# Patient Record
Sex: FEMALE | Race: WHITE | NOT HISPANIC OR LATINO | Employment: OTHER | RURAL
[De-identification: names, ages, dates, MRNs, and addresses within clinical notes are randomized per-mention and may not be internally consistent; named-entity substitution may affect disease eponyms.]

---

## 2022-04-01 ENCOUNTER — TRANSCRIBE ORDERS (OUTPATIENT)
Dept: PHYSICAL THERAPY | Facility: CLINIC | Age: 57
End: 2022-04-01

## 2022-04-01 DIAGNOSIS — R60.0 LEG EDEMA, RIGHT: Primary | ICD-10-CM

## 2022-04-22 ENCOUNTER — TREATMENT (OUTPATIENT)
Dept: PHYSICAL THERAPY | Facility: CLINIC | Age: 57
End: 2022-04-22

## 2022-04-22 DIAGNOSIS — Z89.612 HISTORY OF LEFT ABOVE KNEE AMPUTATION: ICD-10-CM

## 2022-04-22 DIAGNOSIS — L03.115 CELLULITIS OF RIGHT LEG: ICD-10-CM

## 2022-04-22 DIAGNOSIS — R26.89 NEED FOR ASSISTANCE DUE TO UNSTEADY GAIT: ICD-10-CM

## 2022-04-22 DIAGNOSIS — M75.21 BICEPS TENDONITIS ON RIGHT: ICD-10-CM

## 2022-04-22 DIAGNOSIS — R53.1 GENERALIZED WEAKNESS: Primary | ICD-10-CM

## 2022-04-22 PROCEDURE — 97163 PT EVAL HIGH COMPLEX 45 MIN: CPT | Performed by: PHYSICAL THERAPIST

## 2022-04-22 PROCEDURE — 97535 SELF CARE MNGMENT TRAINING: CPT | Performed by: PHYSICAL THERAPIST

## 2022-04-22 NOTE — PROGRESS NOTES
Physical Therapy Initial Evaluation and Plan of Care    Patient: Marilyn Martins   : 1965  Diagnosis/ICD-10 Code:  Generalized weakness [R53.1]  Referring practitioner: Rocco Mercado MD  Date of Initial Visit: 2022  Today's Date: 2022  Patient seen for 1 sessions           Subjective Questionnaire: LEFS: ; lymphedema life impact scale: provided to pt to fill out prior subsequent sessions.       Subjective Evaluation    History of Present Illness  Mechanism of injury: Delphine reports she was in hospital for 3 days, was DC 3 wks ago. She got severely dehydrated and has gotten weak. She used to transfer independent to/from  indep, no AD but now is needling help. Her sister Karla is present at  and assists with subjective. Karla & Delphine live together, Karla assists Delphine at home but Karla has kids to  and take to school. She helps Delphine into bed when she leaves. Karla reports Delphine has lost some confidence since being in hospital. She didn't have home health after DC bc her insurance wouldn't cover it. Her R shoulder has been sore in the front which makes transferring difficult.   Delphine has been sponge bathing for years, since before [facility]. However, she was able to stand at sink to do dishes prior to recent hospitalization. She owns LLE prosthesis but hasn't used it bc it just didn't work for her.      PSH: 2000: partial hysterectomy & bladder lift & hernia repair. L TKA x 2, most recently in ; RTKA ; LLE AKA .     Delphine is able to lean over R side of  to  items from floor, but is unable to do the same to the L, she'd like to.    She has /fu with Dr. Contreras (fam & sports med) in [facility] on May 6    Pain  Location: R bicipital groove  Quality: burning    Social Support  Patient lives at: tub/shower combo, no grab bars, no chair besides .    Patient Goals  Patient goals for therapy: decreased pain, improved balance, decreased  edema, increased strength and independence with ADLs/IADLs             Objective          Postural Observations  Seated posture: poor        Observations     Additional Knee Observation Details  L LE erythema, 4+ pitting edema, large bulge at R medial distal thigh w/tenderness - tissue feels fibrotic    RUE weakness 3-/5 & painful in bicipital groove, w/all overhead reaching & w/resistance in all planes neg RTC tests    LUE full AROM & 4+/5    Tenderness     Right Shoulder  Tenderness in the biceps tendon (proximal) and bicipital groove.     Active Range of Motion   Left Shoulder   Flexion: 120 degrees with pain    Right Shoulder   Flexion: 135 degrees     Tests     Right Shoulder   Positive painful arc and Speed's.   Negative external rotation lag sign and Henok's sign.     Functional Assessment     Comments  SBA for WC to edge of bed transfer, CGA for STS from  to FWW. Pt owns [facility]      SCT: Pt was provided with a hard copy of the HEP and instructed in use of it and all listed exercises.  Pt was instructed to cease any exercise that was painful, or that feels as though the form is incorrect.  Pt will return with any questions or difficulty at next session.  Pt acknowledged these instructions and agreed. View at [email or web address]-[email or web address] using code: [id number] (WC tricep push ups, pressure relief in WC, no money w/yellow band, elbow flexion w/resistance band)    Assessment & Plan     Assessment  Impairments: abnormal gait, abnormal muscle firing, abnormal muscle tone, abnormal or restricted ROM, activity intolerance, impaired balance, impaired physical strength, lacks appropriate home exercise program, pain with function and safety issue  Functional Limitations: lifting, uncomfortable because of pain, moving in bed, sitting, standing, reaching overhead and unable to perform repetitive tasks  Assessment details: Delphine is a 55 yo female presenting to OP PT after recent hospitalization at [facility] (3/29-4/1). Associated dx  include but not limited to RLE cellulitis, weakness, recurrent UTI, DM, community acquired pneumonia. She was IP x 3 days then DC home ~3 wks ago without HH PT. She is exhibiting generalized weakness, R biceps tendonitis, difficutly & instability with transfers, limited standing tolerance for ADLs, and reliance on CG (sister). She also exhibits RLE lymphedema w/skin fibrosis. Swelling has not receded with elevation. Without treatment, pt is at higher risk to develop wounds and infections which could lead to acute care hospitalization.She will be treated by CLT for RLE edema and PT or PTA for functional mobility & R shoulder dysfunction. Pt is LAKA and will need to rely on UEs for transfers.   The patient is an appropriate candidate for physical therapy and will benefit from skilled patient intervention in order address the above impairments/limitations.  The plan of care, goals and treatment plan were discussed with the patient and the patient is agreeable to participating in patient services.  Prognosis: fair    Goals  Plan Goals: STG to be met in 4 wks  1. Min or mod verbal cuing to perform exercises with bandages on  2. Recognize and verbalize signs of infection to prevent hospitalization  3. Pt will demonstrate safe, indep WC<->bed/stable chair to reduce reliance on sister/CG.   4. Pt will report at least 25% reduction in R ant shoulder pain with transfers to improve tolerance and safety with activity.  5. Pt will demonstrate at least 2 min standing tolerance at FWW to promote ADL completion and doing dishes.    LTG to be met in 12 wks  1. I with donning garment with sister's assistance.  2. Decrease swelling so that pt can don/doff appropriate foot wear.  3. Decrease R LE girth by 3-4 cm for improved fit of pants.  4. Pt will report at least 50% reduction in R ant shoulder pain with transfers to improve tolerance and safety with activity.  5. Pt will demonstrate at least 5 min standing tolerance at FWW to  promote ADL completion and doing dishes.  6. Pt will demonstrate stand pivot transfer to bench using FWW or LRAD to promote return to bathing in tub/shower combo safely.     Plan  Therapy options: will be seen for skilled therapy services  Planned modality interventions: cryotherapy, dry needling, TENS, thermotherapy (hydrocollator packs) and ultrasound  Other planned modality interventions: Complete decongestive therapy (CDT) to include: manual lymph drainage (MLD), mechanical compression bandaging (MCB), skin care, pt education, and exercise.  Planned therapy interventions: abdominal trunk stabilization, ADL retraining, balance/weight-bearing training, body mechanics training, compression, dressing changes, fine motor coordination training, flexibility, functional ROM exercises, home exercise program, IADL retraining, joint mobilization, manual therapy, motor coordination training, neuromuscular re-education, orthotic fitting/training, postural training, soft tissue mobilization, strengthening, stretching, therapeutic activities, transfer training and wheelchair management/propulsion training  Frequency: 2x week  Duration in weeks: 12  Treatment plan discussed with: patient, family and caregiver        Timed:         Manual Therapy:         mins  83606;     Therapeutic Exercise:         mins  57107;     Neuromuscular Cara:        mins  91900;    Therapeutic Activity:          mins  89368;     Gait Training:           mins  21560;     Ultrasound:          mins  76987;    Self-care  __10__ mins 21401    Un-Timed:  Electrical Stimulation:         mins  16755 ( );  Dry Needling          mins self-pay 71649  Traction          mins 99531  Low Eval          mins  63468  Mod Eval         mins  92850  High Eval                        60    mins  55978  Canal repositioning ___  mins  22508        Timed Treatment:   10   mins   Total Treatment:     70   mins    PT SIGNATURE: Margot Chairez, PT, DPT, cert. DN  [place]  license # [id number]  Electronically signed by Margot Chairez, PT, 04/22/22, 10:56 AM EDT    Initial Certification  Certification Period: 4/22/2022 through 7/20/2022  I certify that the therapy services are furnished while this patient is under my care.  The services outlined above are required by this patient, and will be reviewed every 90 days.     PHYSICIAN: Rocco Mercado MD      NPI: [id number]                                 DATE: ______________________________________________________________________________________________     Please sign and return via fax to [phone]. Thank you, [facility].

## 2022-04-25 NOTE — PROGRESS NOTES
Lymphedema evaluation:    Pt lives with her sister x20 yr. Hx L AKA 2018. Pt reports having swelling in R LE x5 yr. States swelling initially would just occur in the summer but has progressively worsened and is now all the time. Pt concerned about large amount of swelling in at her inner thigh just above her knee. Pt was in the hospital recently due to cellulitis, UTI, anemia. Pt reports that blood flow was checked while in the hospital and was good. No dx of CHF. Pt having difficulty finding shoes and pants to wear. Pt reports that MD has tried 2-3 layer compression and it did not decrease swelling.    Pt seated in manual w/c. Seated slung and pressure applied at medial mid R thigh.  Skin assessment: hyperpigmentation noted. Fibrotic skin changes present R foot and ankle. Thickened and yellow nails R foot.     Forefoot  R = 24.3cm  Malleolus  R = 26.5cm  Heel +20 cm   R = 39.7cm  Heel +30 cm   R = 52.9cm  Knee jt line  R = 59.8cm  Heel +40 cm   R = 67.5cm    Discussed with pt and her sister that cost of bandaging supplies is not covered by insurance. Pt and sister verbalized understanding. Talked with pt regarding w/c also and that they way her seat has stretched it is putting pressure on posterior medial thigh and causing tourniquet effect at this area. Instructed pt that use of waist /abdominal binder can be used to provided compression to thigh to address edema. Pt states she will get one. Plan to begin compression bandaging at next visit.    Please see eval dated this date, 4/22/2022 for goals and interventions.    Karla Garcia, PT, CLT

## 2022-05-06 ENCOUNTER — TREATMENT (OUTPATIENT)
Dept: PHYSICAL THERAPY | Facility: CLINIC | Age: 57
End: 2022-05-06

## 2022-05-06 DIAGNOSIS — Z89.612 HISTORY OF LEFT ABOVE KNEE AMPUTATION: ICD-10-CM

## 2022-05-06 DIAGNOSIS — R53.1 GENERALIZED WEAKNESS: Primary | ICD-10-CM

## 2022-05-06 DIAGNOSIS — L03.115 CELLULITIS OF RIGHT LEG: ICD-10-CM

## 2022-05-06 DIAGNOSIS — M75.21 BICEPS TENDONITIS ON RIGHT: ICD-10-CM

## 2022-05-06 DIAGNOSIS — R26.89 NEED FOR ASSISTANCE DUE TO UNSTEADY GAIT: ICD-10-CM

## 2022-05-06 PROCEDURE — 97110 THERAPEUTIC EXERCISES: CPT | Performed by: PHYSICAL THERAPIST

## 2022-05-06 PROCEDURE — 97530 THERAPEUTIC ACTIVITIES: CPT | Performed by: PHYSICAL THERAPIST

## 2022-05-06 PROCEDURE — 97014 ELECTRIC STIMULATION THERAPY: CPT | Performed by: PHYSICAL THERAPIST

## 2022-05-06 NOTE — PROGRESS NOTES
Physical Therapy Daily Progress Note      Patient: Marilyn Martins   : 1965  Diagnosis/ICD-10 Code:  Generalized weakness [R53.1]   Problems Addressed this Visit    None     Visit Diagnoses     Generalized weakness    -  Primary    Biceps tendonitis on right        Need for assistance due to unsteady gait        History of left above knee amputation (HCC)        Cellulitis of right leg          Diagnoses       Codes Comments    Generalized weakness    -  Primary ICD-10-CM: R53.1  ICD-9-CM: 780.79     Biceps tendonitis on right     ICD-10-CM: M75.21  ICD-9-CM: 726.12     Need for assistance due to unsteady gait     ICD-10-CM: R26.89  ICD-9-CM: 781.2     History of left above knee amputation (HCC)     ICD-10-CM: Z89.612  ICD-9-CM: V49.76     Cellulitis of right leg     ICD-10-CM: L03.115  ICD-9-CM: 682.6         Referring practitioner: Misha Manriquez MD  Date of Initial Visit: Type: THERAPY  Noted: 2022  Today's Date: 2022    VISIT#: 2    Subjective   Nancy reports she has not been doing her exercises or using her leg wrap. Her sister is present for session today. Sister reports she has tried to encourage Nancy to do her exercises but she hasn't been.     Objective     See Exercise, Manual, and Modality Logs for complete treatment.   Gait belt donned for out of chair activity    Assessment/Plan  Pt was able to transfer with CGA. She cont to rely heavily on UEs in standing & for transfers. R shoulder pain persists. Pt's sister & PT encouraged pt throughout session to do HEP regularly. PT advised pt to use timer & shift position every 30 min to relieve pressure.   Progress per Plan of Care         Timed:         Manual Therapy:         mins  57150;     Therapeutic Exercise:    25     mins  10506;     Neuromuscular Cara:        mins  32090;    Therapeutic Activity:     15     mins  86649;     Gait Training:           mins  68719;     Ultrasound:          mins  68480;    Ionto                                    mins   90693  Self Care                    5        mins   64821  Canalith Repos                   mins  48959    Un-Timed:  Electrical Stimulation:    15     mins  33040 ( );  Dry Needling          mins 79085/42340  Traction          mins 44675  Low Eval          Mins  04260  Mod Eval          Mins  12071  High Eval                            Mins  11621  Re-Eval                               mins  78123    Timed Treatment:   45   mins   Total Treatment:     60   mins    Margot Chairez, PT, DPT, cert. DN  Physical Therapist  [place] Lic # [id number]

## 2022-05-11 ENCOUNTER — TREATMENT (OUTPATIENT)
Dept: PHYSICAL THERAPY | Facility: CLINIC | Age: 57
End: 2022-05-11

## 2022-05-11 DIAGNOSIS — Z89.612 HISTORY OF LEFT ABOVE KNEE AMPUTATION: ICD-10-CM

## 2022-05-11 DIAGNOSIS — M75.21 BICEPS TENDONITIS ON RIGHT: ICD-10-CM

## 2022-05-11 DIAGNOSIS — R26.89 NEED FOR ASSISTANCE DUE TO UNSTEADY GAIT: ICD-10-CM

## 2022-05-11 DIAGNOSIS — L03.115 CELLULITIS OF RIGHT LEG: ICD-10-CM

## 2022-05-11 DIAGNOSIS — R53.1 GENERALIZED WEAKNESS: Primary | ICD-10-CM

## 2022-05-11 PROCEDURE — 97110 THERAPEUTIC EXERCISES: CPT | Performed by: PHYSICAL THERAPIST

## 2022-05-11 PROCEDURE — 97530 THERAPEUTIC ACTIVITIES: CPT | Performed by: PHYSICAL THERAPIST

## 2022-05-11 NOTE — PROGRESS NOTES
Physical Therapy Daily Progress Note      Patient: Marilyn Martins   : 1965  Diagnosis/ICD-10 Code:  Generalized weakness [R53.1]   Problems Addressed this Visit    None     Visit Diagnoses     Generalized weakness    -  Primary    Biceps tendonitis on right        Need for assistance due to unsteady gait        History of left above knee amputation (HCC)        Cellulitis of right leg          Diagnoses       Codes Comments    Generalized weakness    -  Primary ICD-10-CM: R53.1  ICD-9-CM: 780.79     Biceps tendonitis on right     ICD-10-CM: M75.21  ICD-9-CM: 726.12     Need for assistance due to unsteady gait     ICD-10-CM: R26.89  ICD-9-CM: 781.2     History of left above knee amputation (HCC)     ICD-10-CM: Z89.612  ICD-9-CM: V49.76     Cellulitis of right leg     ICD-10-CM: L03.115  ICD-9-CM: 682.6         Referring practitioner: Misha Manriquez MD  Date of Initial Visit: Type: THERAPY  Noted: 2022  Today's Date: 2022    VISIT#: 3    Subjective   Delphine reports she was sore after last tx session. She also notes she is already on her supplemental O2 this morning, usually uses at night but sometimes during the day prn. She is unsure why she is SOA this AM, but reports wasp sting on R post forearm. Delphine's sister Karla reports the CG team want PT to keep an eye on the R lower leg skin integrity to make sure it doesn't become a medical issue. Delphine reports Dr. Contreras will no longer treat RLE cellulitis, will leave it to CLT. Karla reports Delphine's O2 was down to 92% this AM after being on O2 all night, she's not supposed to let it get below 96%    Objective     See Exercise, Manual, and Modality Logs for complete treatment.     PT visualized erythema & edema in R post forearm, pt reports pain in area. She applied zinc oxide.   SpO2 99% throughout session on 2.5L/min supplemental O2 via NC after UE exercises    Assessment/Plan  Delphine sees Karla Garcia, PT, CLT on Friday to address  RLE lymphedema and associated issues. Delphine required fewer verbal cues for form during CAITLYN today. WC<>bed transfer SBA  Progress strengthening /stabilization /functional activity  RLE lymphedema & cellulitis addressed by CLT        Timed:         Manual Therapy:         mins  77948;     Therapeutic Exercise:    25     mins  13369;     Neuromuscular Cara:        mins  26056;    Therapeutic Activity:     15     mins  70296;     Gait Training:           mins  23232;     Ultrasound:          mins  75220;    Ionto                                   mins   68135  Self Care                            mins   82762  Canalith Repos                   mins  10701    Un-Timed:  Electrical Stimulation:         mins  00095 ( );  Dry Needling          mins 74645/04036  Traction          mins 05891  Low Eval          Mins  65090  Mod Eval          Mins  55752  High Eval                            Mins  26811  Re-Eval                               mins  46818    Timed Treatment:   40   mins   Total Treatment:     40   mins    Margot Chairez, PT, DPT, cert. DN  Physical Therapist  [place] Lic # [id number]

## 2022-05-27 ENCOUNTER — TREATMENT (OUTPATIENT)
Dept: PHYSICAL THERAPY | Facility: CLINIC | Age: 57
End: 2022-05-27

## 2022-05-27 DIAGNOSIS — R53.1 GENERALIZED WEAKNESS: ICD-10-CM

## 2022-05-27 DIAGNOSIS — L03.115 CELLULITIS OF RIGHT LEG: ICD-10-CM

## 2022-05-27 DIAGNOSIS — I89.0 LYMPHEDEMA OF RIGHT LOWER EXTREMITY: Primary | ICD-10-CM

## 2022-05-27 PROCEDURE — 97110 THERAPEUTIC EXERCISES: CPT | Performed by: PHYSICAL THERAPIST

## 2022-05-27 PROCEDURE — 97140 MANUAL THERAPY 1/> REGIONS: CPT | Performed by: PHYSICAL THERAPIST

## 2022-05-27 NOTE — PROGRESS NOTES
Physical Therapy Daily Progress Note      Patient: Marilyn Martins   : 1965  Diagnosis/ICD-10 Code:  Lymphedema of right lower extremity [I89.0]   Problems Addressed this Visit    None     Visit Diagnoses     Lymphedema of right lower extremity    -  Primary    Generalized weakness        Cellulitis of right leg          Diagnoses       Codes Comments    Lymphedema of right lower extremity    -  Primary ICD-10-CM: I89.0  ICD-9-CM: 457.1     Generalized weakness     ICD-10-CM: R53.1  ICD-9-CM: 780.79     Cellulitis of right leg     ICD-10-CM: L03.115  ICD-9-CM: 682.6         Referring practitioner: No ref. provider found  Date of Initial Visit: Type: THERAPY  Noted: 2022  Today's Date: 2022    VISIT#: 4    Subjective : Pt states her leg is still swelling. Pt's sister reports that pt has not been doing her exercises as much as she should. Pt reports she has been trying to elevate her R leg more. Pt in agreement with compression bandaging this date and pt and sister both verbalized understanding of out-of-pocket cost of bandaging supplies.    Objective : Skin on foot improved with no fibrotic tissue at base of toes.   Compression bandaging applied to R LE to thigh. Instructed pt and sister that all bandaging should be removed if pt has any pain or discomfort or discoloration of toes. Instructed to keep bandages clean and dry but short stretch bandages can be washed if needed and hang to dry. Also instructed to remove bandaging if pt is having increased difficulty with transfers due to bandages. Both verbalized good understanding of all education done this date.    Forefoot  R = 27.0cm, L = -cm  Malleolus  R = 27.2cm, L = -cm  Heel +20 cm   R = 39.3cm, L = -cm  Heel +30 cm   R = 51.4cm, L = -cm  Knee jt line  R = 58.2cm, L = -cm  Heel +40 cm   R = 67.7cm, L = -cm    See Exercise, Manual, and Modality Logs for complete treatment.     Assessment/Plan : Increased girth of foot and ankle since initial  assessment. Skin condition improving. Good tolerance in compression bandaging this date. Plan to instruct pt's sister in bandaging next visit.    Progress per Plan of Care         Timed:         Manual Therapy:    35     mins  74911;     Therapeutic Exercise:    10     mins  24041;     Neuromuscular Cara:        mins  40096;    Therapeutic Activity:          mins  63564;     Gait Training:           mins  66485;     Ultrasound:          mins  94606;    Ionto                                   mins   20431  Self Care                            mins   56317    Un-Timed:  Electrical Stimulation:         mins  13362 ( );  Dry Needling          mins 75012/21215  Traction          mins 15215  Canalith Repos                   mins  14306  Low Eval          Mins  54414  Mod Eval          Mins  29203  High Eval                            Mins  55174  Re-Eval                               mins  26769    Timed Treatment:   45   mins   Total Treatment:     45   mins    Karla Garcia, PT, CLT, Cert DN  Physical Therapist  [place] Lic # [id number]

## 2022-06-22 ENCOUNTER — TREATMENT (OUTPATIENT)
Dept: PHYSICAL THERAPY | Facility: CLINIC | Age: 57
End: 2022-06-22

## 2022-06-22 DIAGNOSIS — M75.21 BICEPS TENDONITIS ON RIGHT: ICD-10-CM

## 2022-06-22 DIAGNOSIS — Z89.612 HISTORY OF LEFT ABOVE KNEE AMPUTATION: ICD-10-CM

## 2022-06-22 DIAGNOSIS — I89.0 LYMPHEDEMA OF RIGHT LOWER EXTREMITY: Primary | ICD-10-CM

## 2022-06-22 DIAGNOSIS — R26.89 NEED FOR ASSISTANCE DUE TO UNSTEADY GAIT: ICD-10-CM

## 2022-06-22 DIAGNOSIS — R53.1 GENERALIZED WEAKNESS: ICD-10-CM

## 2022-06-22 PROCEDURE — 97530 THERAPEUTIC ACTIVITIES: CPT | Performed by: PHYSICAL THERAPIST

## 2022-06-22 PROCEDURE — 97110 THERAPEUTIC EXERCISES: CPT | Performed by: PHYSICAL THERAPIST

## 2022-06-22 NOTE — PROGRESS NOTES
Physical Therapy Daily Progress Note      Patient: Marilyn Martins   : 1965  Diagnosis/ICD-10 Code:  Lymphedema of right lower extremity [I89.0]   Problems Addressed this Visit    None     Visit Diagnoses     Lymphedema of right lower extremity    -  Primary    Generalized weakness        Biceps tendonitis on right        Need for assistance due to unsteady gait        History of left above knee amputation (HCC)          Diagnoses       Codes Comments    Lymphedema of right lower extremity    -  Primary ICD-10-CM: I89.0  ICD-9-CM: 457.1     Generalized weakness     ICD-10-CM: R53.1  ICD-9-CM: 780.79     Biceps tendonitis on right     ICD-10-CM: M75.21  ICD-9-CM: 726.12     Need for assistance due to unsteady gait     ICD-10-CM: R26.89  ICD-9-CM: 781.2     History of left above knee amputation (HCC)     ICD-10-CM: Z89.612  ICD-9-CM: V49.76         Referring practitioner: Rocco Mercado, *  Date of Initial Visit: Type: THERAPY  Noted: 2022  Today's Date: 2022    VISIT#: 5    Subjective   Delphine arrives to PT with her sister Karla. Delphine reports compliance with HEP but Kalra reports pt has not been doing hep. Delphine had a stent place in OP procedure 2 wks ago. She was also dx with kidney stone on L side. Both shoulders are bothersome now. They report Delphine got a new chair (wing back) 3 days ago and has been using that to get out of her WC during the day.     Objective     See Exercise, Manual, and Modality Logs for complete treatment.     Assessment/Plan  Delphine was strongly encouraged to comply with HEP for optimized outcomes. STS transfers were CGA. She cont to exhibit UE & trunk weakness resulting in aberrant shoulder mechanics and pain.   Progress per Plan of Care         Timed:         Manual Therapy:         mins  11511;     Therapeutic Exercise:    32     mins  79684;     Neuromuscular Cara:        mins  13412;    Therapeutic Activity:     8     mins  91619;     Gait Training:            mins  23397;     Ultrasound:          mins  58966;    Ionto                                   mins   22321  Self Care                            mins   94970  Canalith Repos                   mins  48150    Un-Timed:  Electrical Stimulation:         mins  45292 ( );  Dry Needling          mins 64184/12731  Traction          mins 96981  Low Eval          Mins  23000  Mod Eval          Mins  80866  High Eval                            Mins  93096  Re-Eval                               mins  23071    Timed Treatment:   38   mins   Total Treatment:     38   mins    Margot Chairez, PT, DPT, cert. DN  Physical Therapist  [place] Lic # [id number]

## 2022-06-29 ENCOUNTER — TREATMENT (OUTPATIENT)
Dept: PHYSICAL THERAPY | Facility: CLINIC | Age: 57
End: 2022-06-29

## 2022-06-29 DIAGNOSIS — R26.89 NEED FOR ASSISTANCE DUE TO UNSTEADY GAIT: ICD-10-CM

## 2022-06-29 DIAGNOSIS — M75.21 BICEPS TENDONITIS ON RIGHT: ICD-10-CM

## 2022-06-29 DIAGNOSIS — Z89.612 HISTORY OF LEFT ABOVE KNEE AMPUTATION: ICD-10-CM

## 2022-06-29 DIAGNOSIS — R53.1 GENERALIZED WEAKNESS: Primary | ICD-10-CM

## 2022-06-29 PROCEDURE — 97110 THERAPEUTIC EXERCISES: CPT | Performed by: PHYSICAL THERAPIST

## 2022-06-29 PROCEDURE — 97530 THERAPEUTIC ACTIVITIES: CPT | Performed by: PHYSICAL THERAPIST

## 2022-06-29 NOTE — PROGRESS NOTES
Physical Therapy Daily Progress Note      Patient: Marilyn Martins   : 1965  Diagnosis/ICD-10 Code:  Generalized weakness [R53.1]   Problems Addressed this Visit    None     Visit Diagnoses     Generalized weakness    -  Primary    Biceps tendonitis on right        Need for assistance due to unsteady gait        History of left above knee amputation (HCC)          Diagnoses       Codes Comments    Generalized weakness    -  Primary ICD-10-CM: R53.1  ICD-9-CM: 780.79     Biceps tendonitis on right     ICD-10-CM: M75.21  ICD-9-CM: 726.12     Need for assistance due to unsteady gait     ICD-10-CM: R26.89  ICD-9-CM: 781.2     History of left above knee amputation (HCC)     ICD-10-CM: Z89.612  ICD-9-CM: V49.76          Referring practitioner: Rocco Mercado, *  Date of Initial Visit: Type: THERAPY  Noted: 2022  Today's Date: 2022    VISIT#: 6    Subjective Patient reports feeling ok today, admits to not performing HEP on regular basis. Doesn't feel motivated to perform HEP.       Objective     See Exercise, Manual, and Modality Logs for complete treatment.     Assessment/Plan Patient provided proper return demonstration with WC to bed transfers x 3 with no assistance required. Patient tolerated all performed therapeutic exercise well with no reports of increased symptoms. Patient will continue to benefit from continued R LE extremity for preparation to use prosthetic on L LE.   Goals  Plan Goals: STG to be met in 4 wks  1. Min or mod verbal cuing to perform exercises with bandages on MET  2. Recognize and verbalize signs of infection to prevent hospitalization MET  3. Pt will demonstrate safe, indep WC<->bed/stable chair to reduce reliance on sister/CG.  MET  4. Pt will report at least 25% reduction in R ant shoulder pain with transfers to improve tolerance and safety with activity. Progressing  5. Pt will demonstrate at least 2 min standing tolerance at FWW to promote ADL completion and doing  dishes. Progressing currently at 1 min    LTG to be met in 12 wks  1. I with donning garment with sister's assistance. Progressing  2. Decrease swelling so that pt can don/doff appropriate foot wear. Progressing  3. Decrease R LE girth by 3-4 cm for improved fit of pants. Not MET  4. Pt will report at least 50% reduction in R ant shoulder pain with transfers to improve tolerance and safety with activity. NOT MET  5. Pt will demonstrate at least 5 min standing tolerance at FWW to promote ADL completion and doing dishes. NOT MET  6. Pt will demonstrate stand pivot transfer to bench using FWW or LRAD to promote return to bathing in tub/shower combo safely. progressing    Progress per Plan of Care and Progress strengthening /stabilization /functional activity         Timed:         Manual Therapy:         mins  44304;     Therapeutic Exercise:    30     mins  39593;     Neuromuscular Cara:        mins  66467;    Therapeutic Activity:     10     mins  14147;     Gait Training:           mins  68825;     Ultrasound:          mins  46095;    Ionto                                   mins   26511  Self Care                    _____  mins   56401  Canalith Repos                  mins  87965    Un-Timed:  Electrical Stimulation:         mins  42002 ( );  Dry Needling          mins self-pay   Traction          mins 59666    Timed Treatment:   40   mins   Total Treatment:     40   mins    Eric Perez PTA  [place] License [id number]  Physical Therapist Assistant

## 2022-07-06 ENCOUNTER — HOSPITAL ENCOUNTER (OUTPATIENT)
Dept: GENERAL RADIOLOGY | Facility: HOSPITAL | Age: 57
Discharge: HOME | End: 2022-07-06
Admitting: UROLOGY

## 2022-07-06 ENCOUNTER — TREATMENT (OUTPATIENT)
Dept: PHYSICAL THERAPY | Facility: CLINIC | Age: 57
End: 2022-07-06

## 2022-07-06 DIAGNOSIS — I89.0 LYMPHEDEMA OF RIGHT LOWER EXTREMITY: Primary | ICD-10-CM

## 2022-07-06 DIAGNOSIS — L03.115 CELLULITIS OF RIGHT LEG: ICD-10-CM

## 2022-07-06 DIAGNOSIS — Z89.612 HISTORY OF LEFT ABOVE KNEE AMPUTATION: ICD-10-CM

## 2022-07-06 DIAGNOSIS — R53.1 GENERALIZED WEAKNESS: ICD-10-CM

## 2022-07-06 DIAGNOSIS — N20.0 CALCULUS, RENAL: ICD-10-CM

## 2022-07-06 PROCEDURE — 97140 MANUAL THERAPY 1/> REGIONS: CPT | Performed by: PHYSICAL THERAPIST

## 2022-07-06 PROCEDURE — 74018 RADEX ABDOMEN 1 VIEW: CPT

## 2022-07-06 PROCEDURE — 97110 THERAPEUTIC EXERCISES: CPT | Performed by: PHYSICAL THERAPIST

## 2022-07-06 NOTE — PROGRESS NOTES
Physical Therapy Daily Progress Note      Patient: Marilyn Martins   : 1965  Diagnosis/ICD-10 Code:  Lymphedema of right lower extremity [I89.0]   Problems Addressed this Visit    None     Visit Diagnoses     Lymphedema of right lower extremity    -  Primary    Cellulitis of right leg        History of left above knee amputation (HCC)        Generalized weakness          Diagnoses       Codes Comments    Lymphedema of right lower extremity    -  Primary ICD-10-CM: I89.0  ICD-9-CM: 457.1     Cellulitis of right leg     ICD-10-CM: L03.115  ICD-9-CM: 682.6     History of left above knee amputation (HCC)     ICD-10-CM: Z89.612  ICD-9-CM: V49.76     Generalized weakness     ICD-10-CM: R53.1  ICD-9-CM: 780.79         Referring practitioner: Rocco Mercado, *  Date of Initial Visit: Type: THERAPY  Noted: 2022  Today's Date: 2022    VISIT#: 7    Subjective Pt states she has been sitting a chair and putting R LE up on ottoman more at home instead of sitting in w/c all the time. States she has not been doing band exercises everyday but has been working on standing. States bandaging only stayed on about 1 day after last visit.    Objective : Encouraged pt's sister to call [employer] to inquire about work order that was put in to replace w/c seat. She verbalized understanding.   Small scabbed area noted along R TKR incision. No signs of infection.     Forefoot  R = 26.1cm, L = -cm  Malleolus  R = 25.5cm, L = -cm  Heel +20 cm   R = 35.5cm, L = -cm  Heel +30 cm   R = 49.4cm, L = -cm  Heel +40 cm   R = 55.8cm, L = -cm  Knee jt line  R = 65.5cm, L = -cm    Performed R LE ther ex seated with compression bandaging in place.  See Exercise, Manual, and Modality Logs for complete treatment.     Assessment/Plan : Decreased girth measurements R LE most likely due to pt elevating leg more during the day. PT to instruct pt's sister in bandaging at next visit. Good tolerance to compression bandaging this  date.    Progress per Plan of Care         Timed:         Manual Therapy:    30     mins  59823;     Therapeutic Exercise:    15     mins  66244;     Neuromuscular Cara:        mins  38887;    Therapeutic Activity:          mins  07168;     Gait Training:           mins  11866;     Ultrasound:          mins  47740;    Ionto                                   mins   98056  Self Care                            mins   17012    Un-Timed:  Electrical Stimulation:         mins  04675 ( );  Dry Needling          mins 49983/16660  Traction          mins 20484  Canalith Repos                   mins  02468  Low Eval          Mins  74021  Mod Eval          Mins  49368  High Eval                            Mins  24826  Re-Eval                               mins  10329    Timed Treatment:   45   mins   Total Treatment:     45   mins    Karla Garcia, PT, CLT, Cert DN  Physical Therapist  [place] Lic # [id number]

## 2022-07-08 ENCOUNTER — TREATMENT (OUTPATIENT)
Dept: PHYSICAL THERAPY | Facility: CLINIC | Age: 57
End: 2022-07-08

## 2022-07-08 DIAGNOSIS — Z89.612 HISTORY OF LEFT ABOVE KNEE AMPUTATION: ICD-10-CM

## 2022-07-08 DIAGNOSIS — L03.115 CELLULITIS OF RIGHT LEG: Primary | ICD-10-CM

## 2022-07-08 DIAGNOSIS — R53.1 GENERALIZED WEAKNESS: ICD-10-CM

## 2022-07-08 DIAGNOSIS — M75.21 BICEPS TENDONITIS ON RIGHT: ICD-10-CM

## 2022-07-08 DIAGNOSIS — R26.89 NEED FOR ASSISTANCE DUE TO UNSTEADY GAIT: ICD-10-CM

## 2022-07-08 PROCEDURE — 97530 THERAPEUTIC ACTIVITIES: CPT | Performed by: PHYSICAL THERAPIST

## 2022-07-08 PROCEDURE — 97110 THERAPEUTIC EXERCISES: CPT | Performed by: PHYSICAL THERAPIST

## 2022-07-08 NOTE — PROGRESS NOTES
Physical Therapy Daily Progress Note      Patient: Marilyn Martins   : 1965  Diagnosis/ICD-10 Code:  Cellulitis of right leg [L03.115]   Problems Addressed this Visit    None     Visit Diagnoses     Cellulitis of right leg    -  Primary    History of left above knee amputation (HCC)        Generalized weakness        Biceps tendonitis on right        Need for assistance due to unsteady gait          Diagnoses       Codes Comments    Cellulitis of right leg    -  Primary ICD-10-CM: L03.115  ICD-9-CM: 682.6     History of left above knee amputation (HCC)     ICD-10-CM: Z89.612  ICD-9-CM: V49.76     Generalized weakness     ICD-10-CM: R53.1  ICD-9-CM: 780.79     Biceps tendonitis on right     ICD-10-CM: M75.21  ICD-9-CM: 726.12     Need for assistance due to unsteady gait     ICD-10-CM: R26.89  ICD-9-CM: 781.2          Referring practitioner: Rocco Mercado, *  Date of Initial Visit: Type: THERAPY  Noted: 2022  Today's Date: 2022    VISIT#: 8    Subjective Patient reports feeling ok, has been standing more at home and is pleased with current progress.       Objective     See Exercise, Manual, and Modality Logs for complete treatment.     Assessment/Plan Patient tolerated progressed therapeutic exercise well with only reports of mild fatigue. Patient will continue to benefit from improved R lower extremity strengthening for improved weight bearing tolerance for improved transitional movement.   Goals  Plan Goals: STG to be met in 4 wks  1. Min or mod verbal cuing to perform exercises with bandages on MET  2. Recognize and verbalize signs of infection to prevent hospitalization MET  3. Pt will demonstrate safe, indep WC<->bed/stable chair to reduce reliance on sister/CG.  MET  4. Pt will report at least 25% reduction in R ant shoulder pain with transfers to improve tolerance and safety with activity. Progressing  5. Pt will demonstrate at least 2 min standing tolerance at FWW to promote ADL  completion and doing dishes. Progressing currently at 1 min    LTG to be met in 12 wks  1. I with donning garment with sister's assistance. Progressing  2. Decrease swelling so that pt can don/doff appropriate foot wear. Progressing  3. Decrease R LE girth by 3-4 cm for improved fit of pants. Not MET  4. Pt will report at least 50% reduction in R ant shoulder pain with transfers to improve tolerance and safety with activity. NOT MET  5. Pt will demonstrate at least 5 min standing tolerance at FWW to promote ADL completion and doing dishes. NOT MET  6. Pt will demonstrate stand pivot transfer to bench using FWW or LRAD to promote return to bathing in tub/shower combo safely. progressing    Progress per Plan of Care and Progress strengthening /stabilization /functional activity         Timed:         Manual Therapy:         mins  95620;     Therapeutic Exercise:    30     mins  47575;     Neuromuscular Cara:        mins  02368;    Therapeutic Activity:     10     mins  66824;     Gait Training:           mins  13712;     Ultrasound:          mins  54040;    Ionto                                   mins   20126  Self Care                    _____  mins   33578  Canalith Repos                   mins  12394    Un-Timed:  Electrical Stimulation:         mins  46709 ( );  Dry Needling         mins self-pay   Traction          mins 03336    Timed Treatment:   40   mins   Total Treatment:     40   mins    Eric Perez PTA  [place] License [id number]  Physical Therapist Assistant

## 2022-07-15 ENCOUNTER — TRANSCRIBE ORDERS (OUTPATIENT)
Dept: ADMINISTRATIVE | Facility: HOSPITAL | Age: 57
End: 2022-07-15

## 2022-07-15 DIAGNOSIS — N13.30 HYDRONEPHROSIS, UNSPECIFIED HYDRONEPHROSIS TYPE: ICD-10-CM

## 2022-07-15 DIAGNOSIS — N20.0 CALCULUS, RENAL: Primary | ICD-10-CM

## 2022-07-20 ENCOUNTER — TREATMENT (OUTPATIENT)
Dept: PHYSICAL THERAPY | Facility: CLINIC | Age: 57
End: 2022-07-20

## 2022-07-20 DIAGNOSIS — I89.0 LYMPHEDEMA OF RIGHT LOWER EXTREMITY: ICD-10-CM

## 2022-07-20 DIAGNOSIS — R26.89 NEED FOR ASSISTANCE DUE TO UNSTEADY GAIT: ICD-10-CM

## 2022-07-20 DIAGNOSIS — R53.1 GENERALIZED WEAKNESS: ICD-10-CM

## 2022-07-20 DIAGNOSIS — L03.115 CELLULITIS OF RIGHT LEG: Primary | ICD-10-CM

## 2022-07-20 DIAGNOSIS — M75.21 BICEPS TENDONITIS ON RIGHT: ICD-10-CM

## 2022-07-20 DIAGNOSIS — Z89.612 HISTORY OF LEFT ABOVE KNEE AMPUTATION: Primary | ICD-10-CM

## 2022-07-20 PROCEDURE — 97110 THERAPEUTIC EXERCISES: CPT | Performed by: PHYSICAL THERAPIST

## 2022-07-20 PROCEDURE — 97530 THERAPEUTIC ACTIVITIES: CPT | Performed by: PHYSICAL THERAPIST

## 2022-07-20 PROCEDURE — 97140 MANUAL THERAPY 1/> REGIONS: CPT | Performed by: PHYSICAL THERAPIST

## 2022-07-20 NOTE — PROGRESS NOTES
Physical Therapy Daily Progress Note      Patient: Marilyn Martins   : 1965  Diagnosis/ICD-10 Code:  Cellulitis of right leg [L03.115]   Problems Addressed this Visit    None     Visit Diagnoses     Cellulitis of right leg    -  Primary    Lymphedema of right lower extremity          Diagnoses       Codes Comments    Cellulitis of right leg    -  Primary ICD-10-CM: L03.115  ICD-9-CM: 682.6     Lymphedema of right lower extremity     ICD-10-CM: I89.0  ICD-9-CM: 457.1         Referring practitioner: Rocco Mercado, *  Date of Initial Visit: Type: THERAPY  Noted: 2022  Today's Date: 2022    VISIT#: 10    Subjective : Pt reports compression bandaging stayed on x1 wk from knee down but thigh portion came off after a couple days.    Objective : R LE skin intact. Pt's sister reports that she has still not heard anything from [facility] regarding w/c repair.    Forefoot  R = 25.7cm, L = -cm  Malleolus  R = 24.9cm, L = -cm  Heel +20 cm   R = 34.8cm, L = -cm  Heel +30 cm   R = 50.3cm, L = -cm  Knee jt line  R = 55.2cm, L = -cm  Heel +45 cm   R = 64.8cm, L = -cm    See Exercise, Manual, and Modality Logs for complete treatment.     Assessment/Plan : Mild decreased girth measurements R LE. Plan PT to instruct pt's sister in bandaging at next visit. Good tolerance to compression bandaging this date.    Progress per Plan of Care         Timed:         Manual Therapy:    40     mins  60412;     Therapeutic Exercise:         mins  13401;     Neuromuscular Cara:        mins  35657;    Therapeutic Activity:          mins  96187;     Gait Training:           mins  45576;     Ultrasound:          mins  77809;    Ionto                                   mins   77839  Self Care                            mins   55307    Un-Timed:  Electrical Stimulation:         mins  83539 ( );  Dry Needling          mins /  Traction          mins 21390  Canalith Repos                   mins  74627  Low Eval           Mins  43468  Mod Eval          Mins  18258  High Eval                            Mins  26081  Re-Eval                               mins  18570    Timed Treatment:   40   mins   Total Treatment:     40   mins    Karla Garcia PT, CLT, Cert DN  Physical Therapist  [place] Lic # [id number]

## 2022-07-20 NOTE — PROGRESS NOTES
Physical Therapy Daily Progress Note      Patient: Marilyn Martins   : 1965  Diagnosis/ICD-10 Code:  History of left above knee amputation (HCC) [Z89.612]   Problems Addressed this Visit    None     Visit Diagnoses     History of left above knee amputation (HCC)    -  Primary    Generalized weakness        Biceps tendonitis on right        Need for assistance due to unsteady gait          Diagnoses       Codes Comments    History of left above knee amputation (HCC)    -  Primary ICD-10-CM: Z89.612  ICD-9-CM: V49.76     Generalized weakness     ICD-10-CM: R53.1  ICD-9-CM: 780.79     Biceps tendonitis on right     ICD-10-CM: M75.21  ICD-9-CM: 726.12     Need for assistance due to unsteady gait     ICD-10-CM: R26.89  ICD-9-CM: 781.2         Referring practitioner: Rocco Mercado, *  Date of Initial Visit: Type: THERAPY  Noted: 2022  Today's Date: 2022    VISIT#: 9    Subjective   Delphine admits she has not done her exercises. However, Karla reports Delphine is able to stand longer now, e.g. in bathroom, and she is able to use handy bar and get into/out of vehicle with relative ease.     Objective     See Exercise, Manual, and Modality Logs for complete treatment.     Assessment/Plan  Delphine was again encouraged to work on HEP to reduce shoulder pain and increase ease of transfers and reduce LE edema.    RECERT          Timed:         Manual Therapy:         mins  85332;     Therapeutic Exercise:    30     mins  64715;     Neuromuscular Cara:        mins  13336;    Therapeutic Activity:     15     mins  16530;     Gait Training:           mins  64681;     Ultrasound:          mins  55357;    Ionto                                   mins   48232  Self Care                            mins   25564  Canalith Repos                   mins  34997    Un-Timed:  Electrical Stimulation:         mins  71083 ( );  Dry Needling          mins /  Traction          mins 80718  Low Eval           Mins  84708  Mod Eval          Mins  04005  High Eval                            Mins  81120  Re-Eval                               mins  23555    Timed Treatment:   45   mins   Total Treatment:     45   mins    Margot Chairez PT, DPT, cert. DN  Physical Therapist  [place] Lic # [id number]

## 2022-07-22 ENCOUNTER — TREATMENT (OUTPATIENT)
Dept: PHYSICAL THERAPY | Facility: CLINIC | Age: 57
End: 2022-07-22

## 2022-07-22 DIAGNOSIS — M75.21 BICEPS TENDONITIS ON RIGHT: ICD-10-CM

## 2022-07-22 DIAGNOSIS — L03.115 CELLULITIS OF RIGHT LEG: ICD-10-CM

## 2022-07-22 DIAGNOSIS — R53.1 GENERALIZED WEAKNESS: ICD-10-CM

## 2022-07-22 DIAGNOSIS — R26.89 NEED FOR ASSISTANCE DUE TO UNSTEADY GAIT: ICD-10-CM

## 2022-07-22 DIAGNOSIS — Z74.09: ICD-10-CM

## 2022-07-22 DIAGNOSIS — Z99.3 WHEELCHAIR DEPENDENT: ICD-10-CM

## 2022-07-22 DIAGNOSIS — I89.0 LYMPHEDEMA OF RIGHT LOWER EXTREMITY: Primary | ICD-10-CM

## 2022-07-22 DIAGNOSIS — Z89.612 HISTORY OF LEFT ABOVE KNEE AMPUTATION: Primary | ICD-10-CM

## 2022-07-22 PROCEDURE — 97140 MANUAL THERAPY 1/> REGIONS: CPT | Performed by: PHYSICAL THERAPIST

## 2022-07-22 PROCEDURE — 97530 THERAPEUTIC ACTIVITIES: CPT | Performed by: PHYSICAL THERAPIST

## 2022-07-22 PROCEDURE — 97164 PT RE-EVAL EST PLAN CARE: CPT | Performed by: PHYSICAL THERAPIST

## 2022-07-22 PROCEDURE — 97535 SELF CARE MNGMENT TRAINING: CPT | Performed by: PHYSICAL THERAPIST

## 2022-07-22 NOTE — PROGRESS NOTES
Re-Assessment / Re-Certification        Patient: Marilyn Martins   : 1965  Diagnosis/ICD-10 Code:  Lymphedema of right lower extremity [I89.0]  Referring practitioner: Rocco Mercado, *  Date of Initial Visit: Type: THERAPY  Noted: 2022  Today's Date: 2022  Patient seen for 1 sessions      Subjective:   Marilyn Martins reports: bandaging has felt good from last visit but thigh part has slid down. Having more trouble transferring with bandaging in place.  Subjective Questionnaire: PT Functional Test: LLIS = 50% impairment  Clinical Progress: improved  Home Program Compliance: Yes  Treatment has included: therapeutic exercise, manual therapy and therapeutic activity      Objective : skin intact and clean. Large lobule medial aspect of distal thigh.    Forefoot  R = 23.8cm, L = -cm  Malleolus  R = 23.5cm, L = -cm  Heel +20 cm   R = 33.0cm, L = -cm  Heel +30 cm   R = 48.1cm, L = -cm  Knee jt line  R = 54.5cm, L = -cm  Heel +45 cm   R = 63.4cm, L = -cm    Assessment/Plan : Decreased girth R LE. Good tolerance to compression bandaging but having increased difficulty with transfers with bandaging in place. Treatment to date has been inconsistent due to scheduling limitations. Pt will benefit from continued PT services to further decrease R LE girth and get pt into compression garment for long-term management of lymphedema.    Progress toward previous goals: Partially Met   STG  1. Min or mod verbal cuing to perform exercises with bandages on - Not met  2. Recognize and verbalize signs of infection to prevent hospitalization - MET  LTG  1. I with donning garment with sister's assistance. - Not met  2. Decrease swelling so that pt can don/doff appropriate foot wear. - Not met  3. Decrease R LE girth by 3-4 cm for improved fit of pants. - Not met    New Goals  Short-term goals (STG): will continue to work toward established goals.  Long-term goals (LTG): Will continue to work toward established  goals.      Recommendations: Continue as planned  Timeframe: 3 months  Frequency: 2x/wk  Prognosis to achieve goals: good    PT Signature: Karla Garcia PT, CLT  Physical Therapist  [place] Lic # [id number]  Electronically signed by Karla Garcia PT, 07/22/22, 3:03 PM EDT    Certification Period: 7/22/2022 thru 10/22/2022  I certify that the therapy services are furnished while this patient is under my care. The services outlined above are required by this patient and will be reviewed every 90 days.    PHYSICIAN: Rocco Mercado MD _____________________________________________________________  NPI: [id number]                                      DATE:    ___________________________________________      Timed:         Manual Therapy:    40     mins  99339;     Therapeutic Exercise:         mins  42777;     Neuromuscular Cara:        mins  58170;    Therapeutic Activity:          mins  92590;     Gait Training:           mins  11533;     Ultrasound:          mins  07408;    Ionto                                   mins   19474  Self Care                            mins   07274    Un-Timed:  Electrical Stimulation:         mins  75627 ( );  Dry Needling          mins 82795/95820  Traction          mins 46057  Can Repos          mins 96424  Low Eval          Mins  31775  Mod Eval          Mins  60209  High Eval                            Mins  49149  Re-Eval                               mins  95626      Timed Treatment:   40   mins   Total Treatment:     40   mins

## 2022-07-22 NOTE — PROGRESS NOTES
Re-Assessment / Re-Certification        Patient: Marilyn Martins   : 1965  Diagnosis/ICD-10 Code:  History of left above knee amputation (HCC) [Z89.612]  Referring practitioner: Rocco Mercado, *  Date of Initial Visit: Type: THERAPY  Noted: 2022  Today's Date: 2022  Patient seen for 11 sessions      Subjective:   Marilyn Martins reports: Delphine & her sister, Karla report Delphine has been active this morning, transferring between vehicles several times. They showed PT video of pt transferring to wing back chair at home and would like to discuss strategies. Her R hand has been going numb after prolong WC propulsion.     Subjective Questionnaire: PT Functional Test: LEFS = 10/80 ( at IE)  Clinical Progress: improved  Home Program Compliance: No  Treatment has included: therapeutic exercise, manual therapy, therapeutic activity and electrical stimulation    Subjective   Objective   Assessment/Plan  Progress toward previous goals: Partially Met     Plan Goals: STG to be met in 4 wks  1. Min or mod verbal cuing to perform exercises with bandages on DC, CLT will start new episode  2. Recognize and verbalize signs of infection to prevent hospitalization  DC, CLT will start new episode  3. Pt will demonstrate safe, indep WC<->bed/stable chair to reduce reliance on sister/CG.  - MET  4. Pt will report at least 25% reduction in R ant shoulder pain with transfers to improve tolerance and safety with activity. - MET, still some pain in B shldrs when lying on side at night  5. Pt will demonstrate at least 2 min standing tolerance at FWW to promote ADL completion and doing dishes. - MET    LTG to be met in 12 wks  1. I with donning garment with sister's assistance.  DC, CLT will start new episode  2. Decrease swelling so that pt can don/doff appropriate foot wear.  DC, CLT will start new episode  3. Decrease R LE girth by 3-4 cm for improved fit of pants.  DC, CLT will start new episode  4. Pt  will report at least 50% reduction in R ant shoulder pain with transfers to improve tolerance and safety with activity. - MET  5. Pt will demonstrate at least 5 min standing tolerance at FWW to promote ADL completion and doing dishes. - NOT MET  6. Pt will demonstrate stand pivot transfer to bench using FWW or LRAD to promote return to bathing in tub/shower combo safely. - WC won't fit into the bathroom    New Goals  Short-term goals (STG):  6. Pt will demonstrate ability to transfer from WC <> low surface with SBA for getting in/out of [employer] vs van.   Long-term goals (LTG): 7. Pt will demonstrate ability to stand at least 2 min w/unilat UE support to facilitate using C/L hand for brushing teeth or wiping countertops.   8. Pt will bring prosthetic limb for possible trial (3-4 steps to bathtub)  9. Pt will hop w/standard or FWW ~3-4 hops to get into bathroom for shower bench transfer       Delphine has made significant progress since IE, considering she admits to not doing her HEP. She cont to have difficulty with transfers to surfaces of different height than her WC seat. She is not using FWW at home for transfers but will benefit from walker training to get into bathroom for bathing.      Recommendations: Continue as planned  Timeframe: 3 months  Frequency: 2x/wk  Prognosis to achieve goals: good    PT Signature: Margot Chairez PT, DPT, cert DN  Physical Therapist  [place] Lic # [id number]  Electronically signed by Margot Chairez PT, 07/22/22, 10:10 AM EDT    Certification Period: 7/29/22 thru 10/29/22  I certify that the therapy services are furnished while this patient is under my care. The services outlined above are required by this patient and will be reviewed every 90 days.    PHYSICIAN: Rocco Mercado MD _____________________________________________________________  NPI: [id number]                                      DATE:    ___________________________________________      Timed:          Manual Therapy:         mins  27265;     Therapeutic Exercise:         mins  61375;     Neuromuscular Cara:       mins  87240;    Therapeutic Activity:     10     mins  16311;     Gait Training:           mins  21513;     Ultrasound:          mins  06695;    Ionto                                   mins   08139  Self Care                       15     mins   04507    Un-Timed:  Electrical Stimulation:         mins  07636 ( );  Dry Needling          mins 75760/45206  Traction          mins 19655  Can Repos          mins 91233  Low Eval          Mins  07674  Mod Eval          Mins  58793  High Eval                            Mins  47460  Re-Eval                           30    mins  79365      Timed Treatment:   25   mins   Total Treatment:     55   mins

## 2022-08-03 ENCOUNTER — TREATMENT (OUTPATIENT)
Dept: PHYSICAL THERAPY | Facility: CLINIC | Age: 57
End: 2022-08-03

## 2022-08-03 DIAGNOSIS — Z89.612 HISTORY OF LEFT ABOVE KNEE AMPUTATION: ICD-10-CM

## 2022-08-03 DIAGNOSIS — R53.1 GENERALIZED WEAKNESS: ICD-10-CM

## 2022-08-03 DIAGNOSIS — M75.21 BICEPS TENDONITIS ON RIGHT: ICD-10-CM

## 2022-08-03 DIAGNOSIS — Z99.3 WHEELCHAIR DEPENDENT: ICD-10-CM

## 2022-08-03 DIAGNOSIS — R26.89 NEED FOR ASSISTANCE DUE TO UNSTEADY GAIT: ICD-10-CM

## 2022-08-03 DIAGNOSIS — L03.115 CELLULITIS OF RIGHT LEG: Primary | ICD-10-CM

## 2022-08-03 DIAGNOSIS — Z74.09: ICD-10-CM

## 2022-08-03 PROCEDURE — 97530 THERAPEUTIC ACTIVITIES: CPT | Performed by: PHYSICAL THERAPIST

## 2022-08-03 PROCEDURE — 97110 THERAPEUTIC EXERCISES: CPT | Performed by: PHYSICAL THERAPIST

## 2022-08-03 NOTE — PROGRESS NOTES
Physical Therapy Daily Progress Note      Patient: Marilyn Martins   : 1965  Diagnosis/ICD-10 Code:  Cellulitis of right leg [L03.115]   Problems Addressed this Visit    None     Visit Diagnoses     Cellulitis of right leg    -  Primary    History of left above knee amputation (HCC)        Generalized weakness        Biceps tendonitis on right        Need for assistance due to unsteady gait        Wheelchair dependent        Difficulty transferring from car to wheelchair          Diagnoses       Codes Comments    Cellulitis of right leg    -  Primary ICD-10-CM: L03.115  ICD-9-CM: 682.6     History of left above knee amputation (HCC)     ICD-10-CM: Z89.612  ICD-9-CM: V49.76     Generalized weakness     ICD-10-CM: R53.1  ICD-9-CM: 780.79     Biceps tendonitis on right     ICD-10-CM: M75.21  ICD-9-CM: 726.12     Need for assistance due to unsteady gait     ICD-10-CM: R26.89  ICD-9-CM: 781.2     Wheelchair dependent     ICD-10-CM: Z99.3  ICD-9-CM: V46.3     Difficulty transferring from car to wheelchair     ICD-10-CM: Z74.09  ICD-9-CM: V49.89          Referring practitioner: Rocco Mercado, *  Date of Initial Visit: Type: THERAPY  Noted: 2022  Today's Date: 8/3/2022    VISIT#: 12    Subjective Patient reports having some increased R low back and R knee pain the past couple days. Admits to not performing HEP on regular basis.       Objective added Nustep, seated thoracic rotation. Static standing 3 min    See Exercise, Manual, and Modality Logs for complete treatment.     Assessment/Plan Patient tolerated progressed therapeutic exercise well with reports of decreased aching symptoms in R back/knee. Patient demonstrating improved standing tolerance with UE walker.   Goals  Plan Goals: STG to be met in 4 wks  1. Min or mod verbal cuing to perform exercises with bandages on MET  2. Recognize and verbalize signs of infection to prevent hospitalization MET  3. Pt will demonstrate safe, indep  WC<->bed/stable chair to reduce reliance on sister/CG.  MET  4. Pt will report at least 25% reduction in R ant shoulder pain with transfers to improve tolerance and safety with activity. Progressing  5. Pt will demonstrate at least 2 min standing tolerance at FWW to promote ADL completion and doing dishes. Progressing currently at 1 min    LTG to be met in 12 wks  1. I with donning garment with sister's assistance. Progressing  2. Decrease swelling so that pt can don/doff appropriate foot wear. Progressing  3. Decrease R LE girth by 3-4 cm for improved fit of pants. Not MET  4. Pt will report at least 50% reduction in R ant shoulder pain with transfers to improve tolerance and safety with activity. NOT MET  5. Pt will demonstrate at least 5 min standing tolerance at FWW to promote ADL completion and doing dishes. NOT MET  6. Pt will demonstrate stand pivot transfer to bench using FWW or LRAD to promote return to bathing in tub/shower combo safely. progressing    Progress per Plan of Care and Progress strengthening /stabilization /functional activity         Timed:         Manual Therapy:         mins  59156;     Therapeutic Exercise:    35     mins  90471;     Neuromuscular Cara:        mins  53831;    Therapeutic Activity:     10     mins  89761;     Gait Training:           mins  19929;     Ultrasound:          mins  91464;    Ionto                                   mins   75790  Self Care                    _____  mins   83399  Canalith Repos                   mins  22577    Un-Timed:  Electrical Stimulation:         mins  93192 ( );  Dry Needling          mins self-pay   Traction          mins 01796    Timed Treatment:   45   mins   Total Treatment:     45   mins    Eric Perez PTA  [place] License [id number]  Physical Therapist Assistant

## 2022-08-11 ENCOUNTER — TREATMENT (OUTPATIENT)
Dept: PHYSICAL THERAPY | Facility: CLINIC | Age: 57
End: 2022-08-11

## 2022-08-11 DIAGNOSIS — I89.0 LYMPHEDEMA OF RIGHT LOWER EXTREMITY: ICD-10-CM

## 2022-08-11 DIAGNOSIS — L03.115 CELLULITIS OF RIGHT LEG: Primary | ICD-10-CM

## 2022-08-11 DIAGNOSIS — Z89.612 HISTORY OF LEFT ABOVE KNEE AMPUTATION: ICD-10-CM

## 2022-08-11 PROCEDURE — 97140 MANUAL THERAPY 1/> REGIONS: CPT | Performed by: PHYSICAL THERAPIST

## 2022-08-11 PROCEDURE — 97110 THERAPEUTIC EXERCISES: CPT | Performed by: PHYSICAL THERAPIST

## 2022-08-11 NOTE — PROGRESS NOTES
Physical Therapy Daily Progress Note      Patient: Marilyn Martins   : 1965  Diagnosis/ICD-10 Code:  Cellulitis of right leg [L03.115]   Problems Addressed this Visit    None     Visit Diagnoses     Cellulitis of right leg    -  Primary    History of left above knee amputation (HCC)        Lymphedema of right lower extremity          Diagnoses       Codes Comments    Cellulitis of right leg    -  Primary ICD-10-CM: L03.115  ICD-9-CM: 682.6     History of left above knee amputation (HCC)     ICD-10-CM: Z89.612  ICD-9-CM: V49.76     Lymphedema of right lower extremity     ICD-10-CM: I89.0  ICD-9-CM: 457.1         Referring practitioner: Rocco Mercado, *  Date of Initial Visit: Type: THERAPY  Noted: 2022  Today's Date: 2022    VISIT#: 2    Subjective : Pt reports her R leg is feeling better and hopes the swelling reduces more.  Pt states she still has a UTI and her blood sugars have been running high. Pt's sister reports that pt is transferring herself much better.    Objective : performed MLD followed by compression bandaging.    Forefoot  R = 25.2cm, L = -cm  Malleolus  R = 25.0cm, L = -cm  Heel +20 cm   R = 35.1cm, L = -cm  Heel +30 cm   R = 50.0cm, L = -cm  Knee jt line  R = 54.6cm, L = -cm  Heel +45 cm   R = 63.9cm, L = -cm    See Exercise, Manual, and Modality Logs for complete treatment.     Assessment/Plan : Slight increase in R foot and lower leg girth. Good tolerance to MLD and compression bandaging. Bandaging done with pt in supine this date and able to wrap higher on R thigh. Educated pt and her sister that insurance has approved PT 1x/wk x6 wk. Pt will benefit from continued PT services to further decrease R LE girth and get pt into compression garment for long-term management of lymphedema.     STG  1. Min or mod verbal cuing to perform exercises with bandages on - Not met  2. Recognize and verbalize signs of infection to prevent hospitalization - MET  LTG  1. I with desean  garment with sister's assistance. - Not met  2. Decrease swelling so that pt can don/doff appropriate foot wear. - Not met  3. Decrease R LE girth by 3-4 cm for improved fit of pants. - Not met    Progress per Plan of Care         Timed:         Manual Therapy:    35     mins  17542;     Therapeutic Exercise:    10     mins  74949;     Neuromuscular Cara:        mins  27790;    Therapeutic Activity:          mins  36705;     Gait Training:           mins  52380;     Ultrasound:          mins  99597;    Ionto                                   mins   95864  Self Care                            mins   72393    Un-Timed:  Electrical Stimulation:         mins  43485 ( );  Dry Needling          mins 01531/40872  Traction          mins 52146  Canalith Repos                   mins  21493  Low Eval          Mins  89353  Mod Eval          Mins  84920  High Eval                            Mins  87290  Re-Eval                               mins  10264    Timed Treatment:   45   mins   Total Treatment:     45   mins    Karla Garcia, PT, CLT, Cert DN  Physical Therapist  [place] Lic # [id number]

## 2022-08-12 ENCOUNTER — HOSPITAL ENCOUNTER (OUTPATIENT)
Dept: CT IMAGING | Facility: HOSPITAL | Age: 57
Discharge: HOME | End: 2022-08-12
Admitting: UROLOGY

## 2022-08-12 DIAGNOSIS — N20.0 CALCULUS, RENAL: ICD-10-CM

## 2022-08-12 DIAGNOSIS — N13.30 HYDRONEPHROSIS, UNSPECIFIED HYDRONEPHROSIS TYPE: ICD-10-CM

## 2022-08-12 PROCEDURE — 74176 CT ABD & PELVIS W/O CONTRAST: CPT

## 2022-08-18 ENCOUNTER — TREATMENT (OUTPATIENT)
Dept: PHYSICAL THERAPY | Facility: CLINIC | Age: 57
End: 2022-08-18

## 2022-08-18 DIAGNOSIS — I89.0 LYMPHEDEMA OF RIGHT LOWER EXTREMITY: ICD-10-CM

## 2022-08-18 DIAGNOSIS — Z89.612 HISTORY OF LEFT ABOVE KNEE AMPUTATION: ICD-10-CM

## 2022-08-18 DIAGNOSIS — R53.1 GENERALIZED WEAKNESS: ICD-10-CM

## 2022-08-18 DIAGNOSIS — L03.115 CELLULITIS OF RIGHT LEG: Primary | ICD-10-CM

## 2022-08-18 PROCEDURE — 97110 THERAPEUTIC EXERCISES: CPT | Performed by: PHYSICAL THERAPIST

## 2022-08-18 PROCEDURE — 97140 MANUAL THERAPY 1/> REGIONS: CPT | Performed by: PHYSICAL THERAPIST

## 2022-08-18 NOTE — PROGRESS NOTES
Physical Therapy Daily Progress Note      Patient: Marilyn Martins   : 1965  Diagnosis/ICD-10 Code:  Cellulitis of right leg [L03.115]   Problems Addressed this Visit    None     Visit Diagnoses     Cellulitis of right leg    -  Primary    History of left above knee amputation (HCC)        Lymphedema of right lower extremity        Generalized weakness          Diagnoses       Codes Comments    Cellulitis of right leg    -  Primary ICD-10-CM: L03.115  ICD-9-CM: 682.6     History of left above knee amputation (HCC)     ICD-10-CM: Z89.612  ICD-9-CM: V49.76     Lymphedema of right lower extremity     ICD-10-CM: I89.0  ICD-9-CM: 457.1     Generalized weakness     ICD-10-CM: R53.1  ICD-9-CM: 780.79         Referring practitioner: Rocco Mercado, *  Date of Initial Visit: Type: THERAPY  Noted: 2022  Today's Date: 2022    VISIT#: 3    Subjective : pt reports bandaging did not stay up on thigh again. Wants to just bandaging R LE to knee today. States her R leg is feeling better overall but having pain behind knee at night.    Objective : Skin intact, no open areas.   Had EdemaWear XL in clinic and applied to thigh. Pt reported that it felt good. Instructed pt to use this for thigh compression until next lymphedema visit. Pt and sister verbalized understanding.    Forefoot  R = 25.2cm, L = -cm  Malleolus  R = 24.3cm, L = -cm  Heel +20 cm   R = 35.4cm, L = -cm  Heel +30 cm   R = 50.5cm, L = -cm  Knee jt line  R = 64.2cm, L = -cm    See Exercise, Manual, and Modality Logs for complete treatment.     Assessment/Plan : No significant R LE girth changes. Good tolerance to MLD and good standing ability during donning of EdemaWear to R thigh. Plan to reassess fit of thigh EdemaWear as next size down (L) may be more appropriate.    Progress per Plan of Care         Timed:         Manual Therapy:    35     mins  06248;     Therapeutic Exercise:    10     mins  64543;     Neuromuscular Cara:        mins   86476;    Therapeutic Activity:          mins  28027;     Gait Training:           mins  09000;     Ultrasound:          mins  83653;    Ionto                                   mins   87683  Self Care                            mins   95519    Un-Timed:  Electrical Stimulation:         mins  66886 ( );  Dry Needling          mins 36669/82929  Traction          mins 34761  Canalith Repos                   mins  38926  Low Eval          Mins  95765  Mod Eval          Mins  54707  High Eval                            Mins  21155  Re-Eval                               mins  26941    Timed Treatment:   45   mins   Total Treatment:     45   mins    Karla Garcia PT, CLT, Cert DN  Physical Therapist  [place] Lic # [id number]

## 2022-08-29 ENCOUNTER — TREATMENT (OUTPATIENT)
Dept: PHYSICAL THERAPY | Facility: CLINIC | Age: 57
End: 2022-08-29

## 2022-08-29 DIAGNOSIS — Z89.612 HISTORY OF LEFT ABOVE KNEE AMPUTATION: ICD-10-CM

## 2022-08-29 DIAGNOSIS — L03.115 CELLULITIS OF RIGHT LEG: Primary | ICD-10-CM

## 2022-08-29 DIAGNOSIS — R53.1 GENERALIZED WEAKNESS: ICD-10-CM

## 2022-08-29 DIAGNOSIS — I89.0 LYMPHEDEMA OF RIGHT LOWER EXTREMITY: ICD-10-CM

## 2022-08-29 PROCEDURE — 97140 MANUAL THERAPY 1/> REGIONS: CPT | Performed by: PHYSICAL THERAPIST

## 2022-08-29 PROCEDURE — 97110 THERAPEUTIC EXERCISES: CPT | Performed by: PHYSICAL THERAPIST

## 2022-08-29 NOTE — PROGRESS NOTES
Physical Therapy Daily Progress Note      Patient: Marilyn Martins   : 1965  Diagnosis/ICD-10 Code:  Cellulitis of right leg [L03.115]   Problems Addressed this Visit    None     Visit Diagnoses     Cellulitis of right leg    -  Primary    History of left above knee amputation (HCC)        Lymphedema of right lower extremity        Generalized weakness          Diagnoses       Codes Comments    Cellulitis of right leg    -  Primary ICD-10-CM: L03.115  ICD-9-CM: 682.6     History of left above knee amputation (HCC)     ICD-10-CM: Z89.612  ICD-9-CM: V49.76     Lymphedema of right lower extremity     ICD-10-CM: I89.0  ICD-9-CM: 457.1     Generalized weakness     ICD-10-CM: R53.1  ICD-9-CM: 780.79         Referring practitioner: Rocco Mercado, *  Date of Initial Visit: Type: THERAPY  Noted: 2022  Today's Date: 2022    VISIT#: 4    Subjective : States EdemaWear slid down some and thinks a smaller size may be better. States she has been working on elevated her leg more.    Objective : Skin intact R LE.     Forefoot  R = 24.0cm  Malleolus  R = 24.3cm  Heel +20 cm   R = 34.5cm  Heel +30 cm   R = 48.4cm  Knee jt line  R = 52.5cm  Heel +40 cm   R = 63.8cm    See Exercise, Manual, and Modality Logs for complete treatment.     Assessment/Plan : Decreased girth R LE. Good tolerance to MLD and compression bandaging. Instructed pt to talk with her sister regarding ordering a Size Large in EdemaWear using card that was provided at last visit with website.     Progress per Plan of Care and Progress strengthening /stabilization /functional activity         Timed:         Manual Therapy:    35     mins  69740;     Therapeutic Exercise:    10     mins  01224;     Neuromuscular Cara:        mins  33373;    Therapeutic Activity:          mins  36646;     Gait Training:           mins  63795;     Ultrasound:          mins  21484;    Ionto                                   mins   61374  Self Care                             mins   48386    Un-Timed:  Electrical Stimulation:         mins  76739 ( );  Dry Needling          mins 27141/13772  Traction          mins 32898  Canalith Repos                   mins  22703  Low Eval          Mins  67634  Mod Eval          Mins  93485  High Eval                            Mins  77099  Re-Eval                               mins  48091    Timed Treatment:   45   mins   Total Treatment:     45   mins    Karla Garcia PT, CLT, Cert DN  Physical Therapist  [place] Lic # [id number]

## 2022-09-02 ENCOUNTER — TRANSCRIBE ORDERS (OUTPATIENT)
Dept: ADMINISTRATIVE | Facility: HOSPITAL | Age: 57
End: 2022-09-02

## 2022-09-02 DIAGNOSIS — N39.0 RECURRENT UTI: Primary | ICD-10-CM

## 2022-09-08 ENCOUNTER — TREATMENT (OUTPATIENT)
Dept: PHYSICAL THERAPY | Facility: CLINIC | Age: 57
End: 2022-09-08

## 2022-09-08 DIAGNOSIS — L03.115 CELLULITIS OF RIGHT LEG: Primary | ICD-10-CM

## 2022-09-08 DIAGNOSIS — R53.1 GENERALIZED WEAKNESS: ICD-10-CM

## 2022-09-08 DIAGNOSIS — Z89.612 HISTORY OF LEFT ABOVE KNEE AMPUTATION: ICD-10-CM

## 2022-09-08 DIAGNOSIS — I89.0 LYMPHEDEMA OF RIGHT LOWER EXTREMITY: ICD-10-CM

## 2022-09-08 PROCEDURE — 97140 MANUAL THERAPY 1/> REGIONS: CPT | Performed by: PHYSICAL THERAPIST

## 2022-09-08 PROCEDURE — 97530 THERAPEUTIC ACTIVITIES: CPT | Performed by: PHYSICAL THERAPIST

## 2022-09-08 PROCEDURE — 97110 THERAPEUTIC EXERCISES: CPT | Performed by: PHYSICAL THERAPIST

## 2022-09-08 NOTE — PROGRESS NOTES
Physical Therapy Daily Progress Note      Patient: Marilyn Martins   : 1965  Diagnosis/ICD-10 Code:  Cellulitis of right leg [L03.115]   Problems Addressed this Visit    None     Visit Diagnoses     Cellulitis of right leg    -  Primary    History of left above knee amputation (HCC)        Lymphedema of right lower extremity        Generalized weakness          Diagnoses       Codes Comments    Cellulitis of right leg    -  Primary ICD-10-CM: L03.115  ICD-9-CM: 682.6     History of left above knee amputation (HCC)     ICD-10-CM: Z89.612  ICD-9-CM: V49.76     Lymphedema of right lower extremity     ICD-10-CM: I89.0  ICD-9-CM: 457.1     Generalized weakness     ICD-10-CM: R53.1  ICD-9-CM: 780.79         Referring practitioner: Rocco Mercado, *  Date of Initial Visit: Type: THERAPY  Noted: 2022  Today's Date: 2022    VISIT#: 5    Subjective : Pt brought in her prosthesis today. States she has had it for over year but has not used it due to being too heavy and hard to use. States her R leg is feeling pretty good. Has not been keeping R leg up as much. Pt brought in prosthesis for PT to see.    Objective :    Forefoot  R = 26.0cm, L = -cm  Malleolus  R = 25.6cm, L = -cm  Heel +20 cm   R = 35.5cm, L = -cm  Heel +30 cm   R = 51.3cm, L = -cm  Knee jt line  R = 53.1cm, L = -cm  Heel +45 cm   R = 63.9cm, L = -cm    PT assisted pt in donning L LE prosthesis. Able to don and pt stood with prosthesis on. Prosthesis noted to be too large. PT instructed pt to contact prosthesis for assessment and fit of prothesis if she wants to start training with it. Pt and pt's sister verbalized understanding.  See Exercise, Manual, and Modality Logs for complete treatment.     Assessment/Plan : Increased girth R LE. Skin intact. Pt to order smaller size of edema wear for thigh. Pt needs to elevate let more frequently. Plan to start discussing and make recommendation for compression garment for long-term  use.    Progress per Plan of Care         Timed:         Manual Therapy:    20     mins  08764;     Therapeutic Exercise:    10     mins  79910;     Neuromuscular Cara:        mins  29985;    Therapeutic Activity:     15     mins  09181;     Gait Training:           mins  77854;     Ultrasound:          mins  77136;    Ionto                                   mins   98935  Self Care                            mins   41444    Un-Timed:  Electrical Stimulation:         mins  84250 ( );  Dry Needling          mins 16445/34147  Traction          mins 70999  Canalith Repos                   mins  26554  Low Eval          Mins  45377  Mod Eval          Mins  85299  High Eval                            Mins  45644  Re-Eval                               mins  91643    Timed Treatment:   45   mins   Total Treatment:     45   mins    Karla Garcia, PT, CLT, Cert DN  Physical Therapist  [place] Lic # [id number]

## 2022-09-12 ENCOUNTER — HOSPITAL ENCOUNTER (OUTPATIENT)
Dept: ULTRASOUND IMAGING | Facility: HOSPITAL | Age: 57
Discharge: HOME | End: 2022-09-12
Admitting: INTERNAL MEDICINE

## 2022-09-12 DIAGNOSIS — N39.0 RECURRENT UTI: ICD-10-CM

## 2022-09-12 PROCEDURE — 76775 US EXAM ABDO BACK WALL LIM: CPT

## 2022-09-15 ENCOUNTER — TREATMENT (OUTPATIENT)
Dept: PHYSICAL THERAPY | Facility: CLINIC | Age: 57
End: 2022-09-15

## 2022-09-15 DIAGNOSIS — I89.0 LYMPHEDEMA OF RIGHT LOWER EXTREMITY: Primary | ICD-10-CM

## 2022-09-15 DIAGNOSIS — R53.1 GENERALIZED WEAKNESS: ICD-10-CM

## 2022-09-15 DIAGNOSIS — Z89.612 HISTORY OF LEFT ABOVE KNEE AMPUTATION: ICD-10-CM

## 2022-09-15 DIAGNOSIS — L03.115 CELLULITIS OF RIGHT LEG: ICD-10-CM

## 2022-09-15 PROCEDURE — 97140 MANUAL THERAPY 1/> REGIONS: CPT | Performed by: PHYSICAL THERAPIST

## 2022-09-15 PROCEDURE — 97110 THERAPEUTIC EXERCISES: CPT | Performed by: PHYSICAL THERAPIST

## 2022-09-15 NOTE — PROGRESS NOTES
Physical Therapy Progress Note/Reassessment    Patient: Marilyn Martins   : 1965  Diagnosis/ICD-10 Code:  Lymphedema of right lower extremity [I89.0]  Referring practitioner: Rocco Mercado, *  Date of Initial Evaluation:  Type: THERAPY  Noted: 2022  Patient seen for 6 sessions      Subjective:   Visit Diagnoses:    ICD-10-CM ICD-9-CM   1. Lymphedema of right lower extremity  I89.0 457.1   2. Cellulitis of right leg  L03.115 682.6   3. History of left above knee amputation (HCC)  Z89.612 V49.76   4. Generalized weakness  R53.1 780.79         Marilyn Martins reports she is having an out-pt surgery next Tues to have stent removed and new one placed in R kidney and kidney stone removed from L kidney. States she has been elevating R LE more. Has called [facility] regarding needing prosthesis not fitting well and is waiting for call back after insurance approval.  Subjective Questionnaire: LEFS: , indicating 21% ability and 79% impairment  LLIS = 57% impairment  Clinical Progress: improved  Home Program Compliance: No  Treatment has included: therapeutic exercise, neuromuscular re-education, manual therapy and therapeutic activity      Objective : Pt transfers w/c <> mat with SBA/supervision.   Transfers sit to stand at walker with SBA.   Pt able to take 3-4 hops with walker and CGA     R LE skin intact except for 2 superficial scratches from their puppies at home. No redness or warmth.    Forefoot  R = 25.7cm, L = -cm  Malleolus  R = 24.9cm, L = -cm  Heel +20 cm   R = 34.0cm, L = -cm  Heel +30 cm   R = 48.4cm, L = -cm  Knee jt line  R = 51.8cm, L = -cm  Heel +45 cm   R = 64.0cm, L = -cm    See Exercise, Manual, and Modality Logs for complete treatment.     Assessment/Plan :    STG to be met in 4 wks  1. Min or mod verbal cuing to perform exercises with bandages on. - MET  2. Recognize and verbalize signs of infection to prevent hospitalization. - MET  3. Pt will demonstrate safe, indep  WC<->bed/stable chair to reduce reliance on sister/CG.  - MET  4. Pt will report at least 25% reduction in R ant shoulder pain with transfers to improve tolerance and safety with activity. - MET, still some pain in B shldrs when lying on side at night  5. Pt will demonstrate at least 2 min standing tolerance at FWW to promote ADL completion and doing dishes. - MET  6. Pt will demonstrate ability to transfer from WC <> low surface with SBA for getting in/out of [employer] vs van.  - MET    LTG to be met in 12 wks  1. I with donning garment with sister's assistance.  - Not met, garment not obtained yet  2. Decrease swelling so that pt can don/doff appropriate foot wear.  - Progressing  3. Decrease R LE girth by 3-4 cm for improved fit of pants.  - Progressing, upper lower leg and thigh girth most improved  4. Pt will report at least 50% reduction in R ant shoulder pain with transfers to improve tolerance and safety with activity. - MET  5. Pt will demonstrate at least 5 min standing tolerance at FWW to promote ADL completion and doing dishes. - NOT MET  6. Pt will demonstrate stand pivot transfer to bench using FWW or LRAD to promote return to bathing in tub/shower combo safely. - WC won't fit into the bathroom  7. Pt will demonstrate ability to stand at least 2 min w/unilat UE support to facilitate using C/L hand for brushing teeth or wiping countertops. - Not met  8. Pt will bring prosthetic limb for possible trial (3-4 steps to bathtub) - MET, poor fit of prosthetic due to pt's weight loss since getting it.  9. Pt will hop w/standard or FWW ~3-4 hops to get into bathroom for shower bench transfer - MET      Recommendations: Continue as planned  Timeframe: 2 months at 1x/wk  Prognosis to achieve goals: good      Timed:         Manual Therapy:    35     mins  91919;     Therapeutic Exercise:    10     mins  75858;     Neuromuscular Cara:        mins  12108;    Therapeutic Activity:          mins  71777;     Gait  Training:           mins  77571;     Ultrasound:          mins  83895;    Ionto                                   mins   25514  Self Care                            mins   14434  Canalith Repos         mins 35070      Un-Timed:  Electrical Stimulation:         mins  16899 ( );  Dry Needling          mins self-pay  Traction          mins 93604      Timed Treatment:   45   mins   Total Treatment:     45   mins    PT Signature: Karla Garcia PT, CLT  PT License: [place] Lic # [id number]

## 2022-10-17 ENCOUNTER — TREATMENT (OUTPATIENT)
Dept: PHYSICAL THERAPY | Facility: CLINIC | Age: 57
End: 2022-10-17

## 2022-10-17 DIAGNOSIS — Z89.612 HISTORY OF LEFT ABOVE KNEE AMPUTATION: ICD-10-CM

## 2022-10-17 DIAGNOSIS — R53.1 GENERALIZED WEAKNESS: ICD-10-CM

## 2022-10-17 DIAGNOSIS — L03.115 CELLULITIS OF RIGHT LEG: ICD-10-CM

## 2022-10-17 DIAGNOSIS — I89.0 LYMPHEDEMA OF RIGHT LOWER EXTREMITY: Primary | ICD-10-CM

## 2022-10-17 PROCEDURE — 97140 MANUAL THERAPY 1/> REGIONS: CPT | Performed by: PHYSICAL THERAPIST

## 2022-10-17 PROCEDURE — 97535 SELF CARE MNGMENT TRAINING: CPT | Performed by: PHYSICAL THERAPIST

## 2022-10-17 NOTE — PROGRESS NOTES
Physical Therapy Progress Note/Reassessment    Patient: Marilyn Martins   : 1965  Diagnosis/ICD-10 Code:  Lymphedema of right lower extremity [I89.0]  Referring practitioner: No ref. provider found  Date of Initial Evaluation:  Type: THERAPY  Noted: 2022  Patient seen for 7 sessions      Subjective:   Visit Diagnoses:    ICD-10-CM ICD-9-CM   1. Lymphedema of right lower extremity  I89.0 457.1   2. Cellulitis of right leg  L03.115 682.6   3. History of left above knee amputation (HCC)  Z89.612 V49.76   4. Generalized weakness  R53.1 780.79         Marilyn Martins reports that surgery for R kidney stent removal and placement and removal of L kidney stone was cancelled due to pt not able to get medical clearance in time. States she has been elevating her R leg more at home. Has been out since 9:30 this morning with other appts.    Subjective Questionnaire: LEFS: , indicating 21% ability and 79% impairment  LLIS = 57% impairment  Clinical Progress: improved  Home Program Compliance: No  Treatment has included: therapeutic exercise, neuromuscular re-education, manual therapy and therapeutic activity        Objective : Pt transfers w/c <> mat with SBA/supervision.   Transfers sit to stand at walker with SBA.      R LE skin intact. No redness or warmth. Dryness noted R foot.     Forefoot  R = 25.5cm, L = -cm  Malleolus  R = 25.4cm, L = -cm  Heel +20 cm   R = 35.3cm, L = -cm  Heel +30 cm   R = 49.7cm, L = -cm  Knee jt line  R = 53.8cm, L = -cm  Heel +45 cm   R = 63.5cm, L = -cm     Educated pt and pt's sister on compression stockings options, cost, and wear schedule. Provided pt and sister with recommended lower cost stocking on [employer]. Encouraged to order larger size (2XL) starting out. Sister verbalized understanding and states she will order it.   Talked with pt and sister about D/C after obtaining compression stocking and PT assessing fit and sizing for effective management of edema. Told pt that she  needs to be more diligent about doing UE ther ex and practicing standing if she wants to improve on those areas. Pt verbalized understanding.    See Exercise, Manual, and Modality Logs for complete treatment.      Assessment/Plan : Pt has only attended PT 1x, that being today, since last update. This is due to schedule limitations and transportation issues. Mild increase in R LE girth likely related to being seated in w/c half the day for appts. Pt will benefit from continued PT services to assess fit of compression stocking when pt receives it. Anticipate PT D/C in 2-3 visits.     STG to be met in 4 wks  1. Min or mod verbal cuing to perform exercises with bandages on. - MET  2. Recognize and verbalize signs of infection to prevent hospitalization. - MET  3. Pt will demonstrate safe, indep WC<->bed/stable chair to reduce reliance on sister/CG.  - MET  4. Pt will report at least 25% reduction in R ant shoulder pain with transfers to improve tolerance and safety with activity. - MET, still some pain in B shldrs when lying on side at night  5. Pt will demonstrate at least 2 min standing tolerance at FWW to promote ADL completion and doing dishes. - MET  6. Pt will demonstrate ability to transfer from WC <> low surface with SBA for getting in/out of [employer] vs van.  - MET    LTG to be met in 12 wks  1. I with donning garment with sister's assistance.  - Not met, garment not obtained yet  2. Decrease swelling so that pt can don/doff appropriate foot wear.  - Progressing  3. Decrease R LE girth by 3-4 cm for improved fit of pants.  - Progressing, upper lower leg and thigh girth most improved  4. Pt will report at least 50% reduction in R ant shoulder pain with transfers to improve tolerance and safety with activity. - MET  5. Pt will demonstrate at least 5 min standing tolerance at FWW to promote ADL completion and doing dishes. - NOT MET  6. Pt will demonstrate stand pivot transfer to bench using FWW or LRAD to  promote return to bathing in tub/shower combo safely. - WC won't fit into the bathroom  7. Pt will demonstrate ability to stand at least 2 min w/unilat UE support to facilitate using C/L hand for brushing teeth or wiping countertops. - Not met  8. Pt will bring prosthetic limb for possible trial (3-4 steps to bathtub) - MET, poor fit of prosthetic due to pt's weight loss since getting it.  9. Pt will hop w/standard or FWW ~3-4 hops to get into bathroom for shower bench transfer - MET       Recommendations: Continue as planned  Timeframe: 2 months at 1x/wk  Prognosis to achieve goals: good      Timed:         Manual Therapy:    35     mins  41685;     Therapeutic Exercise:         mins  33366;     Neuromuscular Cara:        mins  97243;    Therapeutic Activity:          mins  22273;     Gait Training:           mins  29090;     Ultrasound:          mins  92580;    Ionto                                   mins   21930  Self Care                       10     mins   77463  Canalith Repos         mins 88780      Un-Timed:  Electrical Stimulation:         mins  31017 ( );  Dry Needling          mins self-pay  Traction          mins 64182      Timed Treatment:   45   mins   Total Treatment:     45   mins    PT Signature: Karla Garcia, PT, CLT  PT License: [place] Lic # [id number]

## 2022-10-18 PROCEDURE — 82365 CALCULUS SPECTROSCOPY: CPT | Performed by: UROLOGY

## 2022-10-19 ENCOUNTER — LAB REQUISITION (OUTPATIENT)
Dept: LAB | Facility: HOSPITAL | Age: 57
End: 2022-10-19

## 2022-10-19 DIAGNOSIS — N20.0 CALCULUS OF KIDNEY: ICD-10-CM

## 2022-10-24 LAB
COLOR STONE: NORMAL
COM MFR STONE: 20 %
COMPN STONE: NORMAL
LABORATORY COMMENT REPORT: NORMAL
Lab: NORMAL
Lab: NORMAL
PHOTO: NORMAL
SIZE STONE: NORMAL MM
SPEC SOURCE SUBJ: NORMAL
STONE ANALYSIS-IMP: NORMAL
URATE MFR STONE: 80 %
WT STONE: 80 MG

## 2022-11-07 ENCOUNTER — TREATMENT (OUTPATIENT)
Dept: PHYSICAL THERAPY | Facility: CLINIC | Age: 57
End: 2022-11-07

## 2022-11-07 DIAGNOSIS — L03.115 CELLULITIS OF RIGHT LEG: ICD-10-CM

## 2022-11-07 DIAGNOSIS — Z89.612 HISTORY OF LEFT ABOVE KNEE AMPUTATION: ICD-10-CM

## 2022-11-07 DIAGNOSIS — I89.0 LYMPHEDEMA OF RIGHT LOWER EXTREMITY: Primary | ICD-10-CM

## 2022-11-07 PROCEDURE — 97530 THERAPEUTIC ACTIVITIES: CPT | Performed by: PHYSICAL THERAPIST

## 2022-11-07 PROCEDURE — 97140 MANUAL THERAPY 1/> REGIONS: CPT | Performed by: PHYSICAL THERAPIST

## 2022-11-07 PROCEDURE — 97110 THERAPEUTIC EXERCISES: CPT | Performed by: PHYSICAL THERAPIST

## 2022-11-07 NOTE — PROGRESS NOTES
Physical Therapy Daily Treatment Note      Patient: Marilyn Martins   : 1965  Diagnosis/ICD-10 Code:  Lymphedema of right lower extremity [I89.0]   Problems Addressed this Visit    None  Visit Diagnoses     Lymphedema of right lower extremity    -  Primary    Cellulitis of right leg        History of left above knee amputation (HCC)          Diagnoses       Codes Comments    Lymphedema of right lower extremity    -  Primary ICD-10-CM: I89.0  ICD-9-CM: 457.1     Cellulitis of right leg     ICD-10-CM: L03.115  ICD-9-CM: 682.6     History of left above knee amputation (HCC)     ICD-10-CM: Z89.612  ICD-9-CM: V49.76         Referring practitioner: Misha Manriquez MD  Date of Initial Visit: Type: THERAPY  Noted: 2022  Today's Date: 2022    VISIT#: 8    Subjective : Pt reports she has worn compression sock and is able to get it on by herself but cannot wear it. States that everyday she wears it, she is physically ill, has back, nausea and vomiting. Tried wearing it every other day but had the same symptoms. Does not plan to wear it right now. States she is having pain across her low back and pain down L upper and mid back.L sided pain started after kidney stent placement. Got a new w/c at [facility] and states it is comfortable. Was having increased back pain before getting new w/c. Pt states she wants today to be her last day of PT. Is having kidney removed on 2022.    Objective : Pt tender to light palpation L thoracic region. Encouraged pt to talk to her MD regarding new back pain.    R LE skin intact with mild dryness. Applied lotion to R leg.  PT donned compression stocking easily and noted to have good fit. Pt reports her sister bought compression socks at [facility].    Forefoot  R = 25.0cm, L = -cm  Malleolus  R = 24.8cm, L = -cm  Heel +20 cm   R = 35.7cm, L = -cm  Heel +30 cm   R = 51.5cm, L = -cm  Knee jt line  R = 54.8cm, L = -cm  Heel +45 cm   R = 65.8cm, L = -cm    Talked with pt at length  about seated posture and light stretching and movement to help with back pain. Pt states she has been trying to do stretches and they help a little. Encouraged pt to continue elevating R LE as much as possible.  See Exercise, Manual, and Modality Logs for complete treatment.     Assessment/Plan : Minimal change in R LE girth measurements from last visit. Pt noted to not be looking like she feels well. Poor tolerance to compression likely related to kidney disease. PT instructed pt and her sister to talk with urologist about wearing compression after surgery. Both verbalized understanding. Plan to D/C this date per pt request and due to plateau in progress. Pt encouraged to call with any questions.    STG to be met in 4 wks  1. Min or mod verbal cuing to perform exercises with bandages on. - MET  2. Recognize and verbalize signs of infection to prevent hospitalization. - MET  3. Pt will demonstrate safe, indep WC<->bed/stable chair to reduce reliance on sister/CG.  - MET  4. Pt will report at least 25% reduction in R ant shoulder pain with transfers to improve tolerance and safety with activity. - MET, still some pain in B shldrs when lying on side at night  5. Pt will demonstrate at least 2 min standing tolerance at FWW to promote ADL completion and doing dishes. - MET  6. Pt will demonstrate ability to transfer from WC <> low surface with SBA for getting in/out of [employer] vs van.  - MET    LTG to be met in 12 wks  1. I with donning garment with sister's assistance.  - MET  2. Decrease swelling so that pt can don/doff appropriate foot wear.  - MET but pt wearing socks  3. Decrease R LE girth by 3-4 cm for improved fit of pants.  - MET  4. Pt will report at least 50% reduction in R ant shoulder pain with transfers to improve tolerance and safety with activity. - MET  5. Pt will demonstrate at least 5 min standing tolerance at FWW to promote ADL completion and doing dishes. - NOT MET  6. Pt will demonstrate stand  pivot transfer to bench using FWW or LRAD to promote return to bathing in tub/shower combo safely. - WC won't fit into the bathroom  7. Pt will demonstrate ability to stand at least 2 min w/unilat UE support to facilitate using C/L hand for brushing teeth or wiping countertops. - Not met  8. Pt will bring prosthetic limb for possible trial (3-4 steps to bathtub) - MET, poor fit of prosthetic due to pt's weight loss since getting it.  9. Pt will hop w/standard or FWW ~3-4 hops to get into bathroom for shower bench transfer - MET    Plan to DC with HEP         Timed:         Manual Therapy:    15     mins  56101;     Therapeutic Exercise:     10    mins  84821;     Neuromuscular Cara:        mins  00282;    Therapeutic Activity:     15     mins  35975;     Gait Training:           mins  80091;     Ultrasound:          mins  96962;    Ionto                                   mins   35334  Self Care                            mins   75950    Un-Timed:  Electrical Stimulation:         mins  50092 ( );  Dry Needling          mins 84348/68148  Traction          mins 15716  Canalith Repos                   mins  73263  Low Eval          Mins  25462  Mod Eval          Mins  50003  High Eval                            Mins  10267  Re-Eval                               mins  45113    Timed Treatment:   40   mins   Total Treatment:     40   mins    Karla Garcia, PT, CLT, Cert DN  Physical Therapist  [place] Lic # [id number]

## 2023-01-05 ENCOUNTER — OFFICE VISIT (OUTPATIENT)
Dept: FAMILY MEDICINE CLINIC | Facility: CLINIC | Age: 58
End: 2023-01-05
Payer: MEDICAID

## 2023-01-05 VITALS
DIASTOLIC BLOOD PRESSURE: 85 MMHG | SYSTOLIC BLOOD PRESSURE: 129 MMHG | HEART RATE: 85 BPM | BODY MASS INDEX: 38.14 KG/M2 | WEIGHT: 202 LBS | HEIGHT: 61 IN | OXYGEN SATURATION: 99 %

## 2023-01-05 DIAGNOSIS — Z12.11 COLON CANCER SCREENING: ICD-10-CM

## 2023-01-05 DIAGNOSIS — E78.5 HYPERLIPIDEMIA, UNSPECIFIED HYPERLIPIDEMIA TYPE: ICD-10-CM

## 2023-01-05 DIAGNOSIS — E03.9 HYPOTHYROIDISM, UNSPECIFIED TYPE: ICD-10-CM

## 2023-01-05 DIAGNOSIS — R06.02 SHORTNESS OF BREATH: ICD-10-CM

## 2023-01-05 DIAGNOSIS — E11.65 TYPE 2 DIABETES MELLITUS WITH HYPERGLYCEMIA, WITH LONG-TERM CURRENT USE OF INSULIN: Primary | ICD-10-CM

## 2023-01-05 DIAGNOSIS — Z79.4 TYPE 2 DIABETES MELLITUS WITH HYPERGLYCEMIA, WITH LONG-TERM CURRENT USE OF INSULIN: Primary | ICD-10-CM

## 2023-01-05 DIAGNOSIS — R30.0 DYSURIA: ICD-10-CM

## 2023-01-05 DIAGNOSIS — B35.1 TOENAIL FUNGUS: ICD-10-CM

## 2023-01-05 DIAGNOSIS — N30.01 ACUTE CYSTITIS WITH HEMATURIA: Primary | ICD-10-CM

## 2023-01-05 LAB
BILIRUB BLD-MCNC: NEGATIVE MG/DL
CLARITY, POC: CLEAR
COLOR UR: YELLOW
EXPIRATION DATE: ABNORMAL
GLUCOSE UR STRIP-MCNC: ABNORMAL MG/DL
KETONES UR QL: NEGATIVE
LEUKOCYTE EST, POC: ABNORMAL
Lab: ABNORMAL
NITRITE UR-MCNC: NEGATIVE MG/ML
PH UR: 7 [PH] (ref 5–8)
PROT UR STRIP-MCNC: ABNORMAL MG/DL
RBC # UR STRIP: ABNORMAL /UL
SP GR UR: 1.01 (ref 1–1.03)
UROBILINOGEN UR QL: NORMAL

## 2023-01-05 PROCEDURE — 81003 URINALYSIS AUTO W/O SCOPE: CPT | Performed by: NURSE PRACTITIONER

## 2023-01-05 PROCEDURE — 99204 OFFICE O/P NEW MOD 45 MIN: CPT | Performed by: NURSE PRACTITIONER

## 2023-01-05 PROCEDURE — 87086 URINE CULTURE/COLONY COUNT: CPT | Performed by: NURSE PRACTITIONER

## 2023-01-05 RX ORDER — LANCETS 28 GAUGE
EACH MISCELLANEOUS
Qty: 100 EACH | Refills: 12 | Status: SHIPPED | OUTPATIENT
Start: 2023-01-05

## 2023-01-05 RX ORDER — UBIDECARENONE 75 MG
50 CAPSULE ORAL DAILY
COMMUNITY

## 2023-01-05 RX ORDER — GABAPENTIN 300 MG/1
300 CAPSULE ORAL 3 TIMES DAILY
COMMUNITY
End: 2023-01-23 | Stop reason: SDUPTHER

## 2023-01-05 RX ORDER — NITROFURANTOIN 25; 75 MG/1; MG/1
100 CAPSULE ORAL 2 TIMES DAILY
Qty: 10 CAPSULE | Refills: 0 | Status: SHIPPED | OUTPATIENT
Start: 2023-01-05

## 2023-01-05 RX ORDER — LEVOTHYROXINE SODIUM 0.15 MG/1
150 TABLET ORAL DAILY
COMMUNITY
Start: 2022-12-12 | End: 2023-01-23 | Stop reason: SDUPTHER

## 2023-01-05 RX ORDER — FUROSEMIDE 20 MG/1
20 TABLET ORAL DAILY
COMMUNITY
Start: 2022-09-27 | End: 2023-01-23 | Stop reason: SDUPTHER

## 2023-01-05 RX ORDER — MIRABEGRON 25 MG/1
TABLET, FILM COATED, EXTENDED RELEASE ORAL
COMMUNITY
Start: 2023-01-04

## 2023-01-05 RX ORDER — BLOOD-GLUCOSE METER
1 KIT MISCELLANEOUS 4 TIMES DAILY
Qty: 1 EACH | Refills: 0 | Status: SHIPPED | OUTPATIENT
Start: 2023-01-05 | End: 2024-01-05

## 2023-01-05 RX ORDER — METFORMIN HYDROCHLORIDE 500 MG/1
1000 TABLET, EXTENDED RELEASE ORAL DAILY
COMMUNITY
Start: 2022-12-31 | End: 2023-01-23 | Stop reason: SDUPTHER

## 2023-01-05 RX ORDER — SITAGLIPTIN AND METFORMIN HYDROCHLORIDE 1000; 100 MG/1; MG/1
1 TABLET, FILM COATED, EXTENDED RELEASE ORAL EVERY EVENING
COMMUNITY
Start: 2022-12-31 | End: 2023-01-23

## 2023-01-05 RX ORDER — CHLORAL HYDRATE 500 MG
CAPSULE ORAL
COMMUNITY

## 2023-01-05 RX ORDER — EMPAGLIFLOZIN 25 MG/1
25 TABLET, FILM COATED ORAL EVERY MORNING
COMMUNITY
Start: 2022-09-29 | End: 2023-01-23 | Stop reason: SDUPTHER

## 2023-01-05 NOTE — PROGRESS NOTES
"Subjective   Marilyn Martins is a 57 y.o. female presents for   Chief Complaint   Patient presents with   • Diabetes   • Establish Care       Health Maintenance Due   Topic Date Due   • Hepatitis B (1 of 3 - 3-dose series) Never done   • MAMMOGRAM  Never done   • URINE MICROALBUMIN  Never done   • COLORECTAL CANCER SCREENING  Never done   • HEPATITIS C SCREENING  Never done   • ANNUAL PHYSICAL  Never done   • DIABETIC FOOT EXAM  Never done   • DIABETIC EYE EXAM  Never done   • ZOSTER VACCINE (2 of 2) 06/13/2022   • COVID-19 Vaccine (3 - Booster for Moderna series) 09/02/2022   • LIPID PANEL  01/05/2023       History of Present Illness   Pt present to establish care.  Pt caregiver states pt diabetes is not well controlled at this time.  Pt was previously using a long acting insulin and insurance has denied coverage and she is not currently using it.  Her blood sugar has been running around 300s.  She has been using short acting insulin with meals and metformin daily.  She states she would like to see an endocrinologist.  She also requests referral to podiatry for tx of nail fungus.  Pt instructed to soak feet in epsom salt baths and use otc fungal tx until appt.    She also reports recent right nephrectomy and was discharged home on O2.  She states she is still intermittently short of breath but primarily uses O2 at hs.  She is unable to ambulate due to LLE amputation for 6 minute walk.  Hx includes copd however, pt does not currently use an inhaler.  PFT ordered.   She also reports painful urination the past few days.  She denies fever.      Vitals:    01/05/23 1102   BP: 129/85   BP Location: Left arm   Patient Position: Sitting   Cuff Size: Large Adult   Pulse: 85   SpO2: 99%   Weight: 91.6 kg (202 lb)   Height: 154.9 cm (61\")     Body mass index is 38.17 kg/m².    Current Outpatient Medications on File Prior to Visit   Medication Sig Dispense Refill   • furosemide (LASIX) 20 MG tablet Take 20 mg by mouth Daily.  "    • Insulin Lispro (humaLOG) 100 UNIT/ML injection Inject 1 Units under the skin into the appropriate area as directed 3 (Three) Times a Day Before Meals. SS for bloodsugars >150 5 u, 151-200 7 u, 201-250 9 u, 251-300 11 u, 301-350 13 u, 351-400 15 u     • [DISCONTINUED] Insulin Glargine (BASAGLAR KWIKPEN SC) Inject 23 Units under the skin into the appropriate area as directed Every Night.     • gabapentin (NEURONTIN) 300 MG capsule Take 300 mg by mouth 3 (Three) Times a Day.     • Janumet -1000 MG tablet Take 1 tablet by mouth Every Evening.     • Jardiance 25 MG tablet tablet Take 25 mg by mouth Every Morning.     • levothyroxine (SYNTHROID, LEVOTHROID) 150 MCG tablet Take 150 mcg by mouth Daily.     • metFORMIN ER (GLUCOPHAGE-XR) 500 MG 24 hr tablet Take 1,000 mg by mouth Daily.     • Myrbetriq 25 MG tablet sustained-release 24 hour 24 hr tablet      • Omega-3 Fatty Acids (fish oil) 1000 MG capsule capsule Take  by mouth.     • vitamin B-12 (cyanocobalamin) 100 MCG tablet Take 50 mcg by mouth Daily.     • vitamin E 100 UNIT capsule Take 100 Units by mouth Daily.       No current facility-administered medications on file prior to visit.       The following portions of the patient's history were reviewed and updated as appropriate: allergies, current medications, past family history, past medical history, past social history, past surgical history, and problem list.    Review of Systems   Constitutional: Negative for chills and fever.   HENT: Negative for sinus pressure and sore throat.    Eyes: Negative for blurred vision.   Respiratory: Positive for shortness of breath (intermittent). Negative for cough.    Cardiovascular: Negative for chest pain.   Gastrointestinal: Negative for abdominal pain.   Genitourinary: Positive for dysuria.   Musculoskeletal: Negative for arthralgias and joint swelling.   Skin: Negative for color change.   Neurological: Negative for dizziness.   Psychiatric/Behavioral: Negative  for behavioral problems.       Objective   Physical Exam  Vitals and nursing note reviewed.   Constitutional:       Appearance: Normal appearance. She is well-developed. She is obese.   HENT:      Head: Normocephalic and atraumatic.      Right Ear: Tympanic membrane, ear canal and external ear normal.      Left Ear: Tympanic membrane, ear canal and external ear normal.      Nose: Nose normal.   Eyes:      Extraocular Movements: Extraocular movements intact.      Conjunctiva/sclera: Conjunctivae normal.      Pupils: Pupils are equal, round, and reactive to light.   Cardiovascular:      Rate and Rhythm: Normal rate and regular rhythm.      Pulses: Normal pulses.      Heart sounds: Normal heart sounds.   Pulmonary:      Effort: Pulmonary effort is normal.      Breath sounds: Normal breath sounds.   Abdominal:      General: Bowel sounds are normal.      Palpations: Abdomen is soft.   Genitourinary:     Vagina: Normal.   Musculoskeletal:         General: Normal range of motion.      Cervical back: Normal range of motion and neck supple.      Comments: LLE amputation   Skin:     General: Skin is warm and dry.   Neurological:      General: No focal deficit present.      Mental Status: She is alert and oriented to person, place, and time.   Psychiatric:         Mood and Affect: Mood normal.         Behavior: Behavior normal.         Judgment: Judgment normal.       PHQ-9 Total Score:      Assessment & Plan   Diagnoses and all orders for this visit:    1. Type 2 diabetes mellitus with hyperglycemia, with long-term current use of insulin ([facility]) (Primary)  -     Ambulatory Referral to Endocrinology  -     CBC Auto Differential; Future  -     Comprehensive Metabolic Panel; Future  -     Hemoglobin A1c; Future  -     Ambulatory Referral to Podiatry  -     Insulin Glargine (BASAGLAR KWIKPEN) 100 UNIT/ML injection pen; Inject 23 Units under the skin into the appropriate area as directed Every Night for 30 days.  Dispense: 12 mL;  Refill: 3    2. Colon cancer screening  -     Ambulatory Referral For Screening Colonoscopy    3. Hypothyroidism, unspecified type  -     Ambulatory Referral to Endocrinology  -     TSH; Future    4. Dysuria  -     POCT urinalysis dipstick, automated  -     Urine Culture - Urine, Urine, Clean Catch    5. Hyperlipidemia, unspecified hyperlipidemia type  -     Lipid Panel; Future    6. Toenail fungus  -     Ambulatory Referral to Podiatry    7. Shortness of breath  -     Full Pulmonary Function Test With Bronchodilator; Future    Other orders  -     glucose monitoring kit (FREESTYLE) monitoring kit; 1 each 4 (Four) Times a Day. E11.65  Dispense: 1 each; Refill: 0  -     glucose blood (FREESTYLE TEST STRIPS) test strip; 4 times daily as directed  Dispense: 200 each; Refill: 12  -     Lancets (freestyle) lancets; 4 times daily as directed  Dispense: 100 each; Refill: 12        There are no Patient Instructions on file for this visit.

## 2023-01-06 LAB — BACTERIA SPEC AEROBE CULT: NORMAL

## 2023-01-11 ENCOUNTER — CLINICAL SUPPORT (OUTPATIENT)
Dept: FAMILY MEDICINE CLINIC | Facility: CLINIC | Age: 58
End: 2023-01-11
Payer: MEDICAID

## 2023-01-11 DIAGNOSIS — E78.5 HYPERLIPIDEMIA, UNSPECIFIED HYPERLIPIDEMIA TYPE: ICD-10-CM

## 2023-01-11 DIAGNOSIS — Z79.4 TYPE 2 DIABETES MELLITUS WITH HYPERGLYCEMIA, WITH LONG-TERM CURRENT USE OF INSULIN: ICD-10-CM

## 2023-01-11 DIAGNOSIS — E03.9 HYPOTHYROIDISM, UNSPECIFIED TYPE: ICD-10-CM

## 2023-01-11 DIAGNOSIS — E11.65 TYPE 2 DIABETES MELLITUS WITH HYPERGLYCEMIA, WITH LONG-TERM CURRENT USE OF INSULIN: ICD-10-CM

## 2023-01-11 LAB
ALBUMIN SERPL-MCNC: 3.4 G/DL (ref 3.5–5.2)
ALBUMIN/GLOB SERPL: 0.7 G/DL
ALP SERPL-CCNC: 104 U/L (ref 39–117)
ALT SERPL W P-5'-P-CCNC: 13 U/L (ref 1–33)
ANION GAP SERPL CALCULATED.3IONS-SCNC: 13.9 MMOL/L (ref 5–15)
AST SERPL-CCNC: 14 U/L (ref 1–32)
BASOPHILS # BLD AUTO: 0.07 10*3/MM3 (ref 0–0.2)
BASOPHILS NFR BLD AUTO: 0.6 % (ref 0–1.5)
BILIRUB SERPL-MCNC: <0.2 MG/DL (ref 0–1.2)
BUN SERPL-MCNC: 25 MG/DL (ref 6–20)
BUN/CREAT SERPL: 12.3 (ref 7–25)
CALCIUM SPEC-SCNC: 9.2 MG/DL (ref 8.6–10.5)
CHLORIDE SERPL-SCNC: 101 MMOL/L (ref 98–107)
CHOLEST SERPL-MCNC: 158 MG/DL (ref 0–200)
CO2 SERPL-SCNC: 22.1 MMOL/L (ref 22–29)
CREAT SERPL-MCNC: 2.03 MG/DL (ref 0.57–1)
DEPRECATED RDW RBC AUTO: 52.1 FL (ref 37–54)
EGFRCR SERPLBLD CKD-EPI 2021: 27.8 ML/MIN/1.73
EOSINOPHIL # BLD AUTO: 0.3 10*3/MM3 (ref 0–0.4)
EOSINOPHIL NFR BLD AUTO: 2.8 % (ref 0.3–6.2)
ERYTHROCYTE [DISTWIDTH] IN BLOOD BY AUTOMATED COUNT: 16.6 % (ref 12.3–15.4)
GLOBULIN UR ELPH-MCNC: 4.9 GM/DL
GLUCOSE SERPL-MCNC: 115 MG/DL (ref 65–99)
HBA1C MFR BLD: 6.2 % (ref 3.5–5.6)
HCT VFR BLD AUTO: 25.6 % (ref 34–46.6)
HDLC SERPL-MCNC: 23 MG/DL (ref 40–60)
HGB BLD-MCNC: 8.3 G/DL (ref 12–15.9)
IMM GRANULOCYTES # BLD AUTO: 0.11 10*3/MM3 (ref 0–0.05)
IMM GRANULOCYTES NFR BLD AUTO: 1 % (ref 0–0.5)
LDLC SERPL CALC-MCNC: 103 MG/DL (ref 0–100)
LDLC/HDLC SERPL: 4.27 {RATIO}
LYMPHOCYTES # BLD AUTO: 2.03 10*3/MM3 (ref 0.7–3.1)
LYMPHOCYTES NFR BLD AUTO: 18.8 % (ref 19.6–45.3)
MCH RBC QN AUTO: 27.8 PG (ref 26.6–33)
MCHC RBC AUTO-ENTMCNC: 32.4 G/DL (ref 31.5–35.7)
MCV RBC AUTO: 85.6 FL (ref 79–97)
MONOCYTES # BLD AUTO: 0.6 10*3/MM3 (ref 0.1–0.9)
MONOCYTES NFR BLD AUTO: 5.6 % (ref 5–12)
NEUTROPHILS NFR BLD AUTO: 7.67 10*3/MM3 (ref 1.7–7)
NEUTROPHILS NFR BLD AUTO: 71.2 % (ref 42.7–76)
NRBC BLD AUTO-RTO: 0 /100 WBC (ref 0–0.2)
PLATELET # BLD AUTO: 546 10*3/MM3 (ref 140–450)
PMV BLD AUTO: 10.2 FL (ref 6–12)
POTASSIUM SERPL-SCNC: 4.4 MMOL/L (ref 3.5–5.2)
PROT SERPL-MCNC: 8.3 G/DL (ref 6–8.5)
RBC # BLD AUTO: 2.99 10*6/MM3 (ref 3.77–5.28)
SODIUM SERPL-SCNC: 137 MMOL/L (ref 136–145)
TRIGL SERPL-MCNC: 184 MG/DL (ref 0–150)
TSH SERPL DL<=0.05 MIU/L-ACNC: 0.22 UIU/ML (ref 0.27–4.2)
VLDLC SERPL-MCNC: 32 MG/DL (ref 5–40)
WBC NRBC COR # BLD: 10.78 10*3/MM3 (ref 3.4–10.8)

## 2023-01-11 PROCEDURE — 80050 GENERAL HEALTH PANEL: CPT | Performed by: NURSE PRACTITIONER

## 2023-01-11 PROCEDURE — 80061 LIPID PANEL: CPT | Performed by: NURSE PRACTITIONER

## 2023-01-11 PROCEDURE — 83036 HEMOGLOBIN GLYCOSYLATED A1C: CPT | Performed by: NURSE PRACTITIONER

## 2023-01-11 PROCEDURE — 36415 COLL VENOUS BLD VENIPUNCTURE: CPT | Performed by: NURSE PRACTITIONER

## 2023-01-11 RX ORDER — INSULIN LISPRO 100 [IU]/ML
1 INJECTION, SOLUTION INTRAVENOUS; SUBCUTANEOUS
COMMUNITY

## 2023-01-11 RX ORDER — INSULIN GLARGINE 100 [IU]/ML
23 INJECTION, SOLUTION SUBCUTANEOUS NIGHTLY
Qty: 12 ML | Refills: 3 | Status: SHIPPED | OUTPATIENT
Start: 2023-01-11 | End: 2023-02-10

## 2023-01-11 NOTE — PROGRESS NOTES
Venipuncture Blood Specimen Collection  Venipuncture performed in right arm by Queta Rao MA with good hemostasis. Patient tolerated the procedure well without complications.   01/11/23   VERNON Jacobson, APRN

## 2023-01-13 ENCOUNTER — TELEPHONE (OUTPATIENT)
Dept: FAMILY MEDICINE CLINIC | Facility: CLINIC | Age: 58
End: 2023-01-13
Payer: MEDICAID

## 2023-01-13 NOTE — TELEPHONE ENCOUNTER
HUB TO READ:  ----- Message from MARRY Simpson sent at 1/13/2023  2:29 PM EST -----  Please call pt with lab results.    Kidney function is very low-is she seeing a kidney specialist?  A1C shows good control of diabetes-continue current medications.   Triglycerides and LDL are slightly elevated-limit intake of carbs and saturated fats, exercise as tolerated.

## 2023-01-13 NOTE — TELEPHONE ENCOUNTER
Did she not answer?  I am concerned about her kidney function and need to refer her if she doesn't have a nephrologist

## 2023-01-17 NOTE — TELEPHONE ENCOUNTER
She is concerned about her Kidney functions and if she needs to be seen sooner by Nephro? Or if she needs to be concerned about her Kidneys?

## 2023-01-17 NOTE — TELEPHONE ENCOUNTER
I'm not sure what she was checking on.  I didn't see in the chart that pt had nephrologist and wanted to make sure she had an appt.  Previous message shows appt 2/7/23.  If there is something else she is wanting to know please let me know.

## 2023-01-18 NOTE — TELEPHONE ENCOUNTER
That appointment is fine-her labs are similar to labs drawn in December. I just wanted to make sure I didn't need to refer her.

## 2023-01-23 RX ORDER — FUROSEMIDE 20 MG/1
20 TABLET ORAL DAILY
Qty: 30 TABLET | Refills: 3 | Status: SHIPPED | OUTPATIENT
Start: 2023-01-23

## 2023-01-23 RX ORDER — SITAGLIPTIN AND METFORMIN HYDROCHLORIDE 1000; 100 MG/1; MG/1
1 TABLET, FILM COATED, EXTENDED RELEASE ORAL EVERY EVENING
Qty: 30 TABLET | OUTPATIENT
Start: 2023-01-23

## 2023-01-23 RX ORDER — EMPAGLIFLOZIN 25 MG/1
25 TABLET, FILM COATED ORAL EVERY MORNING
Qty: 30 TABLET | Refills: 3 | Status: SHIPPED | OUTPATIENT
Start: 2023-01-23

## 2023-01-23 RX ORDER — GABAPENTIN 300 MG/1
300 CAPSULE ORAL 3 TIMES DAILY
Qty: 90 CAPSULE | Refills: 3 | Status: SHIPPED | OUTPATIENT
Start: 2023-01-23

## 2023-01-23 RX ORDER — METFORMIN HYDROCHLORIDE 500 MG/1
1000 TABLET, EXTENDED RELEASE ORAL DAILY
Qty: 60 TABLET | Refills: 3 | Status: SHIPPED | OUTPATIENT
Start: 2023-01-23

## 2023-01-23 RX ORDER — LEVOTHYROXINE SODIUM 0.15 MG/1
150 TABLET ORAL DAILY
Qty: 30 TABLET | Refills: 3 | Status: SHIPPED | OUTPATIENT
Start: 2023-01-23

## 2023-01-23 NOTE — TELEPHONE ENCOUNTER
Rx Refill Note  Requested Prescriptions     Pending Prescriptions Disp Refills    Janumet -1000 MG tablet 30 tablet      Sig: Take 1 tablet by mouth Every Evening.    gabapentin (NEURONTIN) 300 MG capsule       Sig: Take 1 capsule by mouth 3 (Three) Times a Day.    metFORMIN ER (GLUCOPHAGE-XR) 500 MG 24 hr tablet       Sig: Take 2 tablets by mouth Daily.    Jardiance 25 MG tablet tablet 30 tablet      Sig: Take 1 tablet by mouth Every Morning.    furosemide (LASIX) 20 MG tablet       Sig: Take 1 tablet by mouth Daily.    levothyroxine (SYNTHROID, LEVOTHROID) 150 MCG tablet       Sig: Take 1 tablet by mouth Daily.      Last office visit with prescribing clinician: 1/5/2023   Last telemedicine visit with prescribing clinician: 4/10/2023   Next office visit with prescribing clinician: 4/10/2023                         Would you like a call back once the refill request has been completed: [] Yes [] No    If the office needs to give you a call back, can they leave a voicemail: [] Yes [] No    Myrtle Brown MA  01/23/23, 09:02 EST

## 2023-01-26 ENCOUNTER — PRIOR AUTHORIZATION (OUTPATIENT)
Dept: FAMILY MEDICINE CLINIC | Facility: CLINIC | Age: 58
End: 2023-01-26
Payer: MEDICAID

## 2023-01-27 ENCOUNTER — PATIENT ROUNDING (BHMG ONLY) (OUTPATIENT)
Dept: FAMILY MEDICINE CLINIC | Facility: CLINIC | Age: 58
End: 2023-01-27
Payer: MEDICAID

## 2023-01-27 NOTE — PROGRESS NOTES
A [employer] message has been sent to the patient for patient rounding with [facility] [place]

## 2023-02-16 ENCOUNTER — OFFICE VISIT (OUTPATIENT)
Dept: PODIATRY | Facility: CLINIC | Age: 58
End: 2023-02-16
Payer: MEDICAID

## 2023-02-16 VITALS — HEIGHT: 61 IN | BODY MASS INDEX: 38.14 KG/M2 | WEIGHT: 202 LBS | RESPIRATION RATE: 20 BRPM

## 2023-02-16 DIAGNOSIS — Z89.612 HISTORY OF LEFT ABOVE KNEE AMPUTATION: ICD-10-CM

## 2023-02-16 DIAGNOSIS — B35.1 ONYCHOMYCOSIS: ICD-10-CM

## 2023-02-16 DIAGNOSIS — E11.42 DM TYPE 2 WITH DIABETIC PERIPHERAL NEUROPATHY: Primary | ICD-10-CM

## 2023-02-16 DIAGNOSIS — L85.3 XEROSIS OF SKIN: ICD-10-CM

## 2023-02-16 PROCEDURE — 99203 OFFICE O/P NEW LOW 30 MIN: CPT | Performed by: PODIATRIST

## 2023-02-16 PROCEDURE — 11720 DEBRIDE NAIL 1-5: CPT | Performed by: PODIATRIST

## 2023-02-16 NOTE — PROGRESS NOTES
"02/16/2023  Foot and Ankle Surgery - New Patient   Provider: Dr. Robert Justin DPM  Location: [facility]    Subjective:  Marilyn Martins is a 57 y.o. female.     Chief Complaint   Patient presents with   • Right Foot - Nail Problem   • Initial Evaluation     S. Martha lemons  1/11/2023       HPI: The patient is a 57-year-old female who presents for issues involving her feet. She is accompanied by an adult female.    She was referred to me by Sondra Thomas APRN, her new APRN. According to the patient, Sondra Thomas APRN works with Alley Esposito MD.    She reports she had a staphylococcus infection in her left lower extremity for 3 years on 02/20/2023 in [place]. She notes that 3 different doctors could not \"catch\" the staphylococcus infection. The patient denies an open wound to her left foot. The adult male reports that the patient underwent multiple MRIs, x-rays, and a CT scan for 18 months.     The adult female states \"that toe has been a little sore\". According to the adult female, the patient's most recent A1c was 6.2 or 6.7 percent, however; at home, her blood glucose at home occasionally elevates to 300 mg/dL after lunch and dinner. In the morning, the patient's blood glucose levels range from 150 mg/dL to 175 mg/dL. The patient's blood glucose level was 141 mg/dL this morning.     The patient's heel is fine. \"She hasn't in a month because she's been extremely busy\". The patient soaks her foot in warm water and apple cider vinegar. The adult male reports that the patient soaked her left foot in warm water and apple cider vinegar. For moisturization, the patient uses \"regular lotion\" for her feet.    The patient was informed by her orthopedic surgeon from below the knee and \"all the way down\", her bone was brittle with \"knobs and stuff on it\" from the knee replacement. She was informed antibiotics would not assist with the brittle bones. The patient had to undergo 2 surgeries since " "the first doctor from [place] placed the foundation upside down.    Her right kidney was removed on 12/2022.  The patient has lymphedema. She attempted to use compression stockings \"every other day\", whenever she would wear it, she would be sick and was unable to eat or drink. The patient used to have her leg wrapped while she was seeing a physical therapist. the physical therapist is also a lymphedema specialist. She denies having a compression pump.    Allergies   Allergen Reactions   • Latex Other (See Comments)   • Morphine And Related Rash   • Cephalexin Diarrhea and Nausea And Vomiting     Extreme vomiting, can't keep food or water down either    • Codeine Other (See Comments)   • Sulfa Antibiotics Other (See Comments)   • Povidone Iodine Rash       Past Medical History:   Diagnosis Date   • Allergic    • Anemia    • Anxiety    • Arthritis    • Asthma    • Cataract    • Cholelithiasis 1987    Gall bladder removed   • Chronic constipation    • Chronic diarrhea    • COPD (chronic obstructive pulmonary disease) ([facility]) 2003   • Depression    • Diabetes mellitus ([facility])    • Diverticulosis 2022   • GERD (gastroesophageal reflux disease) 2005   • Headache    • Heart murmur 2007    Sometimes heard   • HL (hearing loss) 2012    Left ear drum is busted, hole in right ear, get excess of build up in both ears   • Hyperlipidemia 2000   • Hypothyroidism 1986    Uncontroled   • Kidney stone 2022    Left side two removed by lazer surgery,right kidney removed Dec.8th of this year.   • Low back pain 2001   • Obesity 1989   • Pneumonia 2022 March of this year   • Renal insufficiency 2016   • Shortness of breath    • Sinusitis    • Substance abuse ([facility])    • Urinary tract infection    • Visual impairment 1985       Past Surgical History:   Procedure Laterality Date   • ABOVE KNEE AMPUTATION Left 02/2000   • CHOLECYSTECTOMY  2002   • COLONOSCOPY  2004   • EYE SURGERY  2014    Carrects   • HERNIA REPAIR     • JOINT " REPLACEMENT     • SUBTOTAL HYSTERECTOMY         Family History   Problem Relation Age of Onset   • Anxiety disorder Daughter    • Developmental Disability Daughter    • Diabetes Daughter         Pre diabetic with first child   • Learning disabilities Daughter    • Mental illness Daughter         Cutter, anything to harm herself, hanging, burning etc.   • Anxiety disorder Daughter    • Developmental Disability Daughter    • Diabetes Daughter    • Learning disabilities Daughter    • Mental illness Daughter         Cutters   • Developmental Disability Son    • Learning disabilities Son        Social History     Socioeconomic History   • Marital status:    Tobacco Use   • Smoking status: Never   Vaping Use   • Vaping Use: Never used   Substance and Sexual Activity   • Alcohol use: Yes     Comment: Wine coolers and sleeping pills together 1996   • Drug use: Never   • Sexual activity: Not Currently     Partners: Male     Birth control/protection: Hysterectomy        Current Outpatient Medications on File Prior to Visit   Medication Sig Dispense Refill   • furosemide (LASIX) 20 MG tablet Take 1 tablet by mouth Daily. 30 tablet 3   • gabapentin (NEURONTIN) 300 MG capsule Take 1 capsule by mouth 3 (Three) Times a Day. 90 capsule 3   • Insulin Lispro (humaLOG) 100 UNIT/ML injection Inject 1 Units under the skin into the appropriate area as directed 3 (Three) Times a Day Before Meals. SS for bloodsugars >150 5 u, 151-200 7 u, 201-250 9 u, 251-300 11 u, 301-350 13 u, 351-400 15 u     • Jardiance 25 MG tablet tablet Take 1 tablet by mouth Every Morning. 30 tablet 3   • levothyroxine (SYNTHROID, LEVOTHROID) 150 MCG tablet Take 1 tablet by mouth Daily. 30 tablet 3   • Myrbetriq 25 MG tablet sustained-release 24 hour 24 hr tablet      • Omega-3 Fatty Acids (fish oil) 1000 MG capsule capsule Take  by mouth.     • vitamin B-12 (CYANOCOBALAMIN) 100 MCG tablet Take 50 mcg by mouth Daily.     • vitamin E 100 UNIT capsule Take  "100 Units by mouth Daily.     • glucose blood (FREESTYLE TEST STRIPS) test strip 4 times daily as directed 200 each 12   • glucose monitoring kit (FREESTYLE) monitoring kit 1 each 4 (Four) Times a Day. E11.65 1 each 0   • Insulin Glargine (BASAGLAR KWIKPEN) 100 UNIT/ML injection pen Inject 23 Units under the skin into the appropriate area as directed Every Night for 30 days. 12 mL 3   • Lancets (freestyle) lancets 4 times daily as directed 100 each 12   • metFORMIN ER (GLUCOPHAGE-XR) 500 MG 24 hr tablet Take 2 tablets by mouth Daily. 60 tablet 3   • nitrofurantoin, macrocrystal-monohydrate, (Macrobid) 100 MG capsule Take 1 capsule by mouth 2 (Two) Times a Day. 10 capsule 0     No current facility-administered medications on file prior to visit.       Review of Systems:  General: Denies fever, chills, fatigue, and weakness.  Eyes: Denies vision loss, blurry vision, and excessive redness.  ENT: Denies hearing issues and difficulty swallowing.  Cardiovascular: Denies palpitations, chest pain, or syncopal episodes.  Respiratory: Denies shortness of breath, wheezing, and coughing.  GI: Denies abdominal pain, nausea, and vomiting.   : Denies frequency, hematuria, and urgency.  Musculoskeletal: Denies muscle cramps, joint pains, and stiffness.  Derm: Denies rash, open wounds, or suspicious lesions.  Neuro: Denies headaches, numbness, loss of coordination, and tremors.  Psych: Denies anxiety and depression.  Endocrine: Denies temperature intolerance and changes in appetite.  Heme: Denies bleeding disorders or abnormal bruising.     Objective   Resp 20   Ht 154.9 cm (61\")   Wt 91.6 kg (202 lb)   BMI 38.17 kg/m²     Foot/Ankle Exam:       General:   Appearance: appears stated age and healthy    Orientation: AAOx3    Affect: appropriate      VASCULAR      Right Foot Vascularity   Normal vascular exam    Dorsalis pedis:  2+  Posterior tibial:  2+  Skin Temperature: warm    Edema Gradin+  CFT:  < 3 seconds  Pedal Hair " Growth:  Absent  Varicosities: none        NEUROLOGIC     Right Foot Neurologic   Light touch sensation:  Diminished  Hot/Cold sensation: diminished    Protective Sensation using [place] Monofilament:  Diminished  Achilles reflex:  2+     MUSCULOSKELETAL      Right Foot Musculoskeletal   Ecchymosis:  None  Tenderness: none    Arch:  Normal     Left Foot Musculoskeletal    Amputation   Above left knee: Yes       MUSCLE STRENGTH     Right Foot Muscle Strength   Normal strength    Foot dorsiflexion:  5  Foot plantar flexion:  5  Foot inversion:  5  Foot eversion:  5     DERMATOLOGIC     Right Foot Dermatologic   Skin: dry skin    Skin: no right foot ulcer    Nails: onychomycosis, abnormally thick and dystrophic nails    Nails comment:  Nails 1-5     Left Foot Dermatologic   Nails comment:  Nails 1-5     TESTS     Right Foot Tests   Anterior drawer: negative    Varus tilt: negative        Assessment & Plan   Diagnoses and all orders for this visit:    1. DM type 2 with diabetic peripheral neuropathy (HCC) (Primary)    2. Onychomycosis    3. Xerosis of skin    4. History of left above knee amputation (HCC)      Patient is a 57-year-old diabetic female that presents for diabetic foot check.  Patient was sent by her primary care physician.  She does not have any open wounds or concerns but states that she does need someone to help trim her nails.  Patient denies any open wound or infection involving her feet.  She did however require a left above-the-knee amputation for issues of infection after multiple knee replacements.  Patient does have thick xerotic skin involving the heel.  I have asked that she apply Aquaphor twice daily.  We also discussed proper nail care and nails were debrided today without complication.  I have asked that she check her foot on a routine basis.  We did discuss the importance of glycemic control.  Patient is to return in 3 months for routine foot check with EUSEBIO Estrella.  Greater than 30  minutes spent before, during, and after evaluation for patient care.    Nail debridement: Both feet x5    Consent and time out was performed before proceeding with the procedure. Nails were debrided with a nail nipper without complication.  No anesthesia was required.  Indications for procedure were thickened, dystrophic, and fungal appearing nails which are difficult to trim.  Proper self-care and technique was discussed with patient.  Patient was stable after procedure.      No orders of the defined types were placed in this encounter.       Note is dictated utilizing voice recognition software. Unfortunately this leads to occasional typographical errors. I apologize in advance if the situation occurs. If questions occur please do not hesitate to call our office.    Transcribed from ambient dictation for MAURI Justin DPM by Katina Aguilar.  02/16/23   11:18 EST    Patient or patient representative verbalized consent to the visit recording.  I have personally performed the services described in this document as transcribed by the above individual, and it is both accurate and complete.

## 2023-04-10 ENCOUNTER — OFFICE VISIT (OUTPATIENT)
Dept: FAMILY MEDICINE CLINIC | Facility: CLINIC | Age: 58
End: 2023-04-10
Payer: MEDICAID

## 2023-04-10 VITALS
HEART RATE: 107 BPM | HEIGHT: 61 IN | BODY MASS INDEX: 41.12 KG/M2 | SYSTOLIC BLOOD PRESSURE: 111 MMHG | WEIGHT: 217.8 LBS | DIASTOLIC BLOOD PRESSURE: 71 MMHG | OXYGEN SATURATION: 95 % | TEMPERATURE: 98.2 F

## 2023-04-10 DIAGNOSIS — H69.83 EUSTACHIAN TUBE DYSFUNCTION, BILATERAL: ICD-10-CM

## 2023-04-10 DIAGNOSIS — J02.9 SORE THROAT: Primary | ICD-10-CM

## 2023-04-10 DIAGNOSIS — E11.65 TYPE 2 DIABETES MELLITUS WITH HYPERGLYCEMIA, WITH LONG-TERM CURRENT USE OF INSULIN: ICD-10-CM

## 2023-04-10 DIAGNOSIS — Z79.4 TYPE 2 DIABETES MELLITUS WITH HYPERGLYCEMIA, WITH LONG-TERM CURRENT USE OF INSULIN: ICD-10-CM

## 2023-04-10 LAB
ALBUMIN SERPL-MCNC: 3.4 G/DL (ref 3.5–5.2)
ALBUMIN/GLOB SERPL: 0.8 G/DL
ALP SERPL-CCNC: 117 U/L (ref 39–117)
ALT SERPL W P-5'-P-CCNC: 11 U/L (ref 1–33)
ANION GAP SERPL CALCULATED.3IONS-SCNC: 13.2 MMOL/L (ref 5–15)
AST SERPL-CCNC: 15 U/L (ref 1–32)
BASOPHILS # BLD AUTO: 0.05 10*3/MM3 (ref 0–0.2)
BASOPHILS NFR BLD AUTO: 0.5 % (ref 0–1.5)
BILIRUB SERPL-MCNC: 0.2 MG/DL (ref 0–1.2)
BUN SERPL-MCNC: 33 MG/DL (ref 6–20)
BUN/CREAT SERPL: 20.1 (ref 7–25)
CALCIUM SPEC-SCNC: 9.6 MG/DL (ref 8.6–10.5)
CHLORIDE SERPL-SCNC: 99 MMOL/L (ref 98–107)
CHOLEST SERPL-MCNC: 138 MG/DL (ref 0–200)
CO2 SERPL-SCNC: 22.8 MMOL/L (ref 22–29)
CREAT SERPL-MCNC: 1.64 MG/DL (ref 0.57–1)
DEPRECATED RDW RBC AUTO: 43.6 FL (ref 37–54)
EGFRCR SERPLBLD CKD-EPI 2021: 35.9 ML/MIN/1.73
EOSINOPHIL # BLD AUTO: 0.32 10*3/MM3 (ref 0–0.4)
EOSINOPHIL NFR BLD AUTO: 2.9 % (ref 0.3–6.2)
ERYTHROCYTE [DISTWIDTH] IN BLOOD BY AUTOMATED COUNT: 13.9 % (ref 12.3–15.4)
EXPIRATION DATE: NORMAL
GLOBULIN UR ELPH-MCNC: 4.1 GM/DL
GLUCOSE SERPL-MCNC: 87 MG/DL (ref 65–99)
HBA1C MFR BLD: 7.2 % (ref 4.8–5.6)
HCT VFR BLD AUTO: 31.9 % (ref 34–46.6)
HDLC SERPL-MCNC: 25 MG/DL (ref 40–60)
HGB BLD-MCNC: 10.6 G/DL (ref 12–15.9)
IMM GRANULOCYTES # BLD AUTO: 0.03 10*3/MM3 (ref 0–0.05)
IMM GRANULOCYTES NFR BLD AUTO: 0.3 % (ref 0–0.5)
INTERNAL CONTROL: NORMAL
LDLC SERPL CALC-MCNC: 87 MG/DL (ref 0–100)
LDLC/HDLC SERPL: 3.34 {RATIO}
LYMPHOCYTES # BLD AUTO: 1.98 10*3/MM3 (ref 0.7–3.1)
LYMPHOCYTES NFR BLD AUTO: 18.1 % (ref 19.6–45.3)
Lab: NORMAL
MCH RBC QN AUTO: 28.6 PG (ref 26.6–33)
MCHC RBC AUTO-ENTMCNC: 33.2 G/DL (ref 31.5–35.7)
MCV RBC AUTO: 86.2 FL (ref 79–97)
MONOCYTES # BLD AUTO: 0.87 10*3/MM3 (ref 0.1–0.9)
MONOCYTES NFR BLD AUTO: 8 % (ref 5–12)
NEUTROPHILS NFR BLD AUTO: 7.67 10*3/MM3 (ref 1.7–7)
NEUTROPHILS NFR BLD AUTO: 70.2 % (ref 42.7–76)
NRBC BLD AUTO-RTO: 0 /100 WBC (ref 0–0.2)
PLATELET # BLD AUTO: 326 10*3/MM3 (ref 140–450)
PMV BLD AUTO: 11 FL (ref 6–12)
POTASSIUM SERPL-SCNC: 4 MMOL/L (ref 3.5–5.2)
PROT SERPL-MCNC: 7.5 G/DL (ref 6–8.5)
RBC # BLD AUTO: 3.7 10*6/MM3 (ref 3.77–5.28)
S PYO AG THROAT QL: NEGATIVE
SODIUM SERPL-SCNC: 135 MMOL/L (ref 136–145)
TRIGL SERPL-MCNC: 147 MG/DL (ref 0–150)
TSH SERPL DL<=0.05 MIU/L-ACNC: 0.31 UIU/ML (ref 0.27–4.2)
VLDLC SERPL-MCNC: 26 MG/DL (ref 5–40)
WBC NRBC COR # BLD: 10.92 10*3/MM3 (ref 3.4–10.8)

## 2023-04-10 PROCEDURE — 80050 GENERAL HEALTH PANEL: CPT | Performed by: NURSE PRACTITIONER

## 2023-04-10 PROCEDURE — 80061 LIPID PANEL: CPT | Performed by: NURSE PRACTITIONER

## 2023-04-10 PROCEDURE — 83036 HEMOGLOBIN GLYCOSYLATED A1C: CPT | Performed by: NURSE PRACTITIONER

## 2023-04-10 RX ORDER — BLOOD-GLUCOSE METER
1 EACH MISCELLANEOUS
Qty: 1 KIT | Refills: 0 | Status: SHIPPED | OUTPATIENT
Start: 2023-04-10

## 2023-04-10 RX ORDER — CETIRIZINE HYDROCHLORIDE 10 MG/1
10 TABLET ORAL DAILY
Qty: 30 TABLET | Refills: 3 | Status: SHIPPED | OUTPATIENT
Start: 2023-04-10

## 2023-04-10 RX ORDER — INSULIN LISPRO 100 [IU]/ML
1 INJECTION, SOLUTION INTRAVENOUS; SUBCUTANEOUS
Qty: 10 ML | Refills: 0 | Status: SHIPPED | OUTPATIENT
Start: 2023-04-10 | End: 2023-05-10

## 2023-04-10 RX ORDER — LANCETS
EACH MISCELLANEOUS
Qty: 100 EACH | Refills: 12 | Status: SHIPPED | OUTPATIENT
Start: 2023-04-10

## 2023-04-10 RX ORDER — INSULIN GLARGINE 100 [IU]/ML
23 INJECTION, SOLUTION SUBCUTANEOUS NIGHTLY
Qty: 7 ML | Refills: 0 | Status: SHIPPED | OUTPATIENT
Start: 2023-04-10 | End: 2023-05-10

## 2023-04-10 NOTE — PROGRESS NOTES
Venipuncture performed on Right Arm by Queta Rao MA  with good hemostasis. Patient tolerated well. 04/10/23 MARRY Simpson

## 2023-04-27 RX ORDER — EMPAGLIFLOZIN 25 MG/1
TABLET, FILM COATED ORAL
Qty: 90 TABLET | Refills: 0 | Status: SHIPPED | OUTPATIENT
Start: 2023-04-27

## 2023-05-05 DIAGNOSIS — Z12.31 SCREENING MAMMOGRAM FOR BREAST CANCER: Primary | ICD-10-CM

## 2023-05-16 ENCOUNTER — OFFICE VISIT (OUTPATIENT)
Dept: PODIATRY | Facility: CLINIC | Age: 58
End: 2023-05-16
Payer: MEDICAID

## 2023-05-16 VITALS — BODY MASS INDEX: 40.97 KG/M2 | RESPIRATION RATE: 20 BRPM | WEIGHT: 217 LBS | HEIGHT: 61 IN

## 2023-05-16 DIAGNOSIS — Z89.612 HISTORY OF LEFT ABOVE KNEE AMPUTATION: ICD-10-CM

## 2023-05-16 DIAGNOSIS — I87.2 VENOUS INSUFFICIENCY: ICD-10-CM

## 2023-05-16 DIAGNOSIS — I89.0 LYMPHEDEMA: ICD-10-CM

## 2023-05-16 DIAGNOSIS — E11.42 DM TYPE 2 WITH DIABETIC PERIPHERAL NEUROPATHY: Primary | ICD-10-CM

## 2023-05-16 DIAGNOSIS — L85.3 XEROSIS OF SKIN: ICD-10-CM

## 2023-05-16 DIAGNOSIS — B35.1 ONYCHOMYCOSIS: ICD-10-CM

## 2023-05-16 RX ORDER — LEVOFLOXACIN 250 MG/1
1 TABLET ORAL DAILY
COMMUNITY
Start: 2023-05-09

## 2023-05-16 RX ORDER — SODIUM BICARBONATE 650 MG/1
1 TABLET ORAL 3 TIMES DAILY
COMMUNITY
Start: 2023-04-27

## 2023-05-16 RX ORDER — METFORMIN HYDROCHLORIDE 500 MG/1
2 TABLET, EXTENDED RELEASE ORAL DAILY
COMMUNITY
Start: 2023-04-18

## 2023-05-16 NOTE — PROGRESS NOTES
05/16/2023  Foot and Ankle Surgery - Established Patient/Follow-up  Provider: MARRY Vasquez   Location: [facility]    Subjective:  Marilyn Martins is a 57 y.o. female.     Chief Complaint   Patient presents with   • Right Foot - Diabetes     Dm foot care   • Left Foot - Diabetes     Dm foot care   • Follow-up     OLIVERIO Thomas  April 10 ,2023       The patient presents today for a routine diabetic foot check. She is accompanied by an adult female.    She states she has a left above the knee amputation. She communicates having a staph infection that was not caught in time by the doctors. She recalls having an issue with edema to her lower right extremity. She adds she was seeing a physical therapist who specializes in lymphedema in the past. She reports she is unable to wear shoes due to her lymphedema. She was informed about having poor circulation in the past. She denies having stents to her lower right extremity. The patient states she was trying to wear compression stockings; unfortunately, at that time she was having kidney issues. In 2022 she had her right kidney removed. She adds she was wearing a compression stocking, but it made her sick. She states she watches her sodium intake. She has been diabetic for approximately 30 years and is insulin dependent. She reports having no insulin or diabetic medication when she had her last A1C checked. She states she has an upcoming appointment to get laboratory work done.     The patient complains of toenail fungus. She utilizes a wheelchair. It has been 3 months since her last toenail trim. The adult  states the patients primary doctor recommended the patient do Epsom salt soaks to help with dry skin.    Allergies   Allergen Reactions   • Latex Other (See Comments)   • Morphine And Related Rash   • Cephalexin Diarrhea and Nausea And Vomiting     Extreme vomiting, can't keep food or water down either    • Codeine Other (See Comments)   • Sulfa  Antibiotics Other (See Comments)   • Povidone Iodine Rash       Current Outpatient Medications on File Prior to Visit   Medication Sig Dispense Refill   • albuterol sulfate  (90 Base) MCG/ACT inhaler Inhale 2 puffs every 6 hours as needed for wheezing and shortness of breath 18 g 0   • Blood Glucose Monitoring Suppl (Accu-Chek Guide) w/Device kit 1 each 4 (Four) Times a Day After Meals & at Bedtime. 1 kit 0   • fexofenadine ODT (ALLEGRA ODT) 30 MG disintegrating tablet Place 1 tablet on the tongue 2 (Two) Times a Day As Needed for Allergies (Use as needed for sinus drainage.) for up to 30 days. 60 tablet 0   • furosemide (LASIX) 20 MG tablet Take 1 tablet by mouth Daily. 30 tablet 3   • gabapentin (NEURONTIN) 300 MG capsule Take 1 capsule by mouth 3 (Three) Times a Day. 90 capsule 3   • glucose blood (Accu-Chek Guide) test strip Use as instructed 100 each 12   • Jardiance 25 MG tablet tablet TAKE 1 TABLET BY MOUTH ONCE DAILY IN THE MORNING 90 tablet 0   • Lancets (accu-chek multiclix) lancets Check blood sugars before meals and at bedtime 100 each 12   • levoFLOXacin (LEVAQUIN) 250 MG tablet Take 1 tablet by mouth Daily.     • levothyroxine (SYNTHROID, LEVOTHROID) 150 MCG tablet Take 1 tablet by mouth Daily. 30 tablet 3   • metFORMIN ER (GLUCOPHAGE-XR) 500 MG 24 hr tablet Take 2 tablets by mouth Daily.     • Myrbetriq 25 MG tablet sustained-release 24 hour 24 hr tablet      • Omega-3 Fatty Acids (fish oil) 1000 MG capsule capsule Take  by mouth.     • sodium bicarbonate 650 MG tablet Take 1 tablet by mouth 3 (Three) Times a Day.     • vitamin B-12 (CYANOCOBALAMIN) 100 MCG tablet Take 50 mcg by mouth Daily.     • vitamin E 100 UNIT capsule Take 1 capsule by mouth Daily.     • insulin glargine (Lantus) 100 UNIT/ML injection Inject 23 Units under the skin into the appropriate area as directed Every Night for 30 days. 7 mL 0   • Insulin Lispro (humaLOG) 100 UNIT/ML injection Inject 1 Units under the skin into the  "appropriate area as directed 3 (Three) Times a Day Before Meals for 30 days. SS for bloodsugars >150 5 u, 151-200 7 u, 201-250 9 u, 251-300 11 u, 301-350 13 u, 351-400 15 u 10 mL 0     No current facility-administered medications on file prior to visit.       Objective   Resp 20   Ht 154.9 cm (61\")   Wt 98.4 kg (217 lb)   BMI 41.00 kg/m²     Foot/Ankle Exam    GENERAL  Diabetic foot exam performed    Appearance:  appears stated age and obese  Orientation:  AAOx3  Affect:  appropriate  Assistance:  wheelchair  Right shoe gear: sock    VASCULAR     Right Foot Vascularity   Dorsalis pedis:  2+  Posterior tibial:  2+  Skin temperature:  cool  Edema grading:  Non-pitting and pitting  CFT:  < 3 seconds  Pedal hair growth:  Present  Varicosities:  none     NEUROLOGIC     Right Foot Neurologic   Light touch sensation: normal  Protective Sensation using [place]-Yao Monofilament:   Sites intact: 9  Sites tested: 10    MUSCULOSKELETAL     Right Foot Musculoskeletal   Ecchymosis:  none  Tenderness:  none       Left Foot Musculoskeletal    Amputation   Above left knee: Yes      DERMATOLOGIC      Right Foot Dermatologic   Skin  Positive for dryness, maceration and skin changes.   Nails  1.  Positive for elongated, onychomycosis, abnormal thickness and dystrophic nail.  2.  Positive for elongated, onychomycosis, abnormal thickness and dystrophic nail.  3.  Positive for elongated, onychomycosis, abnormal thickness and dystrophic nail.  4.  Positive for elongated, onychomycosis, abnormal thickness and dystrophic nail.  5.  Positive for elongated, onychomycosis, abnormal thickness and dystrophic nail.     Right foot additional comments: Pitting and nonpitting edema to the right foot maceration cool, +2 pedal pulse and dorsalis pedis.      Assessment & Plan   Diagnoses and all orders for this visit:    1. DM type 2 with diabetic peripheral neuropathy (Primary)    2. Onychomycosis    3. Xerosis of skin    4. History of left " above knee amputation      Do feel patient it at mild to moderate risk for pedal complications related to diabetes. Recommend the patient start wearing compression stocking. Advised the patient to keep feet clean, dry, and toenails trimmed can help the symptoms of toenail fungus. Recommend Urea cream. Will place order for lymphedema pumps. Follow-up in 3 months.     Patient has tried compression elevation and exercise with continued symptoms of swelling to right lower extremity.  Do recommend patient obtain lymphedema pumps to help control lower extremity edema.    Explained importance of diabetic foot care, daily foot checks, and glycemic control. Patient should check both feet on a daily basis, monitor and control blood sugars, make sure that both feet and in between toes are towel dried after baths or showers. Avoid barefoot walking at all times. Check shoes before putting them on.   Patient was given information on proper foot care. Call the office at the first signs of a wound or with signs of infection.    Nail debridement: Right foot x5    Consent and time out was performed before proceeding with the procedure. Nails were debrided with a nail nipper without complication.  No anesthesia was required.  Indications for procedure were thickened, dystrophic, and fungal appearing nails which are difficult to trim.  Proper self-care and technique was discussed with patient.  Patient was stable after procedure.    No orders of the defined types were placed in this encounter.      Transcribed from ambient dictation for MARRY Paula by Herson Evangelista.  05/16/23   12:34 EDT    Patient or patient representative verbalized consent to the visit recording.  I have personally performed the services described in this document as transcribed by the above individual, and it is both accurate and complete.  MARRY Paula  5/16/2023  14:54 EDT

## 2023-05-19 ENCOUNTER — PATIENT ROUNDING (BHMG ONLY) (OUTPATIENT)
Dept: PODIATRY | Facility: CLINIC | Age: 58
End: 2023-05-19
Payer: MEDICAID

## 2023-05-30 RX ORDER — LEVOTHYROXINE SODIUM 0.15 MG/1
TABLET ORAL
Qty: 30 TABLET | Refills: 0 | Status: SHIPPED | OUTPATIENT
Start: 2023-05-30

## 2023-06-05 ENCOUNTER — TELEPHONE (OUTPATIENT)
Dept: ENDOCRINOLOGY | Facility: CLINIC | Age: 58
End: 2023-06-05
Payer: MEDICAID

## 2023-06-05 NOTE — TELEPHONE ENCOUNTER
Sw pts sister Shashi (HIPAA approved) and rescheduled pts appt with Dr. Narvaez on 7/19/23 at 0800.

## 2023-06-05 NOTE — TELEPHONE ENCOUNTER
Caller: ZAYRA AMOR    Relationship to patient: Emergency Contact    Best call back number: [phone]    Patient is needing: PT RECEIVED TEXT MESSAGE REGARDING NEW PROVIDER APPT EARLIER THAN THE ONE CURRENTLY PRANAV WITH NEREYDA. PT WOULD LIKE THAT EARLIER APPT.

## 2023-07-21 NOTE — TELEPHONE ENCOUNTER
Rx Refill Note  Requested Prescriptions     Pending Prescriptions Disp Refills   • furosemide (LASIX) 20 MG tablet 30 tablet 3     Sig: Take 1 tablet by mouth Daily.      Last office visit with prescribing clinician: 7/11/2023      Next office visit with prescribing clinician: 10/11/2023   3}  Mago Redd  07/21/23, 09:08 EDT

## 2023-07-24 RX ORDER — FUROSEMIDE 20 MG/1
20 TABLET ORAL DAILY
Qty: 30 TABLET | Refills: 3 | Status: SHIPPED | OUTPATIENT
Start: 2023-07-24

## 2023-08-04 ENCOUNTER — SPECIALTY PHARMACY (OUTPATIENT)
Dept: ENDOCRINOLOGY | Facility: CLINIC | Age: 58
End: 2023-08-04
Payer: MEDICAID

## 2023-08-09 ENCOUNTER — HOSPITAL ENCOUNTER (OUTPATIENT)
Dept: RESPIRATORY THERAPY | Facility: HOSPITAL | Age: 58
Discharge: HOME | End: 2023-08-09
Admitting: NURSE PRACTITIONER
Payer: MEDICAID

## 2023-08-09 DIAGNOSIS — R06.02 SHORTNESS OF BREATH: ICD-10-CM

## 2023-08-09 PROCEDURE — 94060 EVALUATION OF WHEEZING: CPT

## 2023-08-09 PROCEDURE — 94726 PLETHYSMOGRAPHY LUNG VOLUMES: CPT

## 2023-08-09 PROCEDURE — 94640 AIRWAY INHALATION TREATMENT: CPT

## 2023-08-09 RX ORDER — ALBUTEROL SULFATE 2.5 MG/3ML
2.5 SOLUTION RESPIRATORY (INHALATION) ONCE
Status: COMPLETED | OUTPATIENT
Start: 2023-08-09 | End: 2023-08-09

## 2023-08-09 RX ADMIN — ALBUTEROL SULFATE 2.5 MG: 2.5 SOLUTION RESPIRATORY (INHALATION) at 10:59

## 2023-08-10 ENCOUNTER — SPECIALTY PHARMACY (OUTPATIENT)
Dept: ENDOCRINOLOGY | Facility: CLINIC | Age: 58
End: 2023-08-10
Payer: MEDICAID

## 2023-08-10 RX ORDER — GABAPENTIN 300 MG/1
300 CAPSULE ORAL 3 TIMES DAILY
Qty: 90 CAPSULE | Refills: 3 | Status: SHIPPED | OUTPATIENT
Start: 2023-08-10

## 2023-08-10 NOTE — PROGRESS NOTES
"Specialty Pharmacy Refill Coordination Note     Marilyn \"Delphine\" is a 58 y.o. female contacted today regarding refills of her specialty medication(s).    Specialty medication(s) and dose(s) confirmed: yes  Changes to medications: no  Changes to insurance: no  Reviewed and verified with patient:         Refill Questions      Flowsheet Row Most Recent Value   Changes to allergies? No   Changes to medications? No   New conditions since last clinic visit No   Unplanned office visit, urgent care, ED, or hospital admission in the last 4 weeks  No   How does patient/caregiver feel medication is working? Good   Financial problems or insurance changes  No   Since the previous refill, were any specialty medication doses or scheduled injections missed or delayed?  No   Does this patient require a clinical escalation to a pharmacist? No            Delivery Questions      Flowsheet Row Most Recent Value   Delivery method  at Pharmacy   Number of medications in delivery 2   Medication being filled and delivered Zyrtec, Levothyroxine   Doses left of specialty medications 1 week   Is there any medication that is due not being filled? No   Supplies needed? No supplies needed   Cooler needed? No   Do any medications need mixed or dated? No   Additional comments 0.00   Questions or concerns for the pharmacist? No   Explain any questions or concerns for the pharmacist n/a   Are any medications first time fills? No                 Follow-up: 1  month(s)     Shirley Arellano, Pharmacy Technician  Specialty Pharmacy Technician   8/10/2023   11:06 EDT      "

## 2023-08-10 NOTE — TELEPHONE ENCOUNTER
Rx Refill Note  Requested Prescriptions     Pending Prescriptions Disp Refills    gabapentin (NEURONTIN) 300 MG capsule 90 capsule 3     Sig: Take 1 capsule by mouth 3 (Three) Times a Day.      Last office visit with prescribing clinician: 7/11/2023   Last telemedicine visit with prescribing clinician: Visit date not found   Next office visit with prescribing clinician: 10/11/2023                         Would you like a call back once the refill request has been completed: [] Yes [] No    If the office needs to give you a call back, can they leave a voicemail: [] Yes [] No    Myrtle Brown MA  08/10/23, 11:03 EDT

## 2023-08-11 LAB
T4 FREE SERPL-MCNC: 0.88 NG/DL (ref 0.82–1.77)
TSH SERPL DL<=0.005 MIU/L-ACNC: 33.8 UIU/ML (ref 0.45–4.5)

## 2023-08-15 ENCOUNTER — OFFICE VISIT (OUTPATIENT)
Dept: PODIATRY | Facility: CLINIC | Age: 58
End: 2023-08-15
Payer: MEDICAID

## 2023-08-15 VITALS — WEIGHT: 244 LBS | BODY MASS INDEX: 46.07 KG/M2 | RESPIRATION RATE: 20 BRPM | HEIGHT: 61 IN

## 2023-08-15 DIAGNOSIS — B35.1 ONYCHOMYCOSIS: ICD-10-CM

## 2023-08-15 DIAGNOSIS — Z89.612 HISTORY OF LEFT ABOVE KNEE AMPUTATION: ICD-10-CM

## 2023-08-15 DIAGNOSIS — L85.3 XEROSIS OF SKIN: ICD-10-CM

## 2023-08-15 DIAGNOSIS — E11.42 DM TYPE 2 WITH DIABETIC PERIPHERAL NEUROPATHY: Primary | ICD-10-CM

## 2023-08-15 DIAGNOSIS — I89.0 LYMPHEDEMA: ICD-10-CM

## 2023-08-15 DIAGNOSIS — R09.89 DECREASED PEDAL PULSES: ICD-10-CM

## 2023-08-15 NOTE — PROGRESS NOTES
08/15/2023  Foot and Ankle Surgery - Established Patient/Follow-up  Provider: MARRY Vasquez   Location: [facility]    Subjective:  Marilyn Martins is a 58 y.o. female.     Chief Complaint   Patient presents with    Left Foot - Pain     Dm foot care    Right Foot - Pain    Follow-up     OLIVERIO Thomas 8/9/23       The patient presents to the office today for routine diabetic foot examination. She is accompanied by an adult female.    The patient states she is doing well and denies any burning or tingling in her feet. She desires a nail trim today. She reports she was informed in the past she has poor circulation to her feet, but she denies any lower extremity stenting. The patient notes her lower extremities are swollen secondary to not elevating them today, although she notes swelling is present when at rest. She denies previously discussing obtaining an SY test. She states she uses a wheelchair except when sitting in a chair at home. The patient denies wearing a shoe at home. The adult female states she obtained lotion for the patient's feet; however, she is not applying it. She adds that the patient does not cleanse well between her toes, but she does soak her feet.     The adult female adds that the patient was denied a lymphedema pump secondary to insurance denial.     The patient states she is wheezing intermittently today secondary to a recent cough. The adult female notes the patient becomes symptomatic due to allergy exacerbation and weather changes.     The patient states her blood glucose continues to be elevated. However, she is now on Ozempic and has had 4 doses which she appears to be tolerating well. She reports she is following up with the endocrinologist tomorrow, 08/16/2023 where her dosage will likely be increased. The patient states her blood glucose has been in the 123 mg/dL to 153 mg/dL range in the morning and 200 mg/dL or higher in the afternoon and evening. She recalls her last  "A1c was approximately 7.6 percent at her last evaluation on 08/07/2023. The adult female notes this is an improvement as her A1c was previously 7.8 percent. She reportedly obtains all of her medications from the [facility].     Allergies   Allergen Reactions    Latex Other (See Comments)    Morphine And Related Rash    Cephalexin Diarrhea and Nausea And Vomiting     Extreme vomiting, can't keep food or water down either     Codeine Other (See Comments)    Sulfa Antibiotics Other (See Comments)    Povidone Iodine Rash       Current Outpatient Medications on File Prior to Visit   Medication Sig Dispense Refill    Accu-Chek Softclix Lancets lancets Use as instructed to check blood sugar before meals and at bedtime 100 each 12    albuterol sulfate  (90 Base) MCG/ACT inhaler Inhale 2 puffs every 6 hours as needed for wheezing and shortness of breath 18 g 1    Blood Glucose Monitoring Suppl (Accu-Chek Guide) w/Device kit 1 each 4 (Four) Times a Day After Meals & at Bedtime. 1 kit 0    cetirizine (Allergy, Cetirizine,) 10 MG tablet Take 1 tablet by mouth Daily. 30 tablet 3    Continuous Blood Gluc Sensor (Dexcom G6 Sensor) Use to check blood sugar as directed. Replace sensor once every 10 days. 3 each 6    Continuous Blood Gluc Transmit (Dexcom G6 Transmitter) misc Use to test blood sugar as directed. Replace transmitter every 90 days 1 each 2    ferrous sulfate 324 (65 Fe) MG tablet delayed-release EC tablet Take 1 tablet by mouth Daily With Breakfast.      furosemide (LASIX) 20 MG tablet Take 1 tablet by mouth Daily. 30 tablet 3    gabapentin (NEURONTIN) 300 MG capsule Take 1 capsule by mouth 3 (Three) Times a Day. 90 capsule 3    glucose blood (Accu-Chek Guide) test strip Use as instructed to test blood sugar 4 times daily 200 each 5    glucose blood (Accu-Chek Guide) test strip Use as instructed 200 each 5    Insulin Syringe 28G X 1/2\" 0.5 ML misc 1 each 3 (Three) Times a Day. 100 each 3    Jardiance " "25 MG tablet tablet TAKE 1 TABLET BY MOUTH ONCE DAILY IN THE MORNING 90 tablet 0    Lantus SoloStar 100 UNIT/ML injection pen INJECT 23 UNITS ONCE DAILY AT BEDTIME 21 mL 0    levoFLOXacin (LEVAQUIN) 250 MG tablet Take 1 tablet by mouth every day for 5 days 5 tablet 0    levothyroxine (Synthroid) 175 MCG tablet Take 1 tablet by mouth Daily. 30 tablet 6    Levothyroxine Sodium 175 MCG capsule Take 175 mcg by mouth Daily. 30 capsule 6    Myrbetriq 25 MG tablet sustained-release 24 hour 24 hr tablet Take 1 tablet by mouth Daily. 30 tablet 6    Omega-3 Fatty Acids (fish oil) 1000 MG capsule capsule Take  by mouth.      pravastatin (PRAVACHOL) 10 MG tablet Take 1 tablet by mouth Daily. 30 tablet 5    pravastatin (PRAVACHOL) 10 MG tablet Take 1 tablet by mouth Daily. 30 tablet 5    RELION INSULIN SYR 0.5ML/31G 31G X 5/16\" 0.5 ML misc       Semaglutide,0.25 or 0.5MG/DOS, (Ozempic, 0.25 or 0.5 MG/DOSE,) 2 MG/3ML solution pen-injector Inject 0.25 mg under the skin as directed 1 (One) Time Per Week for 4 weeks, then increase to 0.5 mg weekly if able to tolerate. 3 mL 1    sodium bicarbonate 650 MG tablet Take 1 tablet by mouth 3 (Three) Times a Day.      sodium bicarbonate 650 MG tablet Take 1 tablet by mouth 3 (Three) Times a Day. 90 tablet 4    vitamin B-12 (CYANOCOBALAMIN) 100 MCG tablet Take 0.5 tablets by mouth Daily.      vitamin C (ASCORBIC ACID) 250 MG tablet Take 1 tablet by mouth Daily.      vitamin E 100 UNIT capsule Take 1 capsule by mouth Daily.      Insulin Lispro (humaLOG) 100 UNIT/ML injection Inject 1 Units under the skin into the appropriate area as directed 3 (Three) Times a Day Before Meals for 30 days. SS for bloodsugars >150 5 u, 151-200 7 u, 201-250 9 u, 251-300 11 u, 301-350 13 u, 351-400 15 u 10 mL 0     No current facility-administered medications on file prior to visit.       Objective   Resp 20   Ht 154.9 cm (61\")   Wt 111 kg (244 lb)   BMI 46.10 kg/mý     Foot/Ankle Exam    GENERAL  Diabetic " foot exam performed    Appearance:  appears stated age and obese  Orientation:  AAOx3  Affect:  appropriate  Assistance:  wheelchair  Left shoe gear: sock    VASCULAR     Right Foot Vascularity   Right DP grade: unable to palpate.  Skin temperature:  warm  Edema gradin+ and non-pitting  CFT:  < 3 seconds  Pedal hair growth:  Absent  Varicosities:  spider veins     NEUROLOGIC     Right Foot Neurologic   Light touch sensation: normal  Protective Sensation using [place]-Yao Monofilament:   Sites intact: 9  Sites tested: 10    MUSCULOSKELETAL     Right Foot Musculoskeletal   Ecchymosis:  none  Tenderness:  toenail problem    Arch:  Pes planus    MUSCLE STRENGTH     Right Foot Muscle Strength   Foot dorsiflexion:  5  Foot plantar flexion:  5    DERMATOLOGIC      Right Foot Dermatologic   Skin  Positive for dryness and skin changes.   Nails  1.  Positive for elongated, onychomycosis, abnormal thickness and dystrophic nail.  2.  Positive for elongated, onychomycosis, abnormal thickness and dystrophic nail.  3.  Positive for elongated, onychomycosis, abnormal thickness and dystrophic nail.  4.  Positive for elongated, onychomycosis, abnormal thickness and dystrophic nail.  5.  Positive for elongated, onychomycosis, abnormal thickness and dystrophic nail.    Assessment & Plan   Diagnoses and all orders for this visit:    1. DM type 2 with diabetic peripheral neuropathy (Primary)    2. Xerosis of skin    3. Lymphedema    4. History of left above knee amputation    5. Onychomycosis    6. Decreased pedal pulses      The patient presents to the office today for routine diabetic foot examination. Do feel patient is at moderate risk for pedal complications related to diabetes mellitus type 2 as she has a history of amputation, mildly decreased protective sensation, and her blood glucose is elevated. Would like the patient to obtain SY testing secondary to difficulty palpating her pedal pulses. Advised patient to  moisturize her feet daily with a urea-based moisturizer as she does have xerosis bilaterally. Explained importance of diabetic foot care, daily foot checks, and glycemic control. Patient should check both feet on a daily basis, monitor and control blood sugars, make sure that both feet and in between toes are towel dried after baths or showers. Avoid barefoot walking at all times. Check shoes before putting them on. Patient was given information on proper foot care. Call the office at the first signs of a wound or with signs of infection. Would like the patient to return for routine diabetic foot examination in 3 months. Will discuss her SY test results at that time.     Nail debridement: Both feet x10    Consent and time out was performed before proceeding with the procedure. Nails were debrided with a nail nipper without complication.  No anesthesia was required.  Indications for procedure were thickened, dystrophic, and fungal appearing nails which are difficult to trim.  Proper self-care and technique was discussed with patient.  Patient was stable after procedure.    No orders of the defined types were placed in this encounter.         Transcribed from ambient dictation for MARRY Paula by Gloria Jones.  08/15/23   11:21 EDT    Patient or patient representative verbalized consent to the visit recording.  I have personally performed the services described in this document as transcribed by the above individual, and it is both accurate and complete.  MARRY Paula  8/15/2023  13:09 EDT

## 2023-08-16 ENCOUNTER — OFFICE VISIT (OUTPATIENT)
Dept: ENDOCRINOLOGY | Facility: CLINIC | Age: 58
End: 2023-08-16
Payer: MEDICAID

## 2023-08-16 ENCOUNTER — SPECIALTY PHARMACY (OUTPATIENT)
Dept: ENDOCRINOLOGY | Facility: CLINIC | Age: 58
End: 2023-08-16
Payer: MEDICAID

## 2023-08-16 VITALS
OXYGEN SATURATION: 96 % | BODY MASS INDEX: 45.88 KG/M2 | WEIGHT: 243 LBS | DIASTOLIC BLOOD PRESSURE: 70 MMHG | HEIGHT: 61 IN | HEART RATE: 96 BPM | SYSTOLIC BLOOD PRESSURE: 130 MMHG

## 2023-08-16 DIAGNOSIS — E06.3 HYPOTHYROIDISM DUE TO HASHIMOTO'S THYROIDITIS: Primary | ICD-10-CM

## 2023-08-16 DIAGNOSIS — E78.49 OTHER HYPERLIPIDEMIA: ICD-10-CM

## 2023-08-16 DIAGNOSIS — E11.42 DIABETIC PERIPHERAL NEUROPATHY: ICD-10-CM

## 2023-08-16 DIAGNOSIS — E11.69 TYPE 2 DIABETES MELLITUS WITH OTHER SPECIFIED COMPLICATION, WITH LONG-TERM CURRENT USE OF INSULIN: ICD-10-CM

## 2023-08-16 DIAGNOSIS — Z79.4 TYPE 2 DIABETES MELLITUS WITH HYPERGLYCEMIA, WITH LONG-TERM CURRENT USE OF INSULIN: ICD-10-CM

## 2023-08-16 DIAGNOSIS — Z79.4 TYPE 2 DIABETES MELLITUS WITH OTHER SPECIFIED COMPLICATION, WITH LONG-TERM CURRENT USE OF INSULIN: ICD-10-CM

## 2023-08-16 DIAGNOSIS — E66.01 MORBID OBESITY: ICD-10-CM

## 2023-08-16 DIAGNOSIS — E11.65 TYPE 2 DIABETES MELLITUS WITH HYPERGLYCEMIA, WITH LONG-TERM CURRENT USE OF INSULIN: ICD-10-CM

## 2023-08-16 DIAGNOSIS — E03.8 HYPOTHYROIDISM DUE TO HASHIMOTO'S THYROIDITIS: Primary | ICD-10-CM

## 2023-08-16 DIAGNOSIS — N18.32 STAGE 3B CHRONIC KIDNEY DISEASE: ICD-10-CM

## 2023-08-16 DIAGNOSIS — E03.9 HYPOTHYROIDISM, UNSPECIFIED TYPE: ICD-10-CM

## 2023-08-16 RX ORDER — INSULIN GLARGINE 100 [IU]/ML
23 INJECTION, SOLUTION SUBCUTANEOUS NIGHTLY
Qty: 15 ML | Refills: 5 | Status: SHIPPED | OUTPATIENT
Start: 2023-08-16

## 2023-08-16 RX ORDER — LEVOTHYROXINE SODIUM 175 UG/1
175 CAPSULE ORAL DAILY
Qty: 35 CAPSULE | Refills: 6 | Status: SHIPPED | OUTPATIENT
Start: 2023-08-16

## 2023-08-16 RX ORDER — INSULIN LISPRO 100 [IU]/ML
5 INJECTION, SOLUTION INTRAVENOUS; SUBCUTANEOUS
Qty: 15 ML | Refills: 5 | Status: SHIPPED | OUTPATIENT
Start: 2023-08-16

## 2023-08-16 RX ORDER — PROCHLORPERAZINE 25 MG/1
1 SUPPOSITORY RECTAL
Qty: 1 EACH | Refills: 0 | Status: SHIPPED | OUTPATIENT
Start: 2023-08-16

## 2023-08-16 RX ORDER — SEMAGLUTIDE 0.68 MG/ML
0.5 INJECTION, SOLUTION SUBCUTANEOUS WEEKLY
Qty: 3 ML | Refills: 5 | Status: SHIPPED | OUTPATIENT
Start: 2023-08-16

## 2023-08-16 NOTE — PROGRESS NOTES
-----------------------------------------------------------------  ENDOCRINE CLINIC NOTE  -----------------------------------------------------------------        PATIENT NAME: Marilyn Martins  PATIENT : 1965 AGE: 58 y.o.  MRN NUMBER: [id number]  PRIMARY CARE: Sondra Thomas APRN    ==========================================================================    CHIEF COMPLAINT: Type 2 Diabetes Mellitus and Hypothyrodism  DATE OF SERVICE: 2023         ==========================================================================    HPI / SUBJECTIVE    58 y.o. female is seen in the clinic today for follow-up of type 2 Diabetes and Hypothyroidism.    T2DM:  - Diagnosed with type 2 diabetes around   - A1c of 8.1% on 2023  - Current therapy:  Jardiance 25 mg p.o. daily  Insulin Basaglar 23 units nightly  Insulin lispro 5 units with each meal  Ozempic 0.25 mg once a week  - Patient have underlying history of CKD status post right nephrectomy in 2002  -Current blood sugars through Dexcom  - Following in ophthalmology in the clinic, next visit was 2023, had any diabetic changes in the eyes per patient.  - Reports to have mild neuropathy in legs.  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     Hypothyroidism:  - Diagnosed with hypothyroidism in   - Current dose is 175 mcg   - Last lab work on 8/10/2023 which showed significantly elevated TSH with low normal free T4    ==========================================================================                                                PAST MEDICAL HISTORY    Past Medical History:   Diagnosis Date    Allergic     Anemia     Anxiety     Arthritis     Asthma     Callus     Cataract     CHF (congestive heart failure)     Cholelithiasis     Gall bladder removed    Chronic constipation     Chronic diarrhea     COPD (chronic obstructive pulmonary disease)      Depression     Diabetes mellitus     Difficulty walking     Unable to walk due to left leg amputated    Diverticulosis 2022    GERD (gastroesophageal reflux disease) 2005    Headache     Heart murmur 2007    Sometimes heard    History of transfusion     Last done on Dec 8th 2022 for right kidney removel    HL (hearing loss) 2012    Left ear drum is busted, hole in right ear, get excess of build up in both ears    Hyperlipidemia 2000    Hypothyroidism 1986    Uncontroled    Kidney stone 2022    Left side two removed by lazer surgery,right kidney removed Dec.8th of this year.    Low back pain 2001    Neuropathy in diabetes     Obesity 1989    Pneumonia 2022 March of this year    Renal insufficiency 2016    Shortness of breath     Sinusitis     Substance abuse     Urinary tract infection     Visual impairment 1985       ==========================================================================    PAST SURGICAL HISTORY    Past Surgical History:   Procedure Laterality Date    ABOVE KNEE AMPUTATION Left 02/2000    ANKLE OPEN REDUCTION INTERNAL FIXATION      Before on left ankle    CHOLECYSTECTOMY  2002    COLONOSCOPY  2004    EYE SURGERY  2014    Carrects    FOOT SURGERY      Had broken left ankle before amputation    HERNIA REPAIR      HYSTERECTOMY      JOINT REPLACEMENT      SUBTOTAL HYSTERECTOMY      TOENAIL EXCISION      Right big toe nail       ==========================================================================    FAMILY HISTORY    Family History   Problem Relation Age of Onset    Anxiety disorder Daughter     Developmental Disability Daughter     Diabetes Daughter         Pre diabetic with first child    Learning disabilities Daughter     Mental illness Daughter         Cutter, anything to harm herself, hanging, burning etc.    Anxiety disorder Daughter     Developmental Disability Daughter     Diabetes Daughter     Learning disabilities Daughter     Mental illness Daughter         Cutters    Developmental  Disability Son     Learning disabilities Son     Diabetes Mother        ==========================================================================    SOCIAL HISTORY    Social History     Socioeconomic History    Marital status:     Number of children: 3    Years of education: 12   Tobacco Use    Smoking status: Never    Smokeless tobacco: Never   Vaping Use    Vaping Use: Never used   Substance and Sexual Activity    Alcohol use: Not Currently     Comment: Wine coolers and sleeping pills together 1996    Drug use: Never    Sexual activity: Not Currently     Partners: Male     Birth control/protection: Hysterectomy       ==========================================================================    MEDICATIONS      Current Outpatient Medications:     Accu-Chek Softclix Lancets lancets, Use as instructed to check blood sugar before meals and at bedtime, Disp: 100 each, Rfl: 12    albuterol sulfate  (90 Base) MCG/ACT inhaler, Inhale 2 puffs every 6 hours as needed for wheezing and shortness of breath, Disp: 18 g, Rfl: 1    Blood Glucose Monitoring Suppl (Accu-Chek Guide) w/Device kit, 1 each 4 (Four) Times a Day After Meals & at Bedtime., Disp: 1 kit, Rfl: 0    cetirizine (Allergy, Cetirizine,) 10 MG tablet, Take 1 tablet by mouth Daily., Disp: 30 tablet, Rfl: 3    Continuous Blood Gluc Sensor (Dexcom G6 Sensor), Use to check blood sugar as directed. Replace sensor once every 10 days., Disp: 3 each, Rfl: 6    Continuous Blood Gluc Transmit (Dexcom G6 Transmitter) misc, Use to test blood sugar as directed. Replace transmitter every 90 days, Disp: 1 each, Rfl: 2    ferrous sulfate 324 (65 Fe) MG tablet delayed-release EC tablet, Take 1 tablet by mouth Daily With Breakfast., Disp: , Rfl:     furosemide (LASIX) 20 MG tablet, Take 1 tablet by mouth Daily., Disp: 30 tablet, Rfl: 3    gabapentin (NEURONTIN) 300 MG capsule, Take 1 capsule by mouth 3 (Three) Times a Day., Disp: 90 capsule, Rfl: 3    glucose blood  "(Accu-Chek Guide) test strip, Use as instructed to test blood sugar 4 times daily, Disp: 200 each, Rfl: 5    glucose blood (Accu-Chek Guide) test strip, Use as instructed, Disp: 200 each, Rfl: 5    Insulin Syringe 28G X 1/2\" 0.5 ML misc, 1 each 3 (Three) Times a Day., Disp: 100 each, Rfl: 3    Jardiance 25 MG tablet tablet, TAKE 1 TABLET BY MOUTH ONCE DAILY IN THE MORNING, Disp: 90 tablet, Rfl: 0    levoFLOXacin (LEVAQUIN) 250 MG tablet, Take 1 tablet by mouth every day for 5 days, Disp: 5 tablet, Rfl: 0    levothyroxine (Synthroid) 175 MCG tablet, Take 1 tablet by mouth Daily., Disp: 30 tablet, Rfl: 6    Levothyroxine Sodium 175 MCG capsule, Take 175 mcg by mouth Daily. Take 175 mcg by mouth daily and one additional pill a week, Disp: 35 capsule, Rfl: 6    Myrbetriq 25 MG tablet sustained-release 24 hour 24 hr tablet, Take 1 tablet by mouth Daily., Disp: 30 tablet, Rfl: 6    Omega-3 Fatty Acids (fish oil) 1000 MG capsule capsule, Take  by mouth., Disp: , Rfl:     pravastatin (PRAVACHOL) 10 MG tablet, Take 1 tablet by mouth Daily., Disp: 30 tablet, Rfl: 5    RELION INSULIN SYR 0.5ML/31G 31G X 5/16\" 0.5 ML misc, , Disp: , Rfl:     Semaglutide,0.25 or 0.5MG/DOS, (Ozempic, 0.25 or 0.5 MG/DOSE,) 2 MG/3ML solution pen-injector, Inject 0.5 mg under the skin into the appropriate area as directed 1 (One) Time Per Week., Disp: 3 mL, Rfl: 5    sodium bicarbonate 650 MG tablet, Take 1 tablet by mouth 3 (Three) Times a Day., Disp: 90 tablet, Rfl: 4    vitamin B-12 (CYANOCOBALAMIN) 100 MCG tablet, Take 0.5 tablets by mouth Daily., Disp: , Rfl:     vitamin C (ASCORBIC ACID) 250 MG tablet, Take 1 tablet by mouth Daily., Disp: , Rfl:     vitamin E 100 UNIT capsule, Take 1 capsule by mouth Daily., Disp: , Rfl:     Continuous Blood Gluc  (Dexcom G6 ) device, 1 each 4 (Four) Times a Day Before Meals & at Bedtime., Disp: 1 each, Rfl: 0    Insulin Glargine (BASAGLAR KWIKPEN) 100 UNIT/ML injection pen, Inject 23 Units " under the skin into the appropriate area as directed Every Night., Disp: 15 mL, Rfl: 5    Insulin Lispro (ADMELOG SOLOSTAR) 100 UNIT/ML injection pen, Inject 5 Units under the skin into the appropriate area as directed 3 (Three) Times a Day With Meals., Disp: 15 mL, Rfl: 5    Insulin Pen Needle 32G X 4 MM misc, 1 each 4 (Four) Times a Day Before Meals & at Bedtime., Disp: 200 each, Rfl: 5    ==========================================================================    ALLERGIES    Allergies   Allergen Reactions    Latex Other (See Comments)    Morphine And Related Rash    Cephalexin Diarrhea and Nausea And Vomiting     Extreme vomiting, can't keep food or water down either     Codeine Other (See Comments)    Sulfa Antibiotics Other (See Comments)    Povidone Iodine Rash       ==========================================================================    OBJECTIVE    Vitals:    08/16/23 0848   BP: 130/70   Pulse: 96   SpO2: 96%     Body mass index is 45.91 kg/mý.     General: Alert, cooperative, no acute distress  Thyroid:  no enlargement/tenderness/palpable nodules  Lungs: Clear to auscultation bilaterally, respirations unlabored  Heart: Regular rate and rhythm, S1 and S2 normal, no murmur, rub or gallop  Abdomen: Soft, NT, ND and Bowel sounds Positive    ==========================================================================    LAB EVALUATION    Lab Results   Component Value Date    GLUCOSE 87 04/10/2023    BUN 33 (H) 04/10/2023    CREATININE 1.64 (H) 04/10/2023    BCR 20.1 04/10/2023    K 4.0 04/10/2023    CO2 22.8 04/10/2023    CALCIUM 9.6 04/10/2023    ALBUMIN 3.4 (L) 04/10/2023    AST 15 04/10/2023    ALT 11 04/10/2023    CHOL 166 07/11/2023    TRIG 258 (H) 07/11/2023    HDL 28 (L) 07/11/2023    LDL 94 07/11/2023     Lab Results   Component Value Date    HGBA1C 8.10 (H) 07/11/2023    HGBA1C 7.20 (H) 04/10/2023    HGBA1C 6.2 (H) 01/11/2023     Lab Results   Component Value Date    MICROALBUR 2.3 07/11/2023     "CREATININE 1.64 (H) 04/10/2023     Lab Results   Component Value Date    TSH 33.800 (H) 08/10/2023    FREET4 0.88 08/10/2023     CGM Data:    Dates: August 3 till August 16, 2023    Very High > 250: 0%  High 180 - 250: 25%  Target: 70 - 180: 75%  Low 55 - 70:  0%  Very Low < 55: 0%      ==========================================================================    ASSESSMENT AND PLAN    Assessment:  #Type 2 diabetes, uncontrolled, on long-term insulin therapy with hyperglycemia  #Hypothyroidism  #Hyperlipidemia  #Morbid obesity  #Peripheral neuropathy secondary to diabetes type 2  #CKD stage III with microalbuminuria    Plan:  - Reviewed blood glucose, still persistent hyperglycemia  - Adjusted therapy as:  Jardiance 25 mg p.o. daily  Insulin Basaglar 23 units nightly  Insulin lispro 5 units with each meal  Increase Ozempic to 0.5 mg once a week  - Continue Dexcom monitoring follow-up in my clinic in 2 months time  - Regarding hypothyroidism patient reports that she ends up having hyperthyroidism with 200 mcg dosing  - Currently maintained on levothyroxine 175 mcg p.o. daily, increased to 1 additional pill of 175 a week  - Repeat thyroid function in 2 months time  - Continue pravastatin therapy for high cholesterol      Thank you for courtesy of consultation.    Return to clinic: 2 months    Entire assessment and plan was discussed and counseled the patient in detail to which patient verbalized understanding and agreed with care.  Answered all queries and concerns.    This note was created using voice recognition software and is inherently subject to errors including those of syntax and \"sound-alike\" substitutions which may escape proofreading.  In such instances, original meaning may be extrapolated by contextual derivation.    Note: Portions of this note may have been copied from previous notes but documentation have been reviewed and edited as necessary to support clinical decision making for today's " visit.    ==========================================================================    INFORMATION PROVIDED TO PATIENT    Patient Instructions   Please,     - Continue Jardiance therapy with no changes.  - Continue nighttime insulin with Basaglar 23 units every night.  - Use mealtime insulin 5 units with each meal with no correction scale.  Mealtime insulin needs to be taking 15 minutes before each meal.  Please check your blood sugar before giving mealtime insulin.  - Increase Ozempic 0.5 mg once a week.     Continue to check your blood sugar via Dexcom.  I have ordered Dexcom monitor for you.  If there is any problem with your Dexcom you can continue to check your blood sugar with fingersticks, before each meal and before bedtime.  In the logs if you check it through fingersticks.     If your blood sugar is running less than 100 and fasting then decrease your nighttime insulin by 3 units.  If your blood sugar before lunch, before dinner and before bedtime snack is less than 100 then decrease your mealtime insulin by 2 units.     Continue pravastatin 10 mg 1 pill by mouth daily (preferably at bedtime).     For levothyroxine continue 175 mcg 1 pill by mouth early in the morning at fasting state and maintain fasting for 40 minutes at least.  Take an additional pill of 175 mcg once a week, same day of every week.     Repeat blood work before next visit.    Follow-up in my clinic in 2 months time.  Thank you for your visit today.     If you have any questions or concerns please feel free to reach out of the office.      ==========================================================================  Ray Narvaez MD  Department of Endocrine, Diabetes and Metabolism  [place] [place]  ==========================================================================

## 2023-08-16 NOTE — PATIENT INSTRUCTIONS
Please,     - Continue Jardiance therapy with no changes.  - Continue nighttime insulin with Basaglar 23 units every night.  - Use mealtime insulin 5 units with each meal with no correction scale.  Mealtime insulin needs to be taking 15 minutes before each meal.  Please check your blood sugar before giving mealtime insulin.  - Increase Ozempic 0.5 mg once a week.     Continue to check your blood sugar via Dexcom.  I have ordered Dexcom monitor for you.  If there is any problem with your Dexcom you can continue to check your blood sugar with fingersticks, before each meal and before bedtime.  In the logs if you check it through fingersticks.     If your blood sugar is running less than 100 and fasting then decrease your nighttime insulin by 3 units.  If your blood sugar before lunch, before dinner and before bedtime snack is less than 100 then decrease your mealtime insulin by 2 units.     Continue pravastatin 10 mg 1 pill by mouth daily (preferably at bedtime).     For levothyroxine continue 175 mcg 1 pill by mouth early in the morning at fasting state and maintain fasting for 40 minutes at least.  Take an additional pill of 175 mcg once a week, same day of every week.     Repeat blood work before next visit.    Follow-up in my clinic in 2 months time.  Thank you for your visit today.     If you have any questions or concerns please feel free to reach out of the office.

## 2023-08-16 NOTE — PROGRESS NOTES
" Specialty Pharmacy Patient Management Program  Endocrinology Refill Outreach      Marilyn \"Delphine\" is a 58 y.o. female contacted today regarding refills of her medication(s).    Specialty medication(s) and dose(s) confirmed: dexcom sensors, dexcom , and humalog  Other medication(s) being refilled: pravastatin, myrbetriq, pen needles    Refill Questions      Flowsheet Row Most Recent Value   Changes to allergies? No   Changes to medications? No   New conditions since last clinic visit No   Unplanned office visit, urgent care, ED, or hospital admission in the last 4 weeks  No   How does patient/caregiver feel medication is working? Very good   Financial problems or insurance changes  No   Since the previous refill, were any specialty medication doses or scheduled injections missed or delayed?  No   Does this patient require a clinical escalation to a pharmacist? No          Delivery Questions      Flowsheet Row Most Recent Value   Delivery method  at Pharmacy   Is there any medication that is due not being filled? No   Supplies needed? No supplies needed   Copay form of payment Pay at pickup   Questions or concerns for the pharmacist? No   Are any medications first time fills? No   Shipment status Delivery complete                Follow-Up: 30 days    Mattie Chen, Yunior  Specialty Clinical Pharmacist      "

## 2023-08-25 ENCOUNTER — SPECIALTY PHARMACY (OUTPATIENT)
Dept: ENDOCRINOLOGY | Facility: CLINIC | Age: 58
End: 2023-08-25
Payer: MEDICAID

## 2023-08-25 RX ORDER — CETIRIZINE HYDROCHLORIDE 10 MG/1
10 TABLET ORAL DAILY
Qty: 30 TABLET | Refills: 3 | Status: SHIPPED | OUTPATIENT
Start: 2023-08-25

## 2023-08-25 NOTE — PROGRESS NOTES
"Specialty Pharmacy Refill Coordination Note     Marilyn \"Delphine\" is a 58 y.o. female contacted today regarding refills of her specialty medication(s).    Specialty medication(s) and dose(s) confirmed: yes  Changes to medications: no  Changes to insurance: no  Reviewed and verified with patient:         Refill Questions      Flowsheet Row Most Recent Value   Changes to allergies? No   Changes to medications? No   New conditions since last clinic visit No   Unplanned office visit, urgent care, ED, or hospital admission in the last 4 weeks  No   How does patient/caregiver feel medication is working? Good   Financial problems or insurance changes  No   Since the previous refill, were any specialty medication doses or scheduled injections missed or delayed?  No   Does this patient require a clinical escalation to a pharmacist? No            Delivery Questions      Flowsheet Row Most Recent Value   Delivery method  at Pharmacy   Number of medications in delivery 2   Medication being filled and delivered Lasix, Ozempic   Doses left of specialty medications 1 week   Is there any medication that is due not being filled? No   Supplies needed? No supplies needed   Cooler needed? Yes   Do any medications need mixed or dated? No   Additional comments 0.00   Questions or concerns for the pharmacist? No   Explain any questions or concerns for the pharmacist n/a   Are any medications first time fills? No                 Follow-up: 1 month(s)     Shirley Arellano, Pharmacy Technician  Specialty Pharmacy Technician   8/25/2023   12:13 EDT      "

## 2023-08-25 NOTE — PROGRESS NOTES
"Specialty Pharmacy Refill Coordination Note     Marilyn \"Delphine\" is a 58 y.o. female contacted today regarding refills of her specialty medication(s).    Specialty medication(s) and dose(s) confirmed: yes  Changes to medications: no  Changes to insurance: no  Reviewed and verified with patient:         Refill Questions      Flowsheet Row Most Recent Value   Changes to allergies? No   Changes to medications? No   New conditions since last clinic visit No   Unplanned office visit, urgent care, ED, or hospital admission in the last 4 weeks  No   How does patient/caregiver feel medication is working? Good   Financial problems or insurance changes  No   Since the previous refill, were any specialty medication doses or scheduled injections missed or delayed?  No   Does this patient require a clinical escalation to a pharmacist? No            Delivery Questions      Flowsheet Row Most Recent Value   Delivery method  at Pharmacy   Number of medications in delivery 1   Medication being filled and delivered Cetirizine   Is there any medication that is due not being filled? No   Supplies needed? No supplies needed   Cooler needed? No   Do any medications need mixed or dated? No   Additional comments 0.00   Questions or concerns for the pharmacist? No   Explain any questions or concerns for the pharmacist n/a                 Follow-up: 1 month(s)     Shirley Arellano, Pharmacy Technician  Specialty Pharmacy Technician   8/25/2023   12:18 EDT      "

## 2023-08-30 ENCOUNTER — HOSPITAL ENCOUNTER (OUTPATIENT)
Dept: CARDIOLOGY | Facility: HOSPITAL | Age: 58
Discharge: HOME | End: 2023-08-30
Payer: MEDICAID

## 2023-08-30 DIAGNOSIS — R09.89 DECREASED PEDAL PULSES: ICD-10-CM

## 2023-08-30 DIAGNOSIS — E11.42 DM TYPE 2 WITH DIABETIC PERIPHERAL NEUROPATHY: ICD-10-CM

## 2023-08-30 LAB
BH CV LOWER ARTERIAL RIGHT ABI RATIO: 1.36
BH CV LOWER ARTERIAL RIGHT DORSALIS PEDIS SYS MAX: 135
BH CV LOWER ARTERIAL RIGHT GREAT TOE SYS MAX: 116
BH CV LOWER ARTERIAL RIGHT POST TIBIAL SYS MAX: 165
BH CV LOWER ARTERIAL RIGHT TBI RATIO: 0.96
UPPER ARTERIAL LEFT ARM BRACHIAL SYS MAX: 121 MMHG
UPPER ARTERIAL RIGHT ARM BRACHIAL SYS MAX: 113 MMHG

## 2023-08-30 PROCEDURE — 93922 UPR/L XTREMITY ART 2 LEVELS: CPT

## 2023-09-01 ENCOUNTER — TELEPHONE (OUTPATIENT)
Dept: FAMILY MEDICINE CLINIC | Facility: CLINIC | Age: 58
End: 2023-09-01
Payer: MEDICAID

## 2023-09-01 NOTE — TELEPHONE ENCOUNTER
PT BROKE WHEEL CHAIR - NEEDS RX FOR NEW ELECTRIC WHEEL CHAIR     SEND TO [facility] ATTN:TRELL FAX: [phone]

## 2023-09-06 NOTE — PROGRESS NOTES
Please call patient and let her know Right LE SY is normal. This means normal blood flow to her RLE. Thank you.

## 2023-09-07 ENCOUNTER — TELEPHONE (OUTPATIENT)
Dept: PODIATRY | Facility: CLINIC | Age: 58
End: 2023-09-07
Payer: MEDICAID

## 2023-09-07 ENCOUNTER — OFFICE VISIT (OUTPATIENT)
Dept: ENDOCRINOLOGY | Facility: CLINIC | Age: 58
End: 2023-09-07
Payer: MEDICAID

## 2023-09-07 ENCOUNTER — SPECIALTY PHARMACY (OUTPATIENT)
Dept: ENDOCRINOLOGY | Facility: CLINIC | Age: 58
End: 2023-09-07
Payer: MEDICAID

## 2023-09-07 DIAGNOSIS — E11.42 DIABETIC PERIPHERAL NEUROPATHY: ICD-10-CM

## 2023-09-07 DIAGNOSIS — Z79.4 TYPE 2 DIABETES MELLITUS WITH OTHER SPECIFIED COMPLICATION, WITH LONG-TERM CURRENT USE OF INSULIN: ICD-10-CM

## 2023-09-07 DIAGNOSIS — E11.69 TYPE 2 DIABETES MELLITUS WITH OTHER SPECIFIED COMPLICATION, WITH LONG-TERM CURRENT USE OF INSULIN: ICD-10-CM

## 2023-09-07 DIAGNOSIS — E11.65 TYPE 2 DIABETES MELLITUS WITH HYPERGLYCEMIA, WITH LONG-TERM CURRENT USE OF INSULIN: ICD-10-CM

## 2023-09-07 DIAGNOSIS — Z79.4 TYPE 2 DIABETES MELLITUS WITH HYPERGLYCEMIA, WITH LONG-TERM CURRENT USE OF INSULIN: ICD-10-CM

## 2023-09-07 DIAGNOSIS — E11.65 TYPE 2 DIABETES MELLITUS WITH HYPERGLYCEMIA, WITH LONG-TERM CURRENT USE OF INSULIN: Primary | ICD-10-CM

## 2023-09-07 DIAGNOSIS — Z79.4 TYPE 2 DIABETES MELLITUS WITH HYPERGLYCEMIA, WITH LONG-TERM CURRENT USE OF INSULIN: Primary | ICD-10-CM

## 2023-09-07 PROCEDURE — G0108 DIAB MANAGE TRN  PER INDIV: HCPCS

## 2023-09-07 RX ORDER — SEMAGLUTIDE 0.68 MG/ML
0.25 INJECTION, SOLUTION SUBCUTANEOUS WEEKLY
Qty: 3 ML | Refills: 5
Start: 2023-09-07

## 2023-09-07 RX ORDER — EMPAGLIFLOZIN 25 MG/1
25 TABLET, FILM COATED ORAL EVERY MORNING
Qty: 90 TABLET | Refills: 0 | Status: SHIPPED | OUTPATIENT
Start: 2023-09-07 | End: 2023-09-07 | Stop reason: SDUPTHER

## 2023-09-07 RX ORDER — INSULIN LISPRO 100 [IU]/ML
8 INJECTION, SOLUTION INTRAVENOUS; SUBCUTANEOUS
Qty: 15 ML | Refills: 5 | Status: SHIPPED | OUTPATIENT
Start: 2023-09-07

## 2023-09-07 RX ORDER — EMPAGLIFLOZIN 25 MG/1
25 TABLET, FILM COATED ORAL EVERY MORNING
Qty: 90 TABLET | Refills: 0 | Status: SHIPPED | OUTPATIENT
Start: 2023-09-07

## 2023-09-07 NOTE — TELEPHONE ENCOUNTER
----- Message from MARRY Paula sent at 9/6/2023  5:18 PM EDT -----  Please call patient and let her know Right LE SY is normal. This means normal blood flow to her RLE. Thank you.

## 2023-09-07 NOTE — PROGRESS NOTES
Pt and cargiver/sister Karla here today for 1 hour and 10 minutes from 9:00 AM from 10:10 AM for DSMES. Pt diagnosed with type 2 diabetes around 2003/04. A1c of 8.1% on 7/11/2023. Reviewed Dexcom clarity report, pt target range 81% of the time, but is having consistent PP highs. Pt denies any incidence of low BG. Pt has been having trouble keeping Dexcom on, placing near waistband of pants. Suggested pt place above waistband or try arm with free overpatches from Dexcom. Gave Dexcom number for replacemet [phone]. Pt currently taking 5 units of Lispro 15 min before meals and 23 units Basaglar at bedtime. Also taking Jardiance 25 mg q d--which she needs refilled, and 0.5 mg Ozempic q wk. Pt states that she has been having n/v since Ozempic dose was increased. Pt with hx CKD 3--followed by Dr Mccauley. Pt's sister administers meds--she was able to demo insulin injection in clinic today. Counseled on one plate method, label reading, carb counting (30-45g carb per meal, 15g with snacks), and treating hyper/hypoglyemia. Pt states that she dislikes exercise, wheelchair bound with BKA. Discussed possibility of chair exercises with pt. Pt to increase Lispro to 8 units with meals and decrease Ozempic to 0.25 mg q wk to see if n/v improves. Scheduled pt for class one on 10/2/23 @ 8 AM. RD to send rx to refill Jardiance.

## 2023-09-07 NOTE — PROGRESS NOTES
"Specialty Pharmacy Refill Coordination Note     Marilyn \"Delphine\" is a 58 y.o. female contacted today regarding refills of her specialty medication(s).    Specialty medication(s) and dose(s) confirmed: yes  Changes to medications: no  Changes to insurance: no  Reviewed and verified with patient:         Refill Questions      Flowsheet Row Most Recent Value   Changes to allergies? No   Changes to medications? No   New conditions since last clinic visit No   Unplanned office visit, urgent care, ED, or hospital admission in the last 4 weeks  No   How does patient/caregiver feel medication is working? Good   Financial problems or insurance changes  No   Since the previous refill, were any specialty medication doses or scheduled injections missed or delayed?  No   Does this patient require a clinical escalation to a pharmacist? No            Delivery Questions      Flowsheet Row Most Recent Value   Delivery method  at Pharmacy   Number of medications in delivery 2   Medication being filled and delivered strips, jardiance   Is there any medication that is due not being filled? No   Supplies needed? No supplies needed   Additional comments 0.00   Questions or concerns for the pharmacist? No                 Follow-up: 3 month(s)     Shirley Arellano, Pharmacy Technician  Specialty Pharmacy Technician   9/7/2023   10:37 EDT      "

## 2023-09-19 ENCOUNTER — SPECIALTY PHARMACY (OUTPATIENT)
Dept: ENDOCRINOLOGY | Facility: CLINIC | Age: 58
End: 2023-09-19
Payer: MEDICAID

## 2023-09-19 NOTE — PROGRESS NOTES
"Specialty Pharmacy Refill Coordination Note     Marilyn \"Delphine\" is a 58 y.o. female contacted today regarding refills of her specialty medication(s).    Specialty medication(s) and dose(s) confirmed: yes  Changes to medications: no  Changes to insurance: no  Reviewed and verified with patient:         Refill Questions      Flowsheet Row Most Recent Value   Changes to allergies? No   Changes to medications? No   New conditions since last clinic visit No   Unplanned office visit, urgent care, ED, or hospital admission in the last 4 weeks  No   How does patient/caregiver feel medication is working? Good   Financial problems or insurance changes  No   Since the previous refill, were any specialty medication doses or scheduled injections missed or delayed?  No   Does this patient require a clinical escalation to a pharmacist? No            Delivery Questions      Flowsheet Row Most Recent Value   Delivery method  at Pharmacy   Number of medications in delivery 8   Medication being filled and delivered Allergy relief, furosemide, gabapentin, levothyroxine, myrbetriq, ozempic, pravastatin, sodium bicarb   Doses left of specialty medications 1 week   Is there any medication that is due not being filled? No   Supplies needed? No supplies needed   Cooler needed? No   Do any medications need mixed or dated? No   Additional comments 0.00   Questions or concerns for the pharmacist? No   Explain any questions or concerns for the pharmacist n/a                 Follow-up: 1 month(s)     Shirley Arellano, Pharmacy Technician  Specialty Pharmacy Technician   9/19/2023   10:58 EDT      "

## 2023-09-28 LAB
ALBUMIN SERPL-MCNC: 3.5 G/DL (ref 3.8–4.9)
ALBUMIN/GLOB SERPL: 0.9 {RATIO} (ref 1.2–2.2)
ALP SERPL-CCNC: 105 IU/L (ref 44–121)
ALT SERPL-CCNC: 17 IU/L (ref 0–32)
AMBIG ABBREV CMP14 DEFAULT: NORMAL
AST SERPL-CCNC: 18 IU/L (ref 0–40)
BILIRUB SERPL-MCNC: <0.2 MG/DL (ref 0–1.2)
BUN SERPL-MCNC: 40 MG/DL (ref 8–27)
BUN/CREAT SERPL: 31 (ref 12–28)
CALCIUM SERPL-MCNC: 9.1 MG/DL (ref 8.7–10.3)
CHLORIDE SERPL-SCNC: 100 MMOL/L (ref 96–106)
CO2 SERPL-SCNC: 22 MMOL/L (ref 20–29)
CREAT SERPL-MCNC: 1.31 MG/DL (ref 0.57–1)
EGFRCR SERPLBLD CKD-EPI 2021: 47 ML/MIN/1.73
GLOBULIN SER CALC-MCNC: 3.9 G/DL (ref 1.5–4.5)
GLUCOSE SERPL-MCNC: 85 MG/DL (ref 70–99)
HBA1C MFR BLD: 7.2 % (ref 4.8–5.6)
POTASSIUM SERPL-SCNC: 4.7 MMOL/L (ref 3.5–5.2)
PROT SERPL-MCNC: 7.4 G/DL (ref 6–8.5)
SODIUM SERPL-SCNC: 138 MMOL/L (ref 134–144)
T4 FREE SERPL-MCNC: 1.95 NG/DL (ref 0.82–1.77)
TSH SERPL DL<=0.005 MIU/L-ACNC: 0.07 UIU/ML (ref 0.45–4.5)

## 2023-10-02 ENCOUNTER — SPECIALTY PHARMACY (OUTPATIENT)
Dept: ENDOCRINOLOGY | Facility: CLINIC | Age: 58
End: 2023-10-02
Payer: MEDICAID

## 2023-10-02 ENCOUNTER — OFFICE VISIT (OUTPATIENT)
Dept: ENDOCRINOLOGY | Facility: CLINIC | Age: 58
End: 2023-10-02
Payer: MEDICAID

## 2023-10-02 VITALS
HEIGHT: 61 IN | DIASTOLIC BLOOD PRESSURE: 84 MMHG | SYSTOLIC BLOOD PRESSURE: 144 MMHG | BODY MASS INDEX: 45.88 KG/M2 | WEIGHT: 243 LBS | OXYGEN SATURATION: 96 % | HEART RATE: 95 BPM

## 2023-10-02 DIAGNOSIS — Z79.4 TYPE 2 DIABETES MELLITUS WITH HYPERGLYCEMIA, WITH LONG-TERM CURRENT USE OF INSULIN: Primary | ICD-10-CM

## 2023-10-02 DIAGNOSIS — N18.32 STAGE 3B CHRONIC KIDNEY DISEASE: ICD-10-CM

## 2023-10-02 DIAGNOSIS — E06.3 HYPOTHYROIDISM DUE TO HASHIMOTO'S THYROIDITIS: ICD-10-CM

## 2023-10-02 DIAGNOSIS — E78.49 OTHER HYPERLIPIDEMIA: ICD-10-CM

## 2023-10-02 DIAGNOSIS — E11.65 TYPE 2 DIABETES MELLITUS WITH HYPERGLYCEMIA, WITH LONG-TERM CURRENT USE OF INSULIN: Primary | ICD-10-CM

## 2023-10-02 DIAGNOSIS — E66.01 MORBID OBESITY: ICD-10-CM

## 2023-10-02 DIAGNOSIS — E03.9 HYPOTHYROIDISM, UNSPECIFIED TYPE: ICD-10-CM

## 2023-10-02 DIAGNOSIS — Z79.4 TYPE 2 DIABETES MELLITUS WITH HYPERGLYCEMIA, WITH LONG-TERM CURRENT USE OF INSULIN: ICD-10-CM

## 2023-10-02 DIAGNOSIS — E11.42 DIABETIC PERIPHERAL NEUROPATHY: ICD-10-CM

## 2023-10-02 DIAGNOSIS — E03.8 HYPOTHYROIDISM DUE TO HASHIMOTO'S THYROIDITIS: ICD-10-CM

## 2023-10-02 DIAGNOSIS — E11.65 TYPE 2 DIABETES MELLITUS WITH HYPERGLYCEMIA, WITH LONG-TERM CURRENT USE OF INSULIN: ICD-10-CM

## 2023-10-02 PROCEDURE — 95251 CONT GLUC MNTR ANALYSIS I&R: CPT | Performed by: INTERNAL MEDICINE

## 2023-10-02 PROCEDURE — 99214 OFFICE O/P EST MOD 30 MIN: CPT | Performed by: INTERNAL MEDICINE

## 2023-10-02 PROCEDURE — G0109 DIAB MANAGE TRN IND/GROUP: HCPCS

## 2023-10-02 PROCEDURE — 3051F HG A1C>EQUAL 7.0%<8.0%: CPT | Performed by: INTERNAL MEDICINE

## 2023-10-02 RX ORDER — LANCETS
EACH MISCELLANEOUS
Qty: 100 EACH | Refills: 12 | Status: SHIPPED | OUTPATIENT
Start: 2023-10-02

## 2023-10-02 NOTE — PROGRESS NOTES
Pt here today for class 2 from 8:00 AM to 10:30 AM (2.5 hrs). Pt states that she is having difficulty keeping dexcom sensor on body, suggested alternative sites for wear and skin tac for adhesion. Per pt in need of accu chek lancets, will send rx. Receptive to education. Scheduled to attend class 2 on 11/27/23.

## 2023-10-02 NOTE — PROGRESS NOTES
-----------------------------------------------------------------  ENDOCRINE CLINIC NOTE  -----------------------------------------------------------------        PATIENT NAME: Marilyn Martins  PATIENT : 1965 AGE: 58 y.o.  MRN NUMBER: [id number]  PRIMARY CARE: Sondra Thomas APRN    ==========================================================================    CHIEF COMPLAINT: Type 2 Diabetes Mellitus and Hypothyrodism  DATE OF SERVICE: 10/02/23    ==========================================================================    HPI / SUBJECTIVE    58 y.o. female is seen in the clinic today for follow-up of type 2 Diabetes and Hypothyroidism.    Type 2 Diabetes Mellitus:  - Diagnosed with type 2 diabetes around   - A1c of 7.2% on 2023  - Current therapy:  Jardiance 25 mg p.o. daily  Insulin Basaglar 23 units nightly  Insulin lispro 8 units with each meal  - Medications not taking:  Ozempic due to abdominal pain and unable to eat for few days  - Patient have underlying history of CKD status post right nephrectomy in 2002  - Checking blood sugars through Dexcom but keep falling off and working with educator for placement. Checking BG with fingersticks in meantime.  - Following in ophthalmology in the clinic, next visit on 2023, had any diabetic changes in the eyes per patient.  - Reports to have mild neuropathy in legs.  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     Hypothyroidism:  - Diagnosed with hypothyroidism in   - Current dose is 175 mcg daily with one additional dose once a week    ==========================================================================                                                PAST MEDICAL HISTORY    Past Medical History:   Diagnosis Date    Allergic     Anemia     Anxiety     Arthritis     Asthma     Callus     Cataract     CHF (congestive heart failure)     Cholelithiasis      Gall bladder removed    Chronic constipation     Chronic diarrhea     COPD (chronic obstructive pulmonary disease) 2003    Depression     Diabetes mellitus     Difficulty walking     Unable to walk due to left leg amputated    Diverticulosis 2022    GERD (gastroesophageal reflux disease) 2005    Headache     Heart murmur 2007    Sometimes heard    History of transfusion     Last done on Dec 8th 2022 for right kidney removel    HL (hearing loss) 2012    Left ear drum is busted, hole in right ear, get excess of build up in both ears    Hyperlipidemia 2000    Hypothyroidism 1986    Uncontroled    Kidney stone 2022    Left side two removed by lazer surgery,right kidney removed Dec.8th of this year.    Low back pain 2001    Neuropathy in diabetes     Obesity 1989    Pneumonia 2022 March of this year    Renal insufficiency 2016    Shortness of breath     Sinusitis     Substance abuse     Urinary tract infection     Visual impairment 1985       ==========================================================================    PAST SURGICAL HISTORY    Past Surgical History:   Procedure Laterality Date    ABOVE KNEE AMPUTATION Left 02/2000    ANKLE OPEN REDUCTION INTERNAL FIXATION      Before on left ankle    CHOLECYSTECTOMY  2002    COLONOSCOPY  2004    EYE SURGERY  2014    Carrects    FOOT SURGERY      Had broken left ankle before amputation    HERNIA REPAIR      HYSTERECTOMY      JOINT REPLACEMENT      SUBTOTAL HYSTERECTOMY      TOENAIL EXCISION      Right big toe nail       ==========================================================================    FAMILY HISTORY    Family History   Problem Relation Age of Onset    Anxiety disorder Daughter     Developmental Disability Daughter     Diabetes Daughter         Pre diabetic with first child    Learning disabilities Daughter     Mental illness Daughter         Cutter, anything to harm herself, hanging, burning etc.    Anxiety disorder Daughter     Developmental Disability  Daughter     Diabetes Daughter     Learning disabilities Daughter     Mental illness Daughter         Cutters    Developmental Disability Son     Learning disabilities Son     Diabetes Mother        ==========================================================================    SOCIAL HISTORY    Social History     Socioeconomic History    Marital status:     Number of children: 3    Years of education: 12   Tobacco Use    Smoking status: Never    Smokeless tobacco: Never   Vaping Use    Vaping Use: Never used   Substance and Sexual Activity    Alcohol use: Yes     Comment: Wine coolers and sleeping pills together 1996    Drug use: Never    Sexual activity: Not Currently     Partners: Male     Birth control/protection: Hysterectomy       ==========================================================================    MEDICATIONS      Current Outpatient Medications:     Accu-Chek Softclix Lancets lancets, Use as instructed to check blood sugar before meals and at bedtime, Disp: 100 each, Rfl: 12    albuterol sulfate  (90 Base) MCG/ACT inhaler, Inhale 2 puffs every 6 hours as needed for wheezing and shortness of breath, Disp: 18 g, Rfl: 1    Blood Glucose Monitoring Suppl (Accu-Chek Guide) w/Device kit, 1 each 4 (Four) Times a Day After Meals & at Bedtime., Disp: 1 kit, Rfl: 0    cetirizine (Allergy Relief Cetirizine) 10 MG tablet, Take 1 tablet by mouth Daily., Disp: 30 tablet, Rfl: 3    Continuous Blood Gluc  (Dexcom G6 ) device, 1 each 4 (Four) Times a Day Before Meals & at Bedtime., Disp: 1 each, Rfl: 0    Continuous Blood Gluc Sensor (Dexcom G6 Sensor), Use to check blood sugar as directed. Replace sensor once every 10 days., Disp: 3 each, Rfl: 6    Continuous Blood Gluc Transmit (Dexcom G6 Transmitter) misc, Use to test blood sugar as directed. Replace transmitter every 90 days, Disp: 1 each, Rfl: 2    ferrous sulfate 324 (65 Fe) MG tablet delayed-release EC tablet, Take 1 tablet by  "mouth Daily With Breakfast., Disp: , Rfl:     furosemide (LASIX) 20 MG tablet, Take 1 tablet by mouth Daily., Disp: 30 tablet, Rfl: 3    gabapentin (NEURONTIN) 300 MG capsule, Take 1 capsule by mouth 3 (Three) Times a Day., Disp: 90 capsule, Rfl: 3    glucose blood (Accu-Chek Guide) test strip, Use as instructed to test blood sugar 4 times daily, Disp: 200 each, Rfl: 5    Insulin Glargine (Lantus SoloStar) 100 UNIT/ML injection pen, Inject 23 Units under the skin into the appropriate area as directed Every Night., Disp: 15 mL, Rfl: 5    Insulin Lispro, 1 Unit Dial, (HumaLOG KwikPen) 100 UNIT/ML solution pen-injector, Inject 8 Units under the skin into the appropriate area as directed 3 (Three) Times a Day With Meals., Disp: 15 mL, Rfl: 5    Insulin Pen Needle 32G X 4 MM misc, 1 each 4 (Four) Times a Day Before Meals & at Bedtime., Disp: 200 each, Rfl: 5    Jardiance 25 MG tablet tablet, Take 1 tablet by mouth Every Morning., Disp: 90 tablet, Rfl: 0    Levothyroxine Sodium 175 MCG capsule, Take 175 mcg by mouth Daily. Take 175 mcg by mouth daily and one additional pill a week, Disp: 35 capsule, Rfl: 6    Myrbetriq 25 MG tablet sustained-release 24 hour 24 hr tablet, Take 1 tablet by mouth Daily., Disp: 30 tablet, Rfl: 6    Omega-3 Fatty Acids (fish oil) 1000 MG capsule capsule, Take  by mouth., Disp: , Rfl:     pravastatin (PRAVACHOL) 10 MG tablet, Take 1 tablet by mouth Daily., Disp: 30 tablet, Rfl: 5    RELION INSULIN SYR 0.5ML/31G 31G X 5/16\" 0.5 ML misc, , Disp: , Rfl:     sodium bicarbonate 650 MG tablet, Take 1 tablet by mouth 3 (Three) Times a Day., Disp: 90 tablet, Rfl: 3    vitamin B-12 (CYANOCOBALAMIN) 100 MCG tablet, Take 0.5 tablets by mouth Daily., Disp: , Rfl:     vitamin C (ASCORBIC ACID) 250 MG tablet, Take 1 tablet by mouth Daily., Disp: , Rfl:     vitamin E 100 UNIT capsule, Take 1 capsule by mouth Daily., Disp: , Rfl:     SITagliptin (Januvia) 50 MG tablet, Take 1 tablet by mouth Daily., Disp: 90 " tablet, Rfl: 3    ==========================================================================    ALLERGIES    Allergies   Allergen Reactions    Latex Other (See Comments)    Morphine And Related Rash    Cephalexin Diarrhea and Nausea And Vomiting     Extreme vomiting, can't keep food or water down either     Codeine Other (See Comments)    Sulfa Antibiotics Other (See Comments)    Povidone Iodine Rash       ==========================================================================    OBJECTIVE    Vitals:    10/02/23 1240   BP: 144/84   Pulse: 95   SpO2: 96%     Body mass index is 45.91 kg/m².     General: Alert, cooperative, no acute distress  Thyroid:  no enlargement/tenderness/palpable nodules  Lungs: Clear to auscultation bilaterally, respirations unlabored  Heart: Regular rate and rhythm, S1 and S2 normal, no murmur, rub or gallop  Abdomen: Soft, NT, ND and Bowel sounds Positive    ==========================================================================    LAB EVALUATION    Lab Results   Component Value Date    GLUCOSE 85 09/27/2023    BUN 40 (H) 09/27/2023    CREATININE 1.31 (H) 09/27/2023    BCR 31 (H) 09/27/2023    K 4.7 09/27/2023    CO2 22 09/27/2023    CALCIUM 9.1 09/27/2023    PROTENTOTREF 7.4 09/27/2023    ALBUMIN 3.5 (L) 09/27/2023    LABIL2 0.9 (L) 09/27/2023    AST 18 09/27/2023    ALT 17 09/27/2023    CHOL 166 07/11/2023    TRIG 258 (H) 07/11/2023    HDL 28 (L) 07/11/2023    LDL 94 07/11/2023     Lab Results   Component Value Date    HGBA1C 7.2 (H) 09/27/2023    HGBA1C 8.10 (H) 07/11/2023    HGBA1C 7.20 (H) 04/10/2023     Lab Results   Component Value Date    MICROALBUR 2.3 07/11/2023    CREATININE 1.31 (H) 09/27/2023     Lab Results   Component Value Date    TSH 0.065 (L) 09/27/2023    FREET4 1.95 (H) 09/27/2023     CGM Data:    Dates: Aug 21 till Sep 19, 2023    Very High > 250: 0%  High 180 - 250: 17%  Target: 70 - 180: 82%  Low 55 - 70:  <1%  Very Low <  "55: <1%      ==========================================================================    ASSESSMENT AND PLAN    Assessment:  #Type 2 diabetes, uncontrolled, on long-term insulin therapy with hyperglycemia  #Hypothyroidism  #Hyperlipidemia  #Morbid obesity  #Peripheral neuropathy secondary to diabetes type 2  #CKD stage III with microalbuminuria    Plan:  - Reviewed CGM data fingersticks blood glucose  - A1c within acceptable limit  - Patient unable to tolerate Ozempic therapy causing significant abdominal pain and also significantly suppressing appetite that is causing lifestyle difficulty  - We will discontinue GLP-1 receptor agonist therapy and will introduce DPP 4 inhibitor therapy  - Otherwise continue same medical management without any active changes  - A1c within acceptable limit  - Patient new adjusted therapies:  Jardiance 25 mg p.o. daily  Insulin Basaglar 23 units nightly  Insulin lispro 8 units with each meal  Januvia 50 mgs once a day  - Continue Dexcom monitoring follow-up in my clinic in 3 months time  - Regarding hypothyroidism patient again have evidence of iatrogenic hyperthyroidism and therefore we will decrease the dose of levothyroxine while continuing 175 mcg p.o. daily but an additional half pill once a week and repeat thyroid function before next visit  - Continue pravastatin therapy for high cholesterol    Thank you for courtesy of consultation.    Return to clinic: 3 months    Entire assessment and plan was discussed and counseled the patient in detail to which patient verbalized understanding and agreed with care.  Answered all queries and concerns.    This note was created using voice recognition software and is inherently subject to errors including those of syntax and \"sound-alike\" substitutions which may escape proofreading.  In such instances, original meaning may be extrapolated by contextual derivation.    Note: Portions of this note may have been copied from previous notes but " documentation have been reviewed and edited as necessary to support clinical decision making for today's visit.    ==========================================================================    INFORMATION PROVIDED TO PATIENT    Patient Instructions   Please,     - Continue Jardiance therapy with no changes.  - Continue nighttime insulin with Basaglar 23 units every night.  - Use mealtime insulin 7 units with each meal with no correction scale.  Mealtime insulin needs to be taking 15 minutes before each meal.  Please check your blood sugar before giving mealtime insulin.  - Start Januvia 50 mg 1 pill by mouth daily.     Continue to check your blood sugar via Dexcom.       If your blood sugar is running less than 100 and fasting then decrease your nighttime insulin by 3 units.  If your blood sugar before lunch, before dinner and before bedtime snack is less than 100 then decrease your mealtime insulin by 2 units.     Continue pravastatin 10 mg 1 pill by mouth daily (preferably at bedtime).     For levothyroxine continue 175 mcg 1 pill by mouth early in the morning at fasting state and maintain fasting for 40 minutes at least.  Take an additional half pill of 175 mcg once a week, same day of every week.     Repeat blood work before next visit, fasting.    Follow-up in my clinic in 3 months time.    Thank you for your visit today.     If you have any questions or concerns please feel free to reach out of the office.      ==========================================================================  Ray Narvaez MD  Department of Endocrine, Diabetes and Metabolism  [facility], [place]  ==========================================================================

## 2023-10-02 NOTE — PATIENT INSTRUCTIONS
Please,     - Continue Jardiance therapy with no changes.  - Continue nighttime insulin with Basaglar 23 units every night.  - Use mealtime insulin 7 units with each meal with no correction scale.  Mealtime insulin needs to be taking 15 minutes before each meal.  Please check your blood sugar before giving mealtime insulin.  - Start Januvia 50 mg 1 pill by mouth daily.     Continue to check your blood sugar via Dexcom.       If your blood sugar is running less than 100 and fasting then decrease your nighttime insulin by 3 units.  If your blood sugar before lunch, before dinner and before bedtime snack is less than 100 then decrease your mealtime insulin by 2 units.     Continue pravastatin 10 mg 1 pill by mouth daily (preferably at bedtime).     For levothyroxine continue 175 mcg 1 pill by mouth early in the morning at fasting state and maintain fasting for 40 minutes at least.  Take an additional half pill of 175 mcg once a week, same day of every week.     Repeat blood work before next visit, fasting.    Follow-up in my clinic in 3 months time.    Thank you for your visit today.     If you have any questions or concerns please feel free to reach out of the office.

## 2023-10-02 NOTE — PROGRESS NOTES
Specialty Pharmacy Patient Management Program  Endocrinology Medication Addition Assessment     Marilyn Martins is a female seen by an Endocrinology provider for Type 2 Diabetes and enrolled in the Endocrinology Patient Management program offered by [facility].  This assessment was conducted as a result of a specialty medication addition or substitution. The patient was previously introduced to services offered by [facility], including: regular assessments, refill coordination, curbside pick-up or mail order delivery options, prior authorization maintenance, and financial assistance programs as applicable. The patient was also provided with contact information for the pharmacy team.      An initial outreach was conducted, including assessment of therapy appropriateness and specialty medication education for Jardiance and Ozempic. Patient was unable to tolerate ozempic and this was discontinued.    A follow-up outreach was conducted, including assessment of continued therapy appropriateness, medication adherence, and side effect incidence and management for Januvia.    Insurance Coverage & Financial Support  $0 copay for medications    Relevant Past Medical History and Comorbidities  Relevant medical history and concomitant health conditions were discussed with the patient. The patient's chart has been reviewed for relevant past medical history and comorbid conditions and updated as necessary.  Past Medical History:   Diagnosis Date    Allergic     Anemia     Anxiety     Arthritis     Asthma     Callus     Cataract     CHF (congestive heart failure)     Cholelithiasis 1987    Gall bladder removed    Chronic constipation     Chronic diarrhea     COPD (chronic obstructive pulmonary disease) 2003    Depression     Diabetes mellitus     Difficulty walking     Unable to walk due to left leg amputated    Diverticulosis 2022    GERD (gastroesophageal reflux disease) 2005     Headache     Heart murmur 2007    Sometimes heard    History of transfusion     Last done on Dec 8th 2022 for right kidney removel    HL (hearing loss) 2012    Left ear drum is busted, hole in right ear, get excess of build up in both ears    Hyperlipidemia 2000    Hypothyroidism 1986    Uncontroled    Kidney stone 2022    Left side two removed by lazer surgery,right kidney removed Dec.8th of this year.    Low back pain 2001    Neuropathy in diabetes     Obesity 1989    Pneumonia 2022 March of this year    Renal insufficiency 2016    Shortness of breath     Sinusitis     Substance abuse     Urinary tract infection     Visual impairment 1985     Social History     Socioeconomic History    Marital status:     Number of children: 3    Years of education: 12   Tobacco Use    Smoking status: Never    Smokeless tobacco: Never   Vaping Use    Vaping Use: Never used   Substance and Sexual Activity    Alcohol use: Yes     Comment: Wine coolers and sleeping pills together 1996    Drug use: Never    Sexual activity: Not Currently     Partners: Male     Birth control/protection: Hysterectomy       Problem list reviewed by Mattie Chen, PharmD on 10/2/2023 at  4:11 PM    Hospitalizations and Urgent Care Since Last Assessment  ED Visits, Admissions, or Hospitalizations: none  Urgent Office Visits: none    Allergies  Known allergies and reactions were discussed with the patient. The patient's chart has been reviewed for  allergy information and updated as necessary.   Allergies   Allergen Reactions    Latex Other (See Comments)    Morphine And Related Rash    Cephalexin Diarrhea and Nausea And Vomiting     Extreme vomiting, can't keep food or water down either     Codeine Other (See Comments)    Sulfa Antibiotics Other (See Comments)    Povidone Iodine Rash       Allergies reviewed by Mattie Chen, PharmD on 10/2/2023 at  4:10 PM    Relevant Laboratory Values  A1C Last 3 Results          4/10/2023    12:09 7/11/2023     10:37 9/27/2023    13:26   HGBA1C Last 3 Results   Hemoglobin A1C 7.20  8.10  7.2      Lab Results   Component Value Date    HGBA1C 7.2 (H) 09/27/2023     Lab Results   Component Value Date    GLUCOSE 85 09/27/2023    CALCIUM 9.1 09/27/2023     09/27/2023    K 4.7 09/27/2023    CO2 22 09/27/2023     09/27/2023    BUN 40 (H) 09/27/2023    CREATININE 1.31 (H) 09/27/2023    BCR 31 (H) 09/27/2023    ANIONGAP 13.2 04/10/2023     Lab Results   Component Value Date    CHOL 166 07/11/2023    TRIG 258 (H) 07/11/2023    HDL 28 (L) 07/11/2023    LDL 94 07/11/2023     Microalbumin          7/11/2023    10:37   Microalbumin   Microalbumin, Urine 2.3        Current Medication List  This medication list has been reviewed with the patient and evaluated for any interactions or necessary modifications/recommendations, and updated to include all prescription medications, OTC medications, and supplements the patient is currently taking.  This list reflects what is contained in the patient's profile, which has also been marked as reviewed to communicate to other providers it is the most up to date version of the patient's current medication therapy.     Current Outpatient Medications:     Accu-Chek Softclix Lancets lancets, Use as instructed to check blood sugar before meals and at bedtime, Disp: 100 each, Rfl: 12    albuterol sulfate  (90 Base) MCG/ACT inhaler, Inhale 2 puffs every 6 hours as needed for wheezing and shortness of breath, Disp: 18 g, Rfl: 1    Blood Glucose Monitoring Suppl (Accu-Chek Guide) w/Device kit, 1 each 4 (Four) Times a Day After Meals & at Bedtime., Disp: 1 kit, Rfl: 0    cetirizine (Allergy Relief Cetirizine) 10 MG tablet, Take 1 tablet by mouth Daily., Disp: 30 tablet, Rfl: 3    Continuous Blood Gluc  (Dexcom G6 ) device, 1 each 4 (Four) Times a Day Before Meals & at Bedtime., Disp: 1 each, Rfl: 0    Continuous Blood Gluc Sensor (Dexcom G6 Sensor), Use to check blood  "sugar as directed. Replace sensor once every 10 days., Disp: 3 each, Rfl: 6    Continuous Blood Gluc Transmit (Dexcom G6 Transmitter) misc, Use to test blood sugar as directed. Replace transmitter every 90 days, Disp: 1 each, Rfl: 2    ferrous sulfate 324 (65 Fe) MG tablet delayed-release EC tablet, Take 1 tablet by mouth Daily With Breakfast., Disp: , Rfl:     furosemide (LASIX) 20 MG tablet, Take 1 tablet by mouth Daily., Disp: 30 tablet, Rfl: 3    gabapentin (NEURONTIN) 300 MG capsule, Take 1 capsule by mouth 3 (Three) Times a Day., Disp: 90 capsule, Rfl: 3    glucose blood (Accu-Chek Guide) test strip, Use as instructed to test blood sugar 4 times daily, Disp: 200 each, Rfl: 5    Insulin Glargine (Lantus SoloStar) 100 UNIT/ML injection pen, Inject 23 Units under the skin into the appropriate area as directed Every Night., Disp: 15 mL, Rfl: 5    Insulin Lispro, 1 Unit Dial, (HumaLOG KwikPen) 100 UNIT/ML solution pen-injector, Inject 8 Units under the skin into the appropriate area as directed 3 (Three) Times a Day With Meals., Disp: 15 mL, Rfl: 5    Insulin Pen Needle 32G X 4 MM misc, 1 each 4 (Four) Times a Day Before Meals & at Bedtime., Disp: 200 each, Rfl: 5    Jardiance 25 MG tablet tablet, Take 1 tablet by mouth Every Morning., Disp: 90 tablet, Rfl: 0    Levothyroxine Sodium 175 MCG capsule, Take 175 mcg by mouth Daily. Take 175 mcg by mouth daily and one additional pill a week, Disp: 35 capsule, Rfl: 6    Myrbetriq 25 MG tablet sustained-release 24 hour 24 hr tablet, Take 1 tablet by mouth Daily., Disp: 30 tablet, Rfl: 6    Omega-3 Fatty Acids (fish oil) 1000 MG capsule capsule, Take  by mouth., Disp: , Rfl:     pravastatin (PRAVACHOL) 10 MG tablet, Take 1 tablet by mouth Daily., Disp: 30 tablet, Rfl: 5    RELION INSULIN SYR 0.5ML/31G 31G X 5/16\" 0.5 ML misc, , Disp: , Rfl:     SITagliptin (Januvia) 50 MG tablet, Take 1 tablet by mouth Daily., Disp: 90 tablet, Rfl: 3    sodium bicarbonate 650 MG tablet, " Take 1 tablet by mouth 3 (Three) Times a Day., Disp: 90 tablet, Rfl: 3    vitamin B-12 (CYANOCOBALAMIN) 100 MCG tablet, Take 0.5 tablets by mouth Daily., Disp: , Rfl:     vitamin C (ASCORBIC ACID) 250 MG tablet, Take 1 tablet by mouth Daily., Disp: , Rfl:     vitamin E 100 UNIT capsule, Take 1 capsule by mouth Daily., Disp: , Rfl:     Medicines reviewed by Mattie Chen PharmD on 10/2/2023 at  4:11 PM    Drug Interactions  none    Recommended Medications Assessment  Aspirin: Not Taking Currently  Statin: Currently Taking   ACEi/ARB: Not Taking Currently    Initial Education Provided for Specialty Medication  The patient has been provided with the following education and any applicable administration techniques (i.e. self-injection) have been demonstrated for the therapies indicated. All questions and concerns have been addressed prior to the patient receiving the medication, and the patient has verbalized comprehension of the education and any materials provided. Additional patient education shall be provided and documented upon request by the patient, provider, or payer.    JANUMET® (sitagliptin + metformin)  Medication Expectations   Why am I taking this medication? You are taking this medication to lower blood sugar because you have type 2 diabetes. Diabetes cannot be cured, but with proper medication and treatment, your blood sugar can be kept within your personalized target range.    What should I expect while on this medication? You should expect to see your blood sugar and A1c decrease over time.   How does the medication work? Janumet works by helping to increase the amount of insulin released by your pancreas and lowering the amount of sugar your liver makes. It also decreases the amount of sugar that enters the blood after a meal and makes your body more sensitive to insulin.      How long will I be on this medication for? The amount of time you will be on this medication will be determined by your doctor  based on blood sugar and A1c control. There is a good chance you will be on this medication or another medication for diabetes throughout your lifetime. You should not abruptly stop this medication without talking to your doctor first.    How do I take this medication? Take as directed on your prescription label. Janumet may be taken twice a day (immediate release tablets) or once a day (extended release tablets). Taking with food can decrease the chance of upset stomach.      What are some possible side effects? The most common side effects include diarrhea, gas, nausea or indigestion. You may also experience headache or joint stiffness/pain.  Talk with your doctor if you notice these side effects and they do not go away or get better with time.     What happens if I miss a dose? If you miss a dose, take it as soon as you remember. If it is close to your next dose, skip it (do not take 2 doses at once)     Medication Safety   What are things I should warn my doctor immediately about? Tell your doctor if you have kidney disease, liver disease, heart failure, pancreas problems, or vitamin B-12 deficiency. Tell your doctor if you drink alcohol, have had or are planning to have surgery. Talk to your doctor if you are pregnant, planning to become pregnant, or breastfeeding. Also tell your doctor if you notice any signs/symptoms of an allergic reaction (rash, hives, difficulty breathing, etc.) or blisters on your skin.    What are things that I should be aware of? Be cautious of any side effects from this medication. Talk to your doctor if any new ones develop or aren't getting better.  If you take the extended-release tablets, you may see something that looks like a tablet in your stool.    What are some medications that can interact with this one? This medication can interact with the dye used for an x-ray or CT-scan. Some common medications that interact include ranolazine, cimetidine, and other medications that may  also lower your blood sugar such as insulins and glipizide/glimepiride/glyburide. Your doctor may reduce the dose of these medications when you start Janumet to minimize low blood sugars. Always tell your doctor or pharmacist immediately if you start taking any new medications, including over-the-counter medications, vitamins, and herbal supplements.      Medication Storage/Handling   How should I handle this medication? Keep this medication out of reach of pets/children in tightly sealed container   How does this medication need to be stored? Store at room temperature and keep dry (not in bathroom or other room with moisture)   How should I dispose of this medication? There should not be a need to dispose of this medication unless your provider decides to change the dose or therapy. If that is the case, take to your local police station for proper disposal. Some pharmacies also have take-back bins for medication drop-off.      Resources/Support   How can I remind myself to take this medication? You can download reminder apps to help you manage your refills or set an alarm on your phone to remind you. The pharmacy carries pill boxes that you can place next to an area you pass everyday (such as where you place your car keys or where you charge your phone)   Is financial support available?  [employer] can provide co-pay cards if you have commercial insurance or patient assistance if you have Medicare or no insurance.    Which vaccines are recommended for me? Talk to your doctor about these vaccines: Flu, Coronavirus (COVID-19), Pneumococcal (pneumonia), Tdap, Hepatitis B, Zoster (shingles)      JARDIANCE® (empagliflozin)  Medication Expectations   Why am I taking this medication? You are taking this medication to lower blood sugar because you have type 2 diabetes. Diabetes is not curable but with proper medication and treatment, we can keep your blood sugar within your personalized target range. This medication also helps  reduce the risk of death from heart attack or stroke if you have heart disease and type 2 diabetes.   What should I expect while on this medication? You should expect to see your blood sugar and A1c decrease over time. You may also see a decrease in your blood pressure and it can help some people lose weight.     How does the medication work? Jardiance works by helping to remove some sugar that the body doesn't need through urination.    How long will I be on this medication for? The amount of time you will be on this medication will be determined by your doctor based on blood sugar and A1c control. You will most likely be on this medication or another diabetes medication throughout your lifetime. Do not abruptly stop this medication without talking to your doctor first.    How do I take this medication? Take as directed on your prescription label. This medication is usually taken in the morning and can be given with or without food.    What are some possible side effects? You may notice increased urination, especially when you first start Jardiance. The most common side effects are urinary tract infections and yeast infections and are more commonly seen in females. Talk with your doctor if you notice white or yellow vaginal discharge, vaginal itching or odor of if you notice redness, itching, pain, or swelling of the penis and/or bad-smelling discharge from the penis.    What happens if I miss a dose? If you miss a dose, take it as soon as you remember. If it is close to your next dose, skip it (do not take 2 doses at once)     Medication Safety   What are things I should warn my doctor immediately about? Tell your doctor if you have kidney disease, liver disease, heart failure, pancreas problems, or history of frequent genital yeast or urinary tract infections. Tell your doctor if you are on a low-salt diet, if you drink alcohol, or if you are having surgery. Talk to your doctor if you are pregnant, planning to  become pregnant, or breastfeeding. Also tell your doctor if you notice any signs/symptoms of an allergic reaction (rash, hives, difficulty breathing, etc.).   What are things that I should be cautious of? Be cautious of any side effects from this medication. Talk to your doctor if any new ones develop or aren't getting better.   What are some medications that can interact with this one? Some medications that interact include diuretics (water pills) and other medications that may also lower your blood sugar such as insulins and glipizide/glimepiride/glyburide. Your doctor may reduce the dose of these medications when you start Jardiance to minimize low blood sugars. Always tell your doctor or pharmacist immediately if you start taking any new medications, including over-the-counter medications, vitamins, and herbal supplements.      Medication Storage/Handling   How should I handle this medication? Keep this medication out of reach of pets/children in tightly sealed container   How does this medication need to be stored? Store at room temperature and keep dry (don't keep in bathroom or other room with moisture)   How should I dispose of this medication? There should not be a need to dispose of this medication unless your provider decides to change the dose or therapy. If that is the case, take to your local police station for proper disposal. Some pharmacies also have take-back bins for medication drop-off.      Resources/Support   How can I remind myself to take this medication? You can download reminder apps to help you manage your refills. You may also set an alarm on your phone to remind you. The pharmacy carries pill boxes that you can place next to an area you pass everyday (such as where you place your car keys or where you charge your phone)   Is financial support available?  [employer] can provide co-pay cards if you have commercial insurance or patient assistance if you have Medicare or no  insurance.    Which vaccines are recommended for me? Talk to your doctor about these vaccines: Flu, Coronavirus (COVID-19), Pneumococcal (pneumonia), Tdap, Hepatitis B, Zoster (shingles)           Adherence, Self-Administration, and Current Therapy Problems  Adherence related to the patient's specialty therapy was discussed with the patient. The Adherence segment of this outreach has been reviewed and updated.     Is there a concern with patient's ability to self administer the medication correctly and without issue?: No  Were any potential barriers to adherence identified during the initial assessment or patient education?: No  Are there any concerns regarding the patient's understanding of the importance of medication adherence?: No         Additional Barriers to Patient Self-Administration: none identified  Methods for Supporting Patient Self-Administration: n/a    Open Medication Therapy Problems  No medication therapy recommendations to display    Adverse Drug Reactions  Medication tolerability: Tolerating with no to minimal ADRs  Medication plan: Continue therapy with normal follow-up  Plan for ADR Management: n/a    Goals of Therapy  Goals related to the patient's specialty therapy were discussed with the patient. The Patient Goals segment of this outreach has been reviewed and updated.   Goals Addressed Today        Specialty Pharmacy General Goal      Hemoglobin    A1c < 7%    Lab Results   Component Value Date    HGBA1C 7.2 (H) 09/27/2023    HGBA1C 8.10 (H) 07/11/2023    HGBA1C 7.20 (H) 04/10/2023    HGBA1C 6.2 (H) 01/11/2023    HGBA1C 6.7 (H) 12/09/2022                Quality of Life Assessment   Quality of Life related to the patient's enrollment in the patient management program and services provided was discussed with the patient. The QOL segment of this outreach has been reviewed and updated.    Quality of Life Improvement Scale: Moderately better    Reassessment Plan & Follow-Up  1. Medication  Therapy Changes: Stop taking ozempic and start januvia 25 mg once daily. Continue jardiance and basaglar.  2. Related Plans, Therapy Recommendations, or Therapy Problems to Be Addressed: none  3. Pharmacist to perform regular assessments no more than (6) months from the previous assessment.  4. Care Coordinator to set up future refill outreaches, coordinate prescription delivery, and escalate clinical questions to pharmacist.    Attestation  Therapeutic appropriateness: Appropriate   I attest the patient was actively involved in and has agreed to the above plan of care. If the prescribed therapy is at any point deemed not appropriate based on the current or future assessments, a consultation will be initiated with the patient's specialty care provider to determine the best course of action. The revised plan of therapy will be documented along with any required assessments and/or additional patient education provided.     Mattie Chen, [place]  Clinical Specialty Pharmacist, Endocrinology  10/2/2023  16:13 EDT

## 2023-10-05 ENCOUNTER — OFFICE VISIT (OUTPATIENT)
Dept: FAMILY MEDICINE CLINIC | Facility: CLINIC | Age: 58
End: 2023-10-05
Payer: MEDICAID

## 2023-10-05 VITALS
TEMPERATURE: 98.9 F | HEART RATE: 93 BPM | DIASTOLIC BLOOD PRESSURE: 66 MMHG | HEIGHT: 61 IN | SYSTOLIC BLOOD PRESSURE: 104 MMHG | OXYGEN SATURATION: 95 % | BODY MASS INDEX: 46.26 KG/M2 | WEIGHT: 245 LBS

## 2023-10-05 DIAGNOSIS — S78.112A ABOVE KNEE AMPUTATION OF LEFT LOWER EXTREMITY: Primary | ICD-10-CM

## 2023-10-05 DIAGNOSIS — R53.1 WEAKNESS: ICD-10-CM

## 2023-10-05 DIAGNOSIS — R26.2 UNABLE TO AMBULATE: ICD-10-CM

## 2023-10-05 NOTE — PROGRESS NOTES
Subjective   Marilyn Martins is a 58 y.o. female presents for   Chief Complaint   Patient presents with    Diabetes     3 month follow up    needs a new wheelchair     Has broken hers and is having to borrow one       Health Maintenance Due   Topic Date Due    ANNUAL PHYSICAL  Never done    DIABETIC EYE EXAM  Never done    INFLUENZA VACCINE  08/01/2023    COVID-19 Vaccine (3 - 2023-24 season) 09/01/2023       History of Present Illness   Pt present for 3 month follow up diabetes. She also requests a power wheelchair.  She states she has a regular wheelchair at home but states it is broken.  She  has a left above the knee amputation and does not wear a prosthetic. She states she tried but couldn't get it to work right for her.  She denies recent falls.    She states she has been going to the [facility] lately and bloodsugars are not controlled at this time.  She has changed medications due to side effects.  She tried ozempic and it caused her to feel ill.  She is currently taking januvia and denies problems or side effects at this time.  She had labs drawn last week and results reviewed.  No need for further lab work today.  She was also recently seen by urology and prescribed abx but unable to take them.  Patient encouraged to contact their office for further management.  Patient was also asking how to prevent UTIs and discussed hygiene habits as well as increasing fluid intake with her.    Patient has multiple significant medical problems including severe lower extremity weakness, high risk for falls, and left above the knee amputation .  Because of these issues, their mobility is significantly reduced.  Patient is at high risk for recurrent fall.  Patient would benefit from use of a motorized wheelchar.  Patient has sufficient upper extremity strength and mental capacity to operate device. Patient has a caregiver who is available, willing, and able to provide assistance with the device.           Vitals:     "10/05/23 0813   BP: 104/66   BP Location: Right arm   Patient Position: Sitting   Cuff Size: Large Adult   Pulse: 93   Temp: 98.9 °F (37.2 °C)   TempSrc: Tympanic   SpO2: 95%   Weight: 111 kg (245 lb)   Height: 154.9 cm (60.98\")     Body mass index is 46.32 kg/m².    Current Outpatient Medications on File Prior to Visit   Medication Sig Dispense Refill    Accu-Chek Softclix Lancets lancets Use as instructed to check blood sugar before meals and at bedtime 100 each 12    albuterol sulfate  (90 Base) MCG/ACT inhaler Inhale 2 puffs every 6 hours as needed for wheezing and shortness of breath 18 g 1    Blood Glucose Monitoring Suppl (Accu-Chek Guide) w/Device kit 1 each 4 (Four) Times a Day After Meals & at Bedtime. 1 kit 0    cetirizine (Allergy Relief Cetirizine) 10 MG tablet Take 1 tablet by mouth Daily. 30 tablet 3    Continuous Blood Gluc  (Dexcom G6 ) device 1 each 4 (Four) Times a Day Before Meals & at Bedtime. 1 each 0    Continuous Blood Gluc Sensor (Dexcom G6 Sensor) Use to check blood sugar as directed. Replace sensor once every 10 days. 3 each 6    Continuous Blood Gluc Transmit (Dexcom G6 Transmitter) misc Use to test blood sugar as directed. Replace transmitter every 90 days 1 each 2    ferrous sulfate 324 (65 Fe) MG tablet delayed-release EC tablet Take 1 tablet by mouth Daily With Breakfast.      furosemide (LASIX) 20 MG tablet Take 1 tablet by mouth Daily. 30 tablet 3    gabapentin (NEURONTIN) 300 MG capsule Take 1 capsule by mouth 3 (Three) Times a Day. 90 capsule 3    glucose blood (Accu-Chek Guide) test strip Use as instructed to test blood sugar 4 times daily 200 each 5    Insulin Glargine (Lantus SoloStar) 100 UNIT/ML injection pen Inject 23 Units under the skin into the appropriate area as directed Every Night. 15 mL 5    Insulin Lispro, 1 Unit Dial, (HumaLOG KwikPen) 100 UNIT/ML solution pen-injector Inject 8 Units under the skin into the appropriate area as directed 3 " "(Three) Times a Day With Meals. 15 mL 5    Insulin Pen Needle 32G X 4 MM misc 1 each 4 (Four) Times a Day Before Meals & at Bedtime. 200 each 5    Jardiance 25 MG tablet tablet Take 1 tablet by mouth Every Morning. 90 tablet 0    Levothyroxine Sodium 175 MCG capsule Take 175 mcg by mouth Daily. Take 175 mcg by mouth daily and one additional pill a week 35 capsule 6    Myrbetriq 25 MG tablet sustained-release 24 hour 24 hr tablet Take 1 tablet by mouth Daily. 30 tablet 6    Omega-3 Fatty Acids (fish oil) 1000 MG capsule capsule Take  by mouth.      pravastatin (PRAVACHOL) 10 MG tablet Take 1 tablet by mouth Daily. 30 tablet 5    RELION INSULIN SYR 0.5ML/31G 31G X 5/16\" 0.5 ML misc       SITagliptin (Januvia) 50 MG tablet Take 1 tablet by mouth Daily. 90 tablet 3    sodium bicarbonate 650 MG tablet Take 1 tablet by mouth 3 (Three) Times a Day. 90 tablet 3    vitamin B-12 (CYANOCOBALAMIN) 100 MCG tablet Take 0.5 tablets by mouth Daily.      vitamin C (ASCORBIC ACID) 250 MG tablet Take 1 tablet by mouth Daily.      vitamin E 100 UNIT capsule Take 1 capsule by mouth Daily.      cephalexin (KEFLEX) 500 MG capsule Take 1 capsule by mouth 3 times a day. (Patient not taking: Reported on 10/5/2023) 21 capsule 0     No current facility-administered medications on file prior to visit.       The following portions of the patient's history were reviewed and updated as appropriate: allergies, current medications, past family history, past medical history, past social history, past surgical history, and problem list.    Review of Systems   Genitourinary:  Positive for urinary incontinence.   Neurological:  Positive for weakness.     Objective   Physical Exam  Vitals and nursing note reviewed.   Constitutional:       Appearance: Normal appearance. She is well-developed. She is obese.   HENT:      Head: Normocephalic and atraumatic.      Right Ear: External ear normal.      Left Ear: External ear normal.      Nose: Nose normal. "   Eyes:      Extraocular Movements: Extraocular movements intact.      Pupils: Pupils are equal, round, and reactive to light.   Cardiovascular:      Rate and Rhythm: Normal rate and regular rhythm.      Heart sounds: Normal heart sounds.   Pulmonary:      Effort: Pulmonary effort is normal.      Breath sounds: Normal breath sounds.   Abdominal:      General: Bowel sounds are normal.      Palpations: Abdomen is soft.   Genitourinary:     Vagina: Normal.   Musculoskeletal:         General: Normal range of motion.      Cervical back: Normal range of motion and neck supple.      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   Skin:     General: Skin is warm and dry.   Neurological:      General: No focal deficit present.      Mental Status: She is alert and oriented to person, place, and time.   Psychiatric:         Mood and Affect: Mood normal.         Behavior: Behavior normal.         Judgment: Judgment normal.     PHQ-9 Total Score:      Assessment & Plan   Diagnoses and all orders for this visit:    1. Above knee amputation of left lower extremity (Primary)  -     Motorized Wheelchair    2. Weakness  -     Motorized Wheelchair    3. Unable to ambulate  -     Motorized Wheelchair        There are no Patient Instructions on file for this visit.     Answers submitted by the patient for this visit:  Other (Submitted on 9/28/2023)  Please describe your symptoms.: Broke wheel chair needs new script for new one. Blood sugars staying high, may have uti.  Have you had these symptoms before?: Yes  How long have you been having these symptoms?: 1-2 weeks  Primary Reason for Visit (Submitted on 9/28/2023)  What is the primary reason for your visit?: Other

## 2023-10-17 ENCOUNTER — SPECIALTY PHARMACY (OUTPATIENT)
Dept: ENDOCRINOLOGY | Facility: CLINIC | Age: 58
End: 2023-10-17
Payer: MEDICAID

## 2023-10-17 RX ORDER — EMPAGLIFLOZIN 25 MG/1
25 TABLET, FILM COATED ORAL EVERY MORNING
Qty: 90 TABLET | Refills: 1 | Status: SHIPPED | OUTPATIENT
Start: 2023-10-17

## 2023-10-17 NOTE — PROGRESS NOTES
"   Specialty Pharmacy Patient Management Program  One-Time Clinical Outreach     Marilyn Martins is a 58 y.o. female seen by an Endocrinology provider for Type 2 Diabetes and enrolled in the Endocrinology Patient Management program offered by [facility].      Called to follow up with patient about the tolerability of januvia. Spoke with patient's sister Karla and she denies any issues or side effects with the medication.   Specialty Pharmacy Patient Management Program  Endocrinology Refill Outreach      Marilyn \"Delphine\" is a 58 y.o. female contacted today regarding refills of her medication(s).    Specialty medication(s) and dose(s) confirmed: yes  Other medication(s) being refilled: see below for medications being filled    Refill Questions      Flowsheet Row Most Recent Value   Changes to allergies? No   Changes to medications? No   New conditions since last clinic visit No   Unplanned office visit, urgent care, ED, or hospital admission in the last 4 weeks  No   How does patient/caregiver feel medication is working? Very good   Financial problems or insurance changes  No   Since the previous refill, were any specialty medication doses or scheduled injections missed or delayed?  No   Does this patient require a clinical escalation to a pharmacist? No          Delivery Questions      Flowsheet Row Most Recent Value   Delivery method  at Pharmacy   Medication(s) being filled and delivered Mirabegron, Pravastatin Sodium, Levothyroxine Sodium (Thyroid Hormones), Continuous Blood Gluc Transmit, Furosemide, Gabapentin (Anticonvulsants - Misc.), Sodium Bicarbonate (Antacids - Bicarbonate), Insulin Lispro, Insulin Glargine, Cetirizine Hcl (Antihistamines - Non-Sedating)   Copay form of payment Pay at pickup   Questions or concerns for the pharmacist? No   Are any medications first time fills? No            Follow-Up: 30      Mattie Chen, PharmD  Clinical Specialty Pharmacist, " Endocrinology  10/17/2023  16:27 EDT

## 2023-10-18 ENCOUNTER — TELEPHONE (OUTPATIENT)
Dept: FAMILY MEDICINE CLINIC | Facility: CLINIC | Age: 58
End: 2023-10-18
Payer: MEDICAID

## 2023-10-18 DIAGNOSIS — S78.112A ABOVE KNEE AMPUTATION OF LEFT LOWER EXTREMITY: Primary | ICD-10-CM

## 2023-10-18 DIAGNOSIS — R53.1 WEAKNESS: ICD-10-CM

## 2023-10-18 DIAGNOSIS — R26.2 UNABLE TO AMBULATE: ICD-10-CM

## 2023-10-18 NOTE — TELEPHONE ENCOUNTER
Caller: [facility] [place]    Relationship to patient:     Best call back number: [phone] OPTION 3      Patient is needing:   CALLING TO LET GENARO LOBATO KNOW THAT PATIENT DOES NOT MEET THE REQUIREMENTS FOR A POWER WHEEL CHAIR.     SHE IS PHYSICALLY ABLE TO USE A MANUAL WHEELCHAIR TO GET ROOM FROM ROOM.    HER CURRENT MANUAL WHEELCHAIR HOWEVER IS BROKEN, AND SHE WILL NEED A NEW PRESCRIPTION FOR A NEW ONE.

## 2023-11-15 ENCOUNTER — SPECIALTY PHARMACY (OUTPATIENT)
Dept: ENDOCRINOLOGY | Facility: CLINIC | Age: 58
End: 2023-11-15
Payer: MEDICAID

## 2023-11-15 RX ORDER — FUROSEMIDE 20 MG/1
20 TABLET ORAL DAILY
Qty: 30 TABLET | Refills: 0 | Status: SHIPPED | OUTPATIENT
Start: 2023-11-15

## 2023-11-15 NOTE — PROGRESS NOTES
"Specialty Pharmacy Refill Coordination Note     Marilyn \"Delphine\" is a 58 y.o. female contacted today regarding refills of her specialty medication(s).    Specialty medication(s) and dose(s) confirmed: yes  Changes to medications: no  Changes to insurance: n/a  Reviewed and verified with patient:         Refill Questions      Flowsheet Row Most Recent Value   Changes to allergies? No   Changes to medications? No   New conditions since last clinic visit No   Unplanned office visit, urgent care, ED, or hospital admission in the last 4 weeks  No   How does patient/caregiver feel medication is working? Good   Financial problems or insurance changes  No   Since the previous refill, were any specialty medication doses or scheduled injections missed or delayed?  No   Does this patient require a clinical escalation to a pharmacist? No            Delivery Questions      Flowsheet Row Most Recent Value   Delivery method  at Pharmacy   Number of medications in delivery 10   Medication(s) being filled and delivered Glucose Blood, Lancets, Insulin Pen Needle, Cetirizine Hcl (Antihistamines - Non-Sedating), Continuous Blood Gluc Sensor, Gabapentin (Anticonvulsants - Misc.), Levothyroxine Sodium (Thyroid Hormones), Mirabegron, Pravastatin Sodium, Sodium Bicarbonate (Antacids - Bicarbonate)   Is there any medication that is due not being filled? No   Supplies needed? No supplies needed   Cooler needed? No   Do any medications need mixed or dated? No   Questions or concerns for the pharmacist? No   Are any medications first time fills? No                 Follow-up: 1 month(s)     Shirley Arellano, Pharmacy Technician  Specialty Pharmacy Technician   11/15/2023   09:59 EST      "

## 2023-11-20 ENCOUNTER — SPECIALTY PHARMACY (OUTPATIENT)
Dept: ENDOCRINOLOGY | Facility: CLINIC | Age: 58
End: 2023-11-20
Payer: MEDICAID

## 2023-11-20 NOTE — PROGRESS NOTES
"Specialty Pharmacy Refill Coordination Note     Marilyn \"Delphine\" is a 58 y.o. female contacted today regarding refills of her specialty medication(s).    Specialty medication(s) and dose(s) confirmed: yes  Changes to medications: no  Changes to insurance: no  Reviewed and verified with patient:         Refill Questions      Flowsheet Row Most Recent Value   Changes to allergies? No   Changes to medications? No   New conditions since last clinic visit No   Unplanned office visit, urgent care, ED, or hospital admission in the last 4 weeks  No   How does patient/caregiver feel medication is working? Good   Financial problems or insurance changes  No   Since the previous refill, were any specialty medication doses or scheduled injections missed or delayed?  No   Does this patient require a clinical escalation to a pharmacist? No            Delivery Questions      Flowsheet Row Most Recent Value   Delivery method  at Pharmacy   Number of medications in delivery 2   Medication(s) being filled and delivered Furosemide, Levothyroxine Sodium (Thyroid Hormones)   Is there any medication that is due not being filled? No   Supplies needed? No supplies needed   Cooler needed? No   Do any medications need mixed or dated? No   Questions or concerns for the pharmacist? No   Are any medications first time fills? No                 Follow-up: 1 month(s)     Shirley Arellano, Pharmacy Technician  Specialty Pharmacy Technician   11/20/2023   15:02 EST      "

## 2023-12-07 RX ORDER — FUROSEMIDE 20 MG/1
20 TABLET ORAL DAILY
Qty: 30 TABLET | Refills: 0 | Status: CANCELLED | OUTPATIENT
Start: 2023-12-07

## 2023-12-07 RX ORDER — FUROSEMIDE 20 MG/1
20 TABLET ORAL DAILY
Qty: 30 TABLET | Refills: 6 | Status: SHIPPED | OUTPATIENT
Start: 2023-12-07

## 2023-12-07 RX ORDER — GABAPENTIN 300 MG/1
300 CAPSULE ORAL 3 TIMES DAILY
Qty: 90 CAPSULE | Refills: 3 | Status: SHIPPED | OUTPATIENT
Start: 2023-12-07

## 2023-12-07 RX ORDER — GABAPENTIN 300 MG/1
300 CAPSULE ORAL 3 TIMES DAILY
Qty: 90 CAPSULE | Refills: 3 | Status: CANCELLED | OUTPATIENT
Start: 2023-12-07

## 2023-12-07 NOTE — TELEPHONE ENCOUNTER
Rx Refill Note  Requested Prescriptions     Pending Prescriptions Disp Refills   • gabapentin (NEURONTIN) 300 MG capsule 90 capsule 3     Sig: Take 1 capsule by mouth 3 (Three) Times a Day.   • furosemide (LASIX) 20 MG tablet 30 tablet 0     Sig: Take 1 tablet by mouth Daily.      Last office visit with prescribing clinician: 10/5/2023   Last telemedicine visit with prescribing clinician: Visit date not found   Next office visit with prescribing clinician: 4/8/2024       Would you like a call back once the refill request has been completed: [] Yes [] No    If the office needs to give you a call back, can they leave a voicemail: [] Yes [] No    Queta Rao MA  12/07/23, 09:26 EST

## 2023-12-07 NOTE — TELEPHONE ENCOUNTER
Rx Refill Note  Requested Prescriptions     Pending Prescriptions Disp Refills    furosemide (LASIX) 20 MG tablet 30 tablet 0     Sig: Take 1 tablet by mouth Daily.    gabapentin (NEURONTIN) 300 MG capsule 90 capsule 3     Sig: Take 1 capsule by mouth 3 (Three) Times a Day.      Last office visit with prescribing clinician: 10/5/2023   Last telemedicine visit with prescribing clinician: Visit date not found   Next office visit with prescribing clinician: 4/8/2024                         Would you like a call back once the refill request has been completed: [] Yes [] No    If the office needs to give you a call back, can they leave a voicemail: [] Yes [] No    Myrtle Brown MA  12/07/23, 09:47 EST

## 2023-12-15 ENCOUNTER — SPECIALTY PHARMACY (OUTPATIENT)
Dept: ENDOCRINOLOGY | Facility: CLINIC | Age: 58
End: 2023-12-15
Payer: MEDICAID

## 2023-12-15 NOTE — PROGRESS NOTES
"Specialty Pharmacy Refill Coordination Note     Marilyn \"Delphine\" is a 58 y.o. female contacted today regarding refills of her specialty medication(s).    Specialty medication(s) and dose(s) confirmed: yes  Changes to medications: no  Changes to insurance: no  Reviewed and verified with patient:         Refill Questions      Flowsheet Row Most Recent Value   Changes to allergies? No   Changes to medications? No   New conditions or infections since last clinic visit No   Unplanned office visit, urgent care, ED, or hospital admission in the last 4 weeks  No   How does patient/caregiver feel medication is working? Good   Financial problems or insurance changes  No   Since the previous refill, were any specialty medication doses or scheduled injections missed or delayed?  No   Does this patient require a clinical escalation to a pharmacist? No            Delivery Questions      Flowsheet Row Most Recent Value   Delivery method  at Pharmacy   Number of medications in delivery 9   Medication(s) being filled and delivered Cetirizine Hcl (Antihistamines - Non-Sedating), Continuous Blood Gluc Sensor, Furosemide, Gabapentin (Anticonvulsants - Misc.), Empagliflozin, Levothyroxine Sodium (Thyroid Hormones), Mirabegron, Pravastatin Sodium, Sodium Bicarbonate (Antacids - Bicarbonate)   Copay verified? Yes   Copay amount 0.00   Copay form of payment No copayment ($0)                 Follow-up: 1 month(s)     Shirley Arellano, Pharmacy Technician  Specialty Pharmacy Technician   12/15/2023   12:00 EST      "

## 2023-12-27 ENCOUNTER — SPECIALTY PHARMACY (OUTPATIENT)
Dept: ENDOCRINOLOGY | Facility: CLINIC | Age: 58
End: 2023-12-27
Payer: MEDICAID

## 2023-12-27 NOTE — PROGRESS NOTES
"Specialty Pharmacy Refill Coordination Note     Marilyn \"Delphine\" is a 58 y.o. female contacted today regarding refills of her specialty medication(s).    Specialty medication(s) and dose(s) confirmed: yes  Changes to medications: no  Changes to insurance: no  Reviewed and verified with patient:         Refill Questions      Flowsheet Row Most Recent Value   Changes to allergies? No   Changes to medications? No   New conditions or infections since last clinic visit No   Unplanned office visit, urgent care, ED, or hospital admission in the last 4 weeks  No   How does patient/caregiver feel medication is working? Good   Financial problems or insurance changes  No   Since the previous refill, were any specialty medication doses or scheduled injections missed or delayed?  No   Does this patient require a clinical escalation to a pharmacist? No            Delivery Questions      Flowsheet Row Most Recent Value   Delivery method  at Pharmacy   Number of medications in delivery 2   Medication(s) being filled and delivered Insulin Lispro, Insulin Glargine   Copay verified? Yes   Copay amount 0.00   Copay form of payment No copayment ($0)                 Follow-up: 2 month(s)     Shirley Arellano, Pharmacy Technician  Specialty Pharmacy Technician   12/27/2023   14:47 EST      "

## 2023-12-29 LAB
ALBUMIN SERPL-MCNC: 3.5 G/DL (ref 3.8–4.9)
ALBUMIN/CREAT UR: 130 MG/G CREAT (ref 0–29)
ALBUMIN/GLOB SERPL: 0.7 {RATIO} (ref 1.2–2.2)
ALP SERPL-CCNC: 103 IU/L (ref 44–121)
ALT SERPL-CCNC: 13 IU/L (ref 0–32)
AMBIG ABBREV CMP14 DEFAULT: NORMAL
AMBIG ABBREV LP DEFAULT: NORMAL
AST SERPL-CCNC: 11 IU/L (ref 0–40)
BILIRUB SERPL-MCNC: 0.4 MG/DL (ref 0–1.2)
BUN SERPL-MCNC: 40 MG/DL (ref 8–27)
BUN/CREAT SERPL: 22 (ref 12–28)
CALCIUM SERPL-MCNC: 9 MG/DL (ref 8.7–10.3)
CHLORIDE SERPL-SCNC: 96 MMOL/L (ref 96–106)
CHOLEST SERPL-MCNC: 143 MG/DL (ref 100–199)
CO2 SERPL-SCNC: 21 MMOL/L (ref 20–29)
CREAT SERPL-MCNC: 1.85 MG/DL (ref 0.57–1)
CREAT UR-MCNC: 39.2 MG/DL
EGFRCR SERPLBLD CKD-EPI 2021: 31 ML/MIN/1.73
GLOBULIN SER CALC-MCNC: 5.1 G/DL (ref 1.5–4.5)
GLUCOSE SERPL-MCNC: 183 MG/DL (ref 70–99)
HBA1C MFR BLD: 8.1 % (ref 4.8–5.6)
HDLC SERPL-MCNC: 27 MG/DL
LDLC SERPL CALC-MCNC: 90 MG/DL (ref 0–99)
MICROALBUMIN UR-MCNC: 51.1 UG/ML
POTASSIUM SERPL-SCNC: 4.2 MMOL/L (ref 3.5–5.2)
PROT SERPL-MCNC: 8.6 G/DL (ref 6–8.5)
SODIUM SERPL-SCNC: 137 MMOL/L (ref 134–144)
T4 FREE SERPL-MCNC: 2.01 NG/DL (ref 0.82–1.77)
TRIGL SERPL-MCNC: 143 MG/DL (ref 0–149)
TSH SERPL DL<=0.005 MIU/L-ACNC: 0.01 UIU/ML (ref 0.45–4.5)
VLDLC SERPL CALC-MCNC: 26 MG/DL (ref 5–40)

## 2023-12-31 RX ORDER — LEVOTHYROXINE SODIUM 0.15 MG/1
150 TABLET ORAL DAILY
Qty: 90 TABLET | Refills: 1 | Status: SHIPPED | OUTPATIENT
Start: 2023-12-31

## 2024-01-05 ENCOUNTER — OFFICE VISIT (OUTPATIENT)
Dept: ENDOCRINOLOGY | Facility: CLINIC | Age: 59
End: 2024-01-05
Payer: MEDICAID

## 2024-01-05 ENCOUNTER — SPECIALTY PHARMACY (OUTPATIENT)
Dept: ENDOCRINOLOGY | Facility: CLINIC | Age: 59
End: 2024-01-05
Payer: MEDICAID

## 2024-01-05 VITALS
WEIGHT: 249 LBS | OXYGEN SATURATION: 99 % | HEIGHT: 61 IN | DIASTOLIC BLOOD PRESSURE: 80 MMHG | SYSTOLIC BLOOD PRESSURE: 130 MMHG | BODY MASS INDEX: 47.01 KG/M2 | HEART RATE: 104 BPM

## 2024-01-05 DIAGNOSIS — E11.65 TYPE 2 DIABETES MELLITUS WITH HYPERGLYCEMIA, WITH LONG-TERM CURRENT USE OF INSULIN: ICD-10-CM

## 2024-01-05 DIAGNOSIS — E11.42 DIABETIC PERIPHERAL NEUROPATHY: ICD-10-CM

## 2024-01-05 DIAGNOSIS — E78.49 OTHER HYPERLIPIDEMIA: ICD-10-CM

## 2024-01-05 DIAGNOSIS — E66.01 MORBID OBESITY: ICD-10-CM

## 2024-01-05 DIAGNOSIS — Z79.4 TYPE 2 DIABETES MELLITUS WITH OTHER SPECIFIED COMPLICATION, WITH LONG-TERM CURRENT USE OF INSULIN: Primary | ICD-10-CM

## 2024-01-05 DIAGNOSIS — N18.32 STAGE 3B CHRONIC KIDNEY DISEASE: ICD-10-CM

## 2024-01-05 DIAGNOSIS — E03.9 HYPOTHYROIDISM, UNSPECIFIED TYPE: ICD-10-CM

## 2024-01-05 DIAGNOSIS — E11.69 TYPE 2 DIABETES MELLITUS WITH OTHER SPECIFIED COMPLICATION, WITH LONG-TERM CURRENT USE OF INSULIN: Primary | ICD-10-CM

## 2024-01-05 DIAGNOSIS — Z79.4 TYPE 2 DIABETES MELLITUS WITH HYPERGLYCEMIA, WITH LONG-TERM CURRENT USE OF INSULIN: ICD-10-CM

## 2024-01-05 RX ORDER — PRAVASTATIN SODIUM 40 MG
40 TABLET ORAL EVERY EVENING
Qty: 30 TABLET | Refills: 11 | Status: SHIPPED | OUTPATIENT
Start: 2024-01-05 | End: 2025-01-04

## 2024-01-05 RX ORDER — BLOOD-GLUCOSE SENSOR
1 EACH MISCELLANEOUS
Qty: 2 EACH | Refills: 5 | Status: SHIPPED | OUTPATIENT
Start: 2024-01-05 | End: 2024-01-05 | Stop reason: CLARIF

## 2024-01-05 RX ORDER — ACYCLOVIR 400 MG/1
1 TABLET ORAL
Qty: 3 EACH | Refills: 5 | Status: SHIPPED | OUTPATIENT
Start: 2024-01-05

## 2024-01-05 RX ORDER — INSULIN LISPRO 100 [IU]/ML
10 INJECTION, SOLUTION INTRAVENOUS; SUBCUTANEOUS
Qty: 15 ML | Refills: 5 | Status: SHIPPED | OUTPATIENT
Start: 2024-01-05

## 2024-01-05 RX ORDER — ACYCLOVIR 400 MG/1
1 TABLET ORAL ONCE
Qty: 1 EACH | Refills: 0 | Status: SHIPPED | OUTPATIENT
Start: 2024-01-05 | End: 2024-01-09

## 2024-01-05 RX ORDER — INSULIN GLARGINE 100 [IU]/ML
27 INJECTION, SOLUTION SUBCUTANEOUS NIGHTLY
Qty: 15 ML | Refills: 5 | Status: SHIPPED | OUTPATIENT
Start: 2024-01-05

## 2024-01-05 RX ORDER — BLOOD-GLUCOSE,RECEIVER,CONT
1 EACH MISCELLANEOUS
Qty: 1 EACH | Refills: 0 | Status: SHIPPED | OUTPATIENT
Start: 2024-01-05 | End: 2024-01-05 | Stop reason: CLARIF

## 2024-01-05 NOTE — PROGRESS NOTES
-----------------------------------------------------------------  ENDOCRINE CLINIC NOTE  -----------------------------------------------------------------        PATIENT NAME: Marilyn Martins  PATIENT : 1965 AGE: 58 y.o.  MRN NUMBER: [id number]  PRIMARY CARE: Sondra Thomas APRN    ==========================================================================    CHIEF COMPLAINT: Type 2 Diabetes Mellitus and Hypothyrodism  DATE OF SERVICE: 24    ==========================================================================    HPI / SUBJECTIVE    58 y.o. female is seen in the clinic today for follow-up of type 2 Diabetes and Hypothyroidism.    Type 2 Diabetes Mellitus:  - Diagnosed with type 2 diabetes around   - A1c of 8.1% on 2023  - Current therapy:  Jardiance 25 mg p.o. daily  Insulin Basaglar 23 units nightly  Insulin lispro 10 units with each meal  Januvia 50 mgs once a day  - Medications not taking:  Ozempic due to abdominal pain and unable to eat for few days  - Patient have underlying history of CKD status post right nephrectomy in 2002  - Checking blood sugars through finger sticks, unable to keep dexcom on and have tried multiple times  - Following in ophthalmology in the clinic, following at [facility]. Last visit was 2023 as per pt.  - Reports to have mild neuropathy in legs.  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     Hypothyroidism:  - Diagnosed with hypothyroidism in   - Current dose is 150 mcg daily changed on 2024 for iatrogenic hyperthyroidism.    ==========================================================================                                                PAST MEDICAL HISTORY    Past Medical History:   Diagnosis Date    Allergic     Anemia     Anxiety     Arthritis     Asthma     Callus     Cataract     CHF (congestive heart failure)     Cholelithiasis      Gall bladder removed    Chronic constipation     Chronic diarrhea     COPD (chronic obstructive pulmonary disease) 2003    Depression     Diabetes mellitus     Diabetes mellitus     Difficulty walking     Unable to walk due to left leg amputated    Diverticulosis 2022    GERD (gastroesophageal reflux disease) 2005    Headache     Heart murmur 2007    Sometimes heard    History of transfusion     Last done on Dec 8th 2022 for right kidney removel    HL (hearing loss) 2012    Left ear drum is busted, hole in right ear, get excess of build up in both ears    Hyperlipidemia 2000    Hypothyroidism 1986    Uncontroled    Kidney stone 2022    Left side two removed by lazer surgery,right kidney removed Dec.8th of this year.    Low back pain 2001    Neuropathy in diabetes     Obesity 1989    Pneumonia 2022 March of this year    Renal insufficiency 2016    Shortness of breath     Sinusitis     Substance abuse     Urinary tract infection     Visual impairment 1985       ==========================================================================    PAST SURGICAL HISTORY    Past Surgical History:   Procedure Laterality Date    ABOVE KNEE AMPUTATION Left 02/2000    ANKLE OPEN REDUCTION INTERNAL FIXATION      Before on left ankle    CHOLECYSTECTOMY  2002    COLONOSCOPY  2004    EYE SURGERY  2014    Carrects    FOOT SURGERY      Had broken left ankle before amputation    HERNIA REPAIR      HYSTERECTOMY      JOINT REPLACEMENT      SUBTOTAL HYSTERECTOMY      TOENAIL EXCISION      Right big toe nail       ==========================================================================    FAMILY HISTORY    Family History   Problem Relation Age of Onset    Anxiety disorder Daughter     Developmental Disability Daughter     Diabetes Daughter         Pre diabetic with first child    Learning disabilities Daughter     Mental illness Daughter         Cutter, anything to harm herself, hanging, burning etc.    Anxiety disorder Daughter      Developmental Disability Daughter     Diabetes Daughter     Learning disabilities Daughter     Mental illness Daughter         Cutters    Developmental Disability Son     Learning disabilities Son     Diabetes Mother        ==========================================================================    SOCIAL HISTORY    Social History     Socioeconomic History    Marital status:     Number of children: 3    Years of education: 12   Tobacco Use    Smoking status: Never    Smokeless tobacco: Never   Vaping Use    Vaping Use: Never used   Substance and Sexual Activity    Alcohol use: Yes     Comment: Wine coolers and sleeping pills together 1996    Drug use: Never    Sexual activity: Not Currently     Partners: Male     Birth control/protection: Hysterectomy       ==========================================================================    MEDICATIONS      Current Outpatient Medications:     Insulin Glargine (Lantus SoloStar) 100 UNIT/ML injection pen, Inject 27 Units under the skin into the appropriate area as directed Every Night., Disp: 15 mL, Rfl: 5    Insulin Lispro, 1 Unit Dial, (HumaLOG KwikPen) 100 UNIT/ML solution pen-injector, Inject 10 Units under the skin into the appropriate area as directed 3 (Three) Times a Day With Meals., Disp: 15 mL, Rfl: 5    Accu-Chek Softclix Lancets lancets, Use as instructed to check blood sugar before meals and at bedtime, Disp: 100 each, Rfl: 12    albuterol sulfate  (90 Base) MCG/ACT inhaler, Inhale 2 puffs every 6 hours as needed for wheezing and shortness of breath, Disp: 18 g, Rfl: 1    Blood Glucose Monitoring Suppl (Accu-Chek Guide) w/Device kit, 1 each 4 (Four) Times a Day After Meals & at Bedtime., Disp: 1 kit, Rfl: 0    cetirizine (Allergy Relief Cetirizine) 10 MG tablet, Take 1 tablet by mouth Daily., Disp: 30 tablet, Rfl: 3    Continuous Blood Gluc  (FreeStyle Shemar 3 [place]) device, Use 1 each 4 (Four) Times a Day Before Meals & at Bedtime.,  "Disp: 1 each, Rfl: 0    Continuous Blood Gluc Sensor (FreeStyle Shemar 3 Sensor) misc, Use 1 each 4 (Four) Times a Day Before Meals & at Bedtime., Disp: 2 each, Rfl: 5    ferrous sulfate 324 (65 Fe) MG tablet delayed-release EC tablet, Take 1 tablet by mouth Daily With Breakfast., Disp: , Rfl:     furosemide (LASIX) 20 MG tablet, Take 1 tablet by mouth Daily., Disp: 30 tablet, Rfl: 6    gabapentin (NEURONTIN) 300 MG capsule, Take 1 capsule by mouth 3 (Three) Times a Day., Disp: 90 capsule, Rfl: 3    glucose blood (Accu-Chek Guide) test strip, Use as instructed to test blood sugar 4 times daily, Disp: 200 each, Rfl: 5    Insulin Pen Needle 32G X 4 MM misc, 1 each 4 (Four) Times a Day Before Meals & at Bedtime., Disp: 200 each, Rfl: 5    Jardiance 25 MG tablet tablet, Take 1 tablet by mouth Every Morning., Disp: 90 tablet, Rfl: 1    levoFLOXacin (LEVAQUIN) 250 MG tablet, Take 1 tablet by mouth Daily., Disp: 5 tablet, Rfl: 0    levothyroxine (Synthroid) 150 MCG tablet, Take 1 tablet by mouth Daily., Disp: 90 tablet, Rfl: 1    Myrbetriq 25 MG tablet sustained-release 24 hour 24 hr tablet, Take 1 tablet by mouth Daily., Disp: 30 tablet, Rfl: 6    Omega-3 Fatty Acids (fish oil) 1000 MG capsule capsule, Take  by mouth., Disp: , Rfl:     pravastatin (Pravachol) 40 MG tablet, Take 1 tablet by mouth Every Evening., Disp: 30 tablet, Rfl: 11    RELION INSULIN SYR 0.5ML/31G 31G X 5/16\" 0.5 ML misc, , Disp: , Rfl:     SITagliptin (Januvia) 50 MG tablet, Take 1 tablet by mouth Daily., Disp: 90 tablet, Rfl: 3    sodium bicarbonate 650 MG tablet, Take 1 tablet by mouth 3 (Three) Times a Day., Disp: 90 tablet, Rfl: 2    vitamin B-12 (CYANOCOBALAMIN) 100 MCG tablet, Take 0.5 tablets by mouth Daily., Disp: , Rfl:     vitamin C (ASCORBIC ACID) 250 MG tablet, Take 1 tablet by mouth Daily., Disp: , Rfl:     vitamin E 100 UNIT capsule, Take 1 capsule by mouth Daily., Disp: , Rfl: "     ==========================================================================    ALLERGIES    Allergies   Allergen Reactions    Latex Other (See Comments)    Morphine And Related Rash    Cephalexin Diarrhea and Nausea And Vomiting     Extreme vomiting, can't keep food or water down either     Codeine Other (See Comments)    Sulfa Antibiotics Other (See Comments)    Povidone Iodine Rash       ==========================================================================    OBJECTIVE    Vitals:    01/05/24 0854   BP: 130/80   Pulse: 104   SpO2: 99%     Body mass index is 47.08 kg/m².     General: Alert, cooperative, no acute distress  Thyroid:  no enlargement/tenderness/palpable nodules  Lungs: Clear to auscultation bilaterally, respirations unlabored  Heart: Regular rate and rhythm, S1 and S2 normal, no murmur, rub or gallop  Abdomen: Soft, NT, ND and Bowel sounds Positive    ==========================================================================    LAB EVALUATION    Lab Results   Component Value Date    GLUCOSE 183 (H) 12/28/2023    BUN 40 (H) 12/28/2023    CREATININE 1.85 (H) 12/28/2023    EGFRIFAFRI 32 (L) 12/09/2022    BCR 22 12/28/2023    K 4.2 12/28/2023    CO2 21 12/28/2023    CALCIUM 9.0 12/28/2023    PROTENTOTREF 8.6 (H) 12/28/2023    ALBUMIN 3.5 (L) 12/28/2023    LABIL2 0.7 (L) 12/28/2023    AST 11 12/28/2023    ALT 13 12/28/2023    CHOL 166 07/11/2023    TRIG 143 12/28/2023    HDL 27 (L) 12/28/2023    LDL 90 12/28/2023     Lab Results   Component Value Date    HGBA1C 8.1 (H) 12/28/2023    HGBA1C 7.2 (H) 09/27/2023    HGBA1C 8.10 (H) 07/11/2023     Lab Results   Component Value Date    MICROALBUR 51.1 12/28/2023    CREATININE 1.85 (H) 12/28/2023     Lab Results   Component Value Date    TSH 0.011 (L) 12/28/2023    FREET4 2.01 (H) 12/28/2023       ==========================================================================    ASSESSMENT AND PLAN    Assessment:  #Type 2 diabetes with  "hyperglycemia  #Hypothyroidism with iatrogenic hyperthyroidism  #Hyperlipidemia  #Morbid obesity with BMI 47.08  #Peripheral neuropathy secondary to diabetes type 2  #CKD stage III with microalbuminuria, worsening, following nephrology as outpatient with Dr. Mccauley    Plan:  - Reviewed blood glucose levels and still have fasting hyperglycemia, target is to maintain A1c around 7% without hypoglycemia  - Will adjust insulin therapy  - Patient new adjusted therapies:  Jardiance 25 mg p.o. daily, known to have hx of chronic UTI but no changes in UTI pattern with start of jardiance  Insulin Basaglar 27 units nightly  Insulin lispro 10 units with each meal  Januvia 50 mgs once a day  - Continue blood sugars with finger sticks  - Regarding hypothyroidism patient again have evidence of iatrogenic hyperthyroidism and therefore we will decrease the dose of levothyroxine from 175 to 150 mcg and now on new dosing, repeat labs in 6 weeks and follow up in 3 months  - Increase pravastatin therapy for 40 mgs once a day  - Patient have previously tried Dexcom multiple times but was not able to keep it on and will benefit from CGM therapy, will try to get coverage for freestyle mitzi    Thank you for courtesy of consultation.    Return to clinic: 3 months    Entire assessment and plan was discussed and counseled the patient in detail to which patient verbalized understanding and agreed with care.  Answered all queries and concerns.    This note was created using voice recognition software and is inherently subject to errors including those of syntax and \"sound-alike\" substitutions which may escape proofreading.  In such instances, original meaning may be extrapolated by contextual derivation.    Note: Portions of this note may have been copied from previous notes but documentation have been reviewed and edited as necessary to support clinical decision making for today's " visit.    ==========================================================================    INFORMATION PROVIDED TO PATIENT    Patient Instructions   Please,     - Continue Jardiance 25 mgs once a day  - Increase nighttime insulin with Basaglar to 27 units every night.  - Use mealtime insulin 10 units with each meal with no correction scale.  Mealtime insulin needs to be taking 15 minutes before each meal.  Please check your blood sugar before giving mealtime insulin.  - Continue Januvia 50 mg 1 pill by mouth daily.    -Continue to check your blood sugar 4 times a day through fingersticks: Fasting, before lunch, before dinner and before bedtime.  I will also order freestyle mitzi for you since you are not able to use Dexcom system.  Will wait for insurance to cover for freestyle in the meantime continue fingersticks.     If your blood sugar is running less than 100 and fasting then decrease your nighttime insulin by 3 units.    If your blood sugar before lunch, before dinner and before bedtime snack is less than 100 then decrease your mealtime insulin by 2 units.     Increase your pravastatin therapy to 40 mg 1 pill by mouth daily.     For levothyroxine continue 150 mcg 1 pill by mouth early in the morning at fasting state and maintain fasting for at least 40 minutes.     Repeat blood work for thyroid in 6 weeks time otherwise repeat nonfasting blood work before next visit in 3 months time.    Lease continue to follow-up with your nephrologist for persistent kidney problems, maintain hydration as per nephrology team.    Thank you for your visit today.     If you have any questions or concerns please feel free to reach out of the office.      ==========================================================================  Ray Narvaez MD  Department of Endocrine, Diabetes and Metabolism  [facility] [place], [place]  ==========================================================================

## 2024-01-05 NOTE — PROGRESS NOTES
Specialty Pharmacy Patient Management Program       Marilyn Martins is a 58 y.o. female seen by an Endocrinology provider for Type 2 Diabetes and enrolled in the Endocrinology Patient Management program offered by [facility].      Provider sent in prescription for freestyle mitzi CGM since patient has had problems with dexcom 6 falling off early consistently despite changing location and getting covers for the sensors. FSL required a PA, which was submitted and denied by the insurance. Per insurance, patient must try and fail therapy    Requested Prescriptions     Pending Prescriptions Disp Refills    Continuous Blood Gluc Sensor (Dexcom G7 Sensor) misc 3 each 5     Sig: Use 1 each Every 10 (Ten) Days.    Continuous Blood Gluc  (Dexcom G7 ) device 1 each 0     Sig: Use 1 each 1 (One) Time for 1 dose.       Refill sent in to patient's pharmacy for above medication prescribed by Dr. Narvaez.   Last office visit 1/5/24.  Next visit scheduled 4/5/24.    Mattie Chen, PharmD  Clinical Specialty Pharmacist, Endocrinology  1/5/2024  13:46 EST

## 2024-01-05 NOTE — PATIENT INSTRUCTIONS
Please,     - Continue Jardiance 25 mgs once a day  - Increase nighttime insulin with Basaglar to 27 units every night.  - Use mealtime insulin 10 units with each meal with no correction scale.  Mealtime insulin needs to be taking 15 minutes before each meal.  Please check your blood sugar before giving mealtime insulin.  - Continue Januvia 50 mg 1 pill by mouth daily.    -Continue to check your blood sugar 4 times a day through fingersticks: Fasting, before lunch, before dinner and before bedtime.  I will also order freestyle mitzi for you since you are not able to use Dexcom system.  Will wait for insurance to cover for freestyle in the meantime continue fingersticks.     If your blood sugar is running less than 100 and fasting then decrease your nighttime insulin by 3 units.    If your blood sugar before lunch, before dinner and before bedtime snack is less than 100 then decrease your mealtime insulin by 2 units.     Increase your pravastatin therapy to 40 mg 1 pill by mouth daily.     For levothyroxine continue 150 mcg 1 pill by mouth early in the morning at fasting state and maintain fasting for at least 40 minutes.     Repeat blood work for thyroid in 6 weeks time otherwise repeat nonfasting blood work before next visit in 3 months time.    Lease continue to follow-up with your nephrologist for persistent kidney problems, maintain hydration as per nephrology team.    Thank you for your visit today.     If you have any questions or concerns please feel free to reach out of the office.

## 2024-01-18 RX ORDER — CETIRIZINE HYDROCHLORIDE 10 MG/1
10 TABLET ORAL DAILY
Qty: 30 TABLET | Refills: 3 | Status: SHIPPED | OUTPATIENT
Start: 2024-01-18

## 2024-01-22 ENCOUNTER — SPECIALTY PHARMACY (OUTPATIENT)
Dept: ENDOCRINOLOGY | Facility: CLINIC | Age: 59
End: 2024-01-22
Payer: MEDICAID

## 2024-01-22 NOTE — PROGRESS NOTES
"Specialty Pharmacy Refill Coordination Note     Marilyn \"Delphine\" is a 58 y.o. female contacted today regarding refills of her specialty medication(s).    Specialty medication(s) and dose(s) confirmed: yes  Changes to medications: no  Changes to insurance: no  Reviewed and verified with patient:         Refill Questions      Flowsheet Row Most Recent Value   Changes to allergies? No   Changes to medications? No   New conditions or infections since last clinic visit No   Unplanned office visit, urgent care, ED, or hospital admission in the last 4 weeks  No   How does patient/caregiver feel medication is working? Good   Financial problems or insurance changes  No   Since the previous refill, were any specialty medication doses or scheduled injections missed or delayed?  No   Does this patient require a clinical escalation to a pharmacist? No            Delivery Questions      Flowsheet Row Most Recent Value   Delivery method  at Pharmacy   Number of medications in delivery 8   Medication(s) being filled and delivered Glucose Blood, Lancets, Insulin Pen Needle, Cetirizine Hcl (Antihistamines - Non-Sedating), Furosemide, Gabapentin (Anticonvulsants - Misc.), Mirabegron, Sodium Bicarbonate (Antacids - Bicarbonate)   Copay verified? Yes   Copay amount 0   Copay form of payment No copayment ($0)                 Follow-up: 1  month(s)     Shirley Arellano, Pharmacy Technician  Specialty Pharmacy Technician   1/22/2024   14:21 EST      "

## 2024-02-09 ENCOUNTER — SPECIALTY PHARMACY (OUTPATIENT)
Dept: ENDOCRINOLOGY | Facility: CLINIC | Age: 59
End: 2024-02-09
Payer: MEDICAID

## 2024-02-09 NOTE — PROGRESS NOTES
"Specialty Pharmacy Refill Coordination Note     Marilyn \"Delphine\" is a 58 y.o. female contacted today regarding refills of her specialty medication(s).    Specialty medication(s) and dose(s) confirmed: yes  Changes to medications: no  Changes to insurance: no  Reviewed and verified with patient:         Refill Questions      Flowsheet Row Most Recent Value   Changes to allergies? No   Changes to medications? No   New conditions or infections since last clinic visit No   Unplanned office visit, urgent care, ED, or hospital admission in the last 4 weeks  No   How does patient/caregiver feel medication is working? Good   Financial problems or insurance changes  No   Since the previous refill, were any specialty medication doses or scheduled injections missed or delayed?  No   Does this patient require a clinical escalation to a pharmacist? No            Delivery Questions      Flowsheet Row Most Recent Value   Delivery method  at Pharmacy   Number of medications in delivery 2   Medication(s) being filled and delivered Continuous Blood Gluc Sensor, Pravastatin Sodium   Copay verified? Yes   Copay amount 0   Copay form of payment No copayment ($0)                 Follow-up: 1 month(s)     Shirley Arellano, Pharmacy Technician  Specialty Pharmacy Technician   2/9/2024   14:33 EST      "

## 2024-02-15 DIAGNOSIS — R35.1 FREQUENT URINATION AT NIGHT: ICD-10-CM

## 2024-02-15 RX ORDER — MIRABEGRON 25 MG/1
25 TABLET, FILM COATED, EXTENDED RELEASE ORAL DAILY
Qty: 30 TABLET | Refills: 0 | Status: SHIPPED | OUTPATIENT
Start: 2024-02-15

## 2024-02-20 ENCOUNTER — SPECIALTY PHARMACY (OUTPATIENT)
Dept: ENDOCRINOLOGY | Facility: CLINIC | Age: 59
End: 2024-02-20
Payer: MEDICAID

## 2024-02-20 NOTE — PROGRESS NOTES
"Specialty Pharmacy Refill Coordination Note     Marilyn \"Delphine\" is a 58 y.o. female contacted today regarding refills of her specialty medication(s).    Specialty medication(s) and dose(s) confirmed: yes  Changes to medications: no  Changes to insurance: no  Reviewed and verified with patient:         Refill Questions      Flowsheet Row Most Recent Value   Changes to allergies? No   Changes to medications? No   New conditions or infections since last clinic visit No   Unplanned office visit, urgent care, ED, or hospital admission in the last 4 weeks  No   How does patient/caregiver feel medication is working? Good   Financial problems or insurance changes  No   Since the previous refill, were any specialty medication doses or scheduled injections missed or delayed?  No   Does this patient require a clinical escalation to a pharmacist? No            Delivery Questions      Flowsheet Row Most Recent Value   Delivery method  at Pharmacy   Number of medications in delivery 7   Medication(s) being filled and delivered Mirabegron, Insulin Glargine, Insulin Lispro, Cetirizine Hcl (Antihistamines - Non-Sedating), Furosemide, Gabapentin (Anticonvulsants - Misc.), Sodium Bicarbonate (Antacids - Bicarbonate)   Copay verified? Yes   Copay amount 0   Copay form of payment No copayment ($0)                 Follow-up: 1 month(s)     Shirley Arellano, Pharmacy Technician  Specialty Pharmacy Technician   2/20/2024   09:01 EST      "

## 2024-02-21 LAB
T4 FREE SERPL-MCNC: 1.64 NG/DL (ref 0.82–1.77)
TSH SERPL DL<=0.005 MIU/L-ACNC: 0.13 UIU/ML (ref 0.45–4.5)

## 2024-03-08 DIAGNOSIS — R35.1 FREQUENT URINATION AT NIGHT: ICD-10-CM

## 2024-03-08 RX ORDER — MIRABEGRON 25 MG/1
25 TABLET, FILM COATED, EXTENDED RELEASE ORAL DAILY
Qty: 30 TABLET | Refills: 0 | Status: SHIPPED | OUTPATIENT
Start: 2024-03-08

## 2024-03-11 ENCOUNTER — SPECIALTY PHARMACY (OUTPATIENT)
Dept: ENDOCRINOLOGY | Facility: CLINIC | Age: 59
End: 2024-03-11
Payer: MEDICAID

## 2024-03-11 NOTE — PROGRESS NOTES
"Specialty Pharmacy Refill Coordination Note     Marilyn \"Delphine\" is a 58 y.o. female contacted today regarding refills of her specialty medication(s).    Specialty medication(s) and dose(s) confirmed: yes  Changes to medications: no  Changes to insurance: no  Reviewed and verified with patient:         Refill Questions      Flowsheet Row Most Recent Value   Changes to allergies? No   Changes to medications? No   New conditions or infections since last clinic visit No   Unplanned office visit, urgent care, ED, or hospital admission in the last 4 weeks  No   How does patient/caregiver feel medication is working? Good   Financial problems or insurance changes  No   Since the previous refill, were any specialty medication doses or scheduled injections missed or delayed?  No   Does this patient require a clinical escalation to a pharmacist? No            Delivery Questions      Flowsheet Row Most Recent Value   Delivery method  at Pharmacy   Number of medications in delivery 7   Medication(s) being filled and delivered Glucose Blood, Insulin Pen Needle, Continuous Blood Gluc Sensor, Sitagliptin Phosphate, Empagliflozin, Pravastatin Sodium   Copay verified? Yes   Copay amount 0   Copay form of payment No copayment ($0)                 Follow-up: 1 month(s)     Shirley Arellano, Pharmacy Technician  Specialty Pharmacy Technician   3/11/2024   09:10 EDT      "

## 2024-03-18 ENCOUNTER — SPECIALTY PHARMACY (OUTPATIENT)
Dept: ENDOCRINOLOGY | Facility: CLINIC | Age: 59
End: 2024-03-18
Payer: MEDICAID

## 2024-03-18 NOTE — PROGRESS NOTES
"Specialty Pharmacy Refill Coordination Note     Marilyn \"Delphine\" is a 58 y.o. female contacted today regarding refills of her specialty medication(s).    Specialty medication(s) and dose(s) confirmed: yes  Changes to medications: no  Changes to insurance: no  Reviewed and verified with patient:         Refill Questions      Flowsheet Row Most Recent Value   Changes to allergies? No   Changes to medications? No   New conditions or infections since last clinic visit No   Unplanned office visit, urgent care, ED, or hospital admission in the last 4 weeks  No   How does patient/caregiver feel medication is working? Good   Financial problems or insurance changes  No   Since the previous refill, were any specialty medication doses or scheduled injections missed or delayed?  No   Does this patient require a clinical escalation to a pharmacist? No            Delivery Questions      Flowsheet Row Most Recent Value   Delivery method  at Pharmacy   Number of medications in delivery 5   Medication(s) being filled and delivered Cetirizine Hcl (Antihistamines - Non-Sedating), Furosemide, Gabapentin (Anticonvulsants - Misc.), Mirabegron, Sodium Bicarbonate (Antacids - Bicarbonate)   Copay verified? Yes   Copay amount 0   Copay form of payment No copayment ($0)                 Follow-up: 1 month(s)     Shirley Arellano, Pharmacy Technician  Specialty Pharmacy Technician   3/18/2024   08:39 EDT      "

## 2024-03-28 ENCOUNTER — TRANSCRIBE ORDERS (OUTPATIENT)
Dept: ADMINISTRATIVE | Facility: HOSPITAL | Age: 59
End: 2024-03-28
Payer: MEDICAID

## 2024-03-28 DIAGNOSIS — N13.30 HYDRONEPHROSIS, UNSPECIFIED HYDRONEPHROSIS TYPE: Primary | ICD-10-CM

## 2024-03-28 LAB
ALBUMIN SERPL-MCNC: 3.6 G/DL (ref 3.8–4.9)
ALBUMIN/GLOB SERPL: 0.8 {RATIO} (ref 1.2–2.2)
ALP SERPL-CCNC: 117 IU/L (ref 44–121)
ALT SERPL-CCNC: 12 IU/L (ref 0–32)
AMBIG ABBREV CMP14 DEFAULT: NORMAL
AST SERPL-CCNC: 17 IU/L (ref 0–40)
BILIRUB SERPL-MCNC: <0.2 MG/DL (ref 0–1.2)
BUN SERPL-MCNC: 31 MG/DL (ref 8–27)
BUN/CREAT SERPL: 18 (ref 12–28)
CALCIUM SERPL-MCNC: 9.4 MG/DL (ref 8.7–10.3)
CHLORIDE SERPL-SCNC: 98 MMOL/L (ref 96–106)
CO2 SERPL-SCNC: 25 MMOL/L (ref 20–29)
CREAT SERPL-MCNC: 1.76 MG/DL (ref 0.57–1)
EGFRCR SERPLBLD CKD-EPI 2021: 33 ML/MIN/1.73
GLOBULIN SER CALC-MCNC: 4.8 G/DL (ref 1.5–4.5)
GLUCOSE SERPL-MCNC: 131 MG/DL (ref 70–99)
HBA1C MFR BLD: 7.9 % (ref 4.8–5.6)
POTASSIUM SERPL-SCNC: 5 MMOL/L (ref 3.5–5.2)
PROT SERPL-MCNC: 8.4 G/DL (ref 6–8.5)
SODIUM SERPL-SCNC: 137 MMOL/L (ref 134–144)
T4 FREE SERPL-MCNC: 1.43 NG/DL (ref 0.82–1.77)
TSH SERPL DL<=0.005 MIU/L-ACNC: 0.44 UIU/ML (ref 0.45–4.5)

## 2024-03-29 ENCOUNTER — SPECIALTY PHARMACY (OUTPATIENT)
Dept: ENDOCRINOLOGY | Facility: CLINIC | Age: 59
End: 2024-03-29
Payer: MEDICAID

## 2024-03-29 NOTE — PROGRESS NOTES
"Specialty Pharmacy Refill Coordination Note     Marilyn \"Delphine\" is a 58 y.o. female contacted today regarding refills of her specialty medication(s).    Specialty medication(s) and dose(s) confirmed: yes  Changes to medications: no  Changes to insurance: no  Reviewed and verified with patient:         Refill Questions      Flowsheet Row Most Recent Value   Changes to allergies? No   Changes to medications? No   New conditions or infections since last clinic visit No   Unplanned office visit, urgent care, ED, or hospital admission in the last 4 weeks  No   How does patient/caregiver feel medication is working? Good   Financial problems or insurance changes  No   Since the previous refill, were any specialty medication doses or scheduled injections missed or delayed?  No   Does this patient require a clinical escalation to a pharmacist? No            Delivery Questions      Flowsheet Row Most Recent Value   Delivery method  at Pharmacy   Number of medications in delivery 1   Medication(s) being filled and delivered Levothyroxine Sodium (Thyroid Hormones)   Copay verified? Yes   Copay amount 0   Copay form of payment No copayment ($0)                 Follow-up: 1 month(s)     Shirley Arellano, Pharmacy Technician  Specialty Pharmacy Technician   3/29/2024   13:45 EDT      "

## 2024-04-03 ENCOUNTER — SPECIALTY PHARMACY (OUTPATIENT)
Dept: ENDOCRINOLOGY | Facility: CLINIC | Age: 59
End: 2024-04-03
Payer: MEDICAID

## 2024-04-05 ENCOUNTER — SPECIALTY PHARMACY (OUTPATIENT)
Dept: ENDOCRINOLOGY | Facility: CLINIC | Age: 59
End: 2024-04-05
Payer: MEDICAID

## 2024-04-05 ENCOUNTER — OFFICE VISIT (OUTPATIENT)
Dept: ENDOCRINOLOGY | Facility: CLINIC | Age: 59
End: 2024-04-05
Payer: MEDICAID

## 2024-04-05 VITALS
BODY MASS INDEX: 48.71 KG/M2 | DIASTOLIC BLOOD PRESSURE: 78 MMHG | WEIGHT: 258 LBS | HEIGHT: 61 IN | SYSTOLIC BLOOD PRESSURE: 130 MMHG | OXYGEN SATURATION: 98 % | HEART RATE: 140 BPM

## 2024-04-05 DIAGNOSIS — E66.01 MORBID OBESITY: ICD-10-CM

## 2024-04-05 DIAGNOSIS — E78.49 OTHER HYPERLIPIDEMIA: ICD-10-CM

## 2024-04-05 DIAGNOSIS — E11.42 DIABETIC PERIPHERAL NEUROPATHY: ICD-10-CM

## 2024-04-05 DIAGNOSIS — N18.32 STAGE 3B CHRONIC KIDNEY DISEASE: ICD-10-CM

## 2024-04-05 DIAGNOSIS — E03.9 HYPOTHYROIDISM, UNSPECIFIED TYPE: ICD-10-CM

## 2024-04-05 DIAGNOSIS — E11.65 TYPE 2 DIABETES MELLITUS WITH HYPERGLYCEMIA, WITH LONG-TERM CURRENT USE OF INSULIN: Primary | ICD-10-CM

## 2024-04-05 DIAGNOSIS — Z79.4 TYPE 2 DIABETES MELLITUS WITH HYPERGLYCEMIA, WITH LONG-TERM CURRENT USE OF INSULIN: Primary | ICD-10-CM

## 2024-04-05 RX ORDER — ACYCLOVIR 400 MG/1
1 TABLET ORAL
Qty: 9 EACH | Refills: 3 | Status: SHIPPED | OUTPATIENT
Start: 2024-04-05

## 2024-04-05 RX ORDER — LEVOTHYROXINE SODIUM 112 UG/1
112 TABLET ORAL DAILY
Qty: 90 TABLET | Refills: 1 | Status: SHIPPED | OUTPATIENT
Start: 2024-04-05 | End: 2024-10-02

## 2024-04-05 RX ORDER — INSULIN GLARGINE 100 [IU]/ML
32 INJECTION, SOLUTION SUBCUTANEOUS NIGHTLY
Qty: 15 ML | Refills: 5 | Status: SHIPPED | OUTPATIENT
Start: 2024-04-05

## 2024-04-05 RX ORDER — PRAVASTATIN SODIUM 20 MG
20 TABLET ORAL EVERY EVENING
Qty: 90 TABLET | Refills: 3 | Status: SHIPPED | OUTPATIENT
Start: 2024-04-05 | End: 2025-04-05

## 2024-04-05 NOTE — PROGRESS NOTES
Specialty Pharmacy Patient Management Program  Endocrinology Reassessment     Marilyn Martins was referred by an Endocrinology provider to the Endocrinology Patient Management program offered by [facility] for Type 2 Diabetes. A follow-up outreach was conducted, including assessment of continued therapy appropriateness, medication adherence, and side effect incidence and management for Januvia and Jardiance.    Changes to Insurance Coverage or Financial Support  No changes    Relevant Past Medical History and Comorbidities  Relevant medical history and concomitant health conditions were discussed with the patient. The patient's chart has been reviewed for relevant past medical history and comorbid health conditions and updated as necessary.   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    Low back pain 2001    Neuropathy in diabetes     Obesity 1989    Pneumonia 2022 March of this year    Renal insufficiency 2016    Shortness of breath     Sinusitis     Substance abuse     Urinary tract infection     Visual impairment 1985     Social  History     Socioeconomic History    Marital status:     Number of children: 3    Years of education: 12   Tobacco Use    Smoking status: Never    Smokeless tobacco: Never   Vaping Use    Vaping status: Never Used   Substance and Sexual Activity    Alcohol use: Yes     Comment: Wine coolers and sleeping pills together 1996    Drug use: Never    Sexual activity: Not Currently     Partners: Male     Birth control/protection: Hysterectomy     Problem list reviewed by Yumiko Ching PharmD on 4/5/2024 at  2:26 PM    Hospitalizations and Urgent Care Since Last Assessment  ED Visits, Admissions, or Hospitalizations: None  Urgent Office Visits: None    Allergies  Known allergies and reactions were discussed with the patient. The patient's chart has been reviewed for allergy information and updated as necessary.   Allergies   Allergen Reactions    Latex Other (See Comments)    Morphine And Related Rash    Cephalexin Diarrhea and Nausea And Vomiting     Extreme vomiting, can't keep food or water down either     Codeine Other (See Comments)    Sulfa Antibiotics Other (See Comments)    Povidone Iodine Rash     Allergies reviewed by Yumiko Ching PharmD on 4/5/2024 at  2:24 PM    Relevant Laboratory Values  Relevant laboratory values were discussed with the patient. The following specialty medication dose adjustment(s) are recommended: No dosage adjustments currently recommended  A1C Last 3 Results          9/27/2023    13:26 12/28/2023    08:14 3/27/2024    14:01   HGBA1C Last 3 Results   Hemoglobin A1C 7.2  8.1  7.9      Lab Results   Component Value Date    HGBA1C 7.9 (H) 03/27/2024     Lab Results   Component Value Date    GLUCOSE 131 (H) 03/27/2024    CALCIUM 9.4 03/27/2024     03/27/2024    K 5.0 03/27/2024    CO2 25 03/27/2024    CL 98 03/27/2024    BUN 31 (H) 03/27/2024    CREATININE 1.76 (H) 03/27/2024    EGFRIFAFRI 32 (L) 12/09/2022    BCR 18 03/27/2024    ANIONGAP 13.2 04/10/2023     Lab Results    Component Value Date    CHOL 166 07/11/2023    CHLPL 143 12/28/2023    TRIG 143 12/28/2023    HDL 27 (L) 12/28/2023    LDL 90 12/28/2023     Microalbumin          7/11/2023    10:37 12/28/2023    08:14   Microalbumin   Microalbumin, Urine 2.3  51.1      Current Medication List  This medication list has been reviewed with the patient and evaluated for any interactions or necessary modifications/recommendations, and updated to include all prescription medications, OTC medications, and supplements the patient is currently taking.  This list reflects what is contained in the patient's profile, which has also been marked as reviewed to communicate to other providers it is the most up to date version of the patient's current medication therapy.     Current Outpatient Medications:     Accu-Chek Softclix Lancets lancets, Use as instructed to check blood sugar before meals and at bedtime, Disp: 100 each, Rfl: 12    albuterol sulfate  (90 Base) MCG/ACT inhaler, Inhale 2 puffs every 6 hours as needed for wheezing and shortness of breath, Disp: 18 g, Rfl: 1    BIOTIN PO, Take 1 tablet by mouth Daily., Disp: , Rfl:     Blood Glucose Monitoring Suppl (Accu-Chek Guide) w/Device kit, 1 each 4 (Four) Times a Day After Meals & at Bedtime., Disp: 1 kit, Rfl: 0    cetirizine (Allergy Relief Cetirizine) 10 MG tablet, Take 1 tablet by mouth Daily., Disp: 30 tablet, Rfl: 3    Cholecalciferol (VITAMIN D-3 PO), Take 1 tablet by mouth Daily., Disp: , Rfl:     Continuous Blood Gluc Sensor (Dexcom G7 Sensor) misc, Use 1 each Every 10 (Ten) Days., Disp: 9 each, Rfl: 3    ferrous sulfate 324 (65 Fe) MG tablet delayed-release EC tablet, Take 1 tablet by mouth Daily With Breakfast., Disp: , Rfl:     furosemide (LASIX) 20 MG tablet, Take 1 tablet by mouth Daily., Disp: 30 tablet, Rfl: 6    gabapentin (NEURONTIN) 300 MG capsule, Take 1 capsule by mouth 3 (Three) Times a Day., Disp: 90 capsule, Rfl: 3    glucose blood (Accu-Chek Guide) test  "strip, Use as instructed to test blood sugar 4 times daily, Disp: 200 each, Rfl: 5    Insulin Glargine (Lantus SoloStar) 100 UNIT/ML injection pen, Inject 32 Units under the skin into the appropriate area as directed Every Night., Disp: 15 mL, Rfl: 5    Insulin Lispro, 1 Unit Dial, (HumaLOG KwikPen) 100 UNIT/ML solution pen-injector, Inject 10 Units under the skin into the appropriate area as directed 3 (Three) Times a Day With Meals., Disp: 15 mL, Rfl: 5    Insulin Pen Needle 32G X 4 MM misc, 1 each 4 (Four) Times a Day Before Meals & at Bedtime., Disp: 200 each, Rfl: 5    Jardiance 25 MG tablet tablet, Take 1 tablet by mouth Every Morning., Disp: 90 tablet, Rfl: 1    levoFLOXacin (LEVAQUIN) 250 MG tablet, Take 1 tablet by mouth Daily. (Patient not taking: Reported on 4/5/2024), Disp: 5 tablet, Rfl: 0    levothyroxine (Synthroid) 112 MCG tablet, Take 1 tablet by mouth Daily for 180 days., Disp: 90 tablet, Rfl: 1    Myrbetriq 25 MG tablet sustained-release 24 hour 24 hr tablet, Take 1 tablet by mouth Daily., Disp: 30 tablet, Rfl: 0    Omega-3 Fatty Acids (fish oil) 1000 MG capsule capsule, Take  by mouth., Disp: , Rfl:     pravastatin (Pravachol) 20 MG tablet, Take 1 tablet by mouth Every Evening., Disp: 90 tablet, Rfl: 3    Probiotic Product (PROBIOTIC BLEND PO), Take 1 tablet by mouth Daily., Disp: , Rfl:     RELION INSULIN SYR 0.5ML/31G 31G X 5/16\" 0.5 ML misc, , Disp: , Rfl:     SITagliptin (Januvia) 50 MG tablet, Take 1 tablet by mouth Daily., Disp: 90 tablet, Rfl: 3    sodium bicarbonate 650 MG tablet, Take 1 tablet by mouth 3 (Three) Times a Day., Disp: 90 tablet, Rfl: 1    vitamin B-12 (CYANOCOBALAMIN) 100 MCG tablet, Take 0.5 tablets by mouth Daily., Disp: , Rfl:     vitamin C (ASCORBIC ACID) 250 MG tablet, Take 1 tablet by mouth Daily., Disp: , Rfl:     vitamin E 100 UNIT capsule, Take 1 capsule by mouth Daily., Disp: , Rfl:     Medicines reviewed by Yumiko Ching, PharmPIETER on 4/5/2024 at  2:25 PM    Drug " Interactions  No DDIs identified    Recommended Medications Assessment  Aspirin: Not Taking Currently  Statin: Currently Taking   ACEi/ARB: Not Taking Currently    Adverse Drug Reactions  Medication tolerability: Tolerating with no to minimal ADRs  Medication plan: Continue therapy with normal follow-up  Plan for ADR Management: N/A    Adherence, Self-Administration, and Current Therapy Problems  Adherence related to the patient's specialty therapy was discussed with the patient. The Adherence segment of this outreach has been reviewed and updated.     Adherence Questions  Linked Medication(s) Assessed: Empagliflozin, Sitagliptin Phosphate  On average, how many doses/injections does the patient miss per month?: 0  What are the identified reasons for non-adherence or missed doses? : no problems identified  What is the estimated medication adherence level?: %  Based on the patient/caregiver response and refill history, does this patient require an MTP to track adherence improvements?: no    Additional Barriers to Patient Self-Administration: None identified  Methods for Supporting Patient Self-Administration: N/A    Open Medication Therapy Problems  No medication therapy recommendations to display    Goals of Therapy  Goals related to the patient's specialty therapy were discussed with the patient. The Patient Goals segment of this outreach has been reviewed and updated.   Goals Addressed Today        Specialty Pharmacy General Goal      Hemoglobin    A1c < 7%    Lab Results   Component Value Date    HGBA1C 7.9 (H) 03/27/2024    HGBA1C 8.1 (H) 12/28/2023    HGBA1C 7.2 (H) 09/27/2023    HGBA1C 8.10 (H) 07/11/2023    HGBA1C 7.20 (H) 04/10/2023                  Quality of Life Assessment   Quality of Life related to the patient's enrollment in the patient management program and services provided was discussed with the patient. The QOL segment of this outreach has been reviewed and updated.  Quality of Life  Improvement Scale: 7-Somewhat better    Reassessment Plan & Follow-Up  1. Medication Therapy Changes: Increase Basaglar to 30 units nightly, may increase to 32 units nightly if continued fasting hyperglycemia, and continue Humalog 10 units before meals. Continue Jardiance 25mg once daily, Januvia 50mg daily. Decrease levothyroxine to 112mcg daily and continue pravastatin 20mg once daily.   2. Related Plans, Therapy Recommendations, or Issues to Be Addressed: None  3. Pharmacist to perform regular assessments no more than (6) months from the previous assessment.  4. Care Coordinator to set up future refill outreaches, coordinate prescription delivery, and escalate clinical questions to pharmacist.    Attestation  Therapeutic appropriateness: Appropriate   I attest the patient was actively involved in and has agreed to the above plan of care.  If the prescribed therapy is at any point deemed not appropriate based on the current or future assessments, a consultation will be initiated with the patient's specialty care provider to determine the best course of action. The revised plan of therapy will be documented along with any required assessments and/or additional patient education provided.     Yumiko Ching, PharmD, BCPS, BCCCP  Clinical Specialty Pharmacist, Endocrinology  4/5/2024  14:33 EDT

## 2024-04-05 NOTE — PROGRESS NOTES
-----------------------------------------------------------------  ENDOCRINE CLINIC NOTE  -----------------------------------------------------------------        PATIENT NAME: Marilyn Martins  PATIENT : 1965 AGE: 58 y.o.  MRN NUMBER: [id number]  PRIMARY CARE: Sondra Thomas APRN    ==========================================================================    CHIEF COMPLAINT: Type 2 Diabetes Mellitus and Hypothyrodism  DATE OF SERVICE: 24    ==========================================================================    HPI / SUBJECTIVE    58 y.o. female is seen in the clinic today for follow-up of type 2 Diabetes and Hypothyroidism.    Type 2 Diabetes Mellitus:  Diagnosed with type 2 diabetes around   Current therapy:  Jardiance 25 mg p.o. daily  Insulin Basaglar 27 units nightly  Insulin lispro 10 units with each meal  Januvia 50 mgs once a day  Medications not taking:  Ozempic due to abdominal pain and unable to eat for few days  Underlying history of CKD status post right nephrectomy in 2002  Currently checking blood sugar through G7 Dexcom.  Following in ophthalmology in the clinic, following at [facility]. Last visit was 2023 as per pt.  Reports to have mild neuropathy in legs.  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     Hypothyroidism:  Diagnosed with hypothyroidism in   Current dose is 125 mcg daily recently adjusted    ==========================================================================                                                PAST MEDICAL HISTORY    Past Medical History:   Diagnosis Date    Allergic     Anemia     Anxiety     Arthritis     Asthma     Callus     Cataract     CHF (congestive heart failure)     Cholelithiasis     Gall bladder removed    Chronic constipation     Chronic diarrhea     COPD (chronic obstructive pulmonary disease)     Depression      Diabetes mellitus     Diabetes mellitus     Difficulty walking     Unable to walk due to left leg amputated    Diverticulosis 2022    GERD (gastroesophageal reflux disease) 2005    Headache     Heart murmur 2007    Sometimes heard    History of transfusion     Last done on Dec 8th 2022 for right kidney removel    HL (hearing loss) 2012    Left ear drum is busted, hole in right ear, get excess of build up in both ears    Hyperlipidemia 2000    Hypothyroidism 1986    Uncontroled    Kidney stone 2022    Left side two removed by lazer surgery,right kidney removed Dec.8th of this year.    Low back pain 2001    Neuropathy in diabetes     Obesity 1989    Pneumonia 2022 March of this year    Renal insufficiency 2016    Shortness of breath     Sinusitis     Substance abuse     Urinary tract infection     Visual impairment 1985       ==========================================================================    PAST SURGICAL HISTORY    Past Surgical History:   Procedure Laterality Date    ABOVE KNEE AMPUTATION Left 02/2000    ANKLE OPEN REDUCTION INTERNAL FIXATION      Before on left ankle    CHOLECYSTECTOMY  2002    COLONOSCOPY  2004    EYE SURGERY  2014    Carrects    FOOT SURGERY      Had broken left ankle before amputation    HERNIA REPAIR      HYSTERECTOMY      JOINT REPLACEMENT      SUBTOTAL HYSTERECTOMY      TOENAIL EXCISION      Right big toe nail       ==========================================================================    FAMILY HISTORY    Family History   Problem Relation Age of Onset    Anxiety disorder Daughter     Developmental Disability Daughter     Diabetes Daughter         Pre diabetic with first child    Learning disabilities Daughter     Mental illness Daughter         Cutter, anything to harm herself, hanging, burning etc.    Anxiety disorder Daughter     Developmental Disability Daughter     Diabetes Daughter     Learning disabilities Daughter     Mental illness Daughter         Cutters     Developmental Disability Son     Learning disabilities Son     Diabetes Mother        ==========================================================================    SOCIAL HISTORY    Social History     Socioeconomic History    Marital status:     Number of children: 3    Years of education: 12   Tobacco Use    Smoking status: Never    Smokeless tobacco: Never   Vaping Use    Vaping status: Never Used   Substance and Sexual Activity    Alcohol use: Yes     Comment: Wine coolers and sleeping pills together 1996    Drug use: Never    Sexual activity: Not Currently     Partners: Male     Birth control/protection: Hysterectomy       ==========================================================================    MEDICATIONS      Current Outpatient Medications:     Accu-Chek Softclix Lancets lancets, Use as instructed to check blood sugar before meals and at bedtime, Disp: 100 each, Rfl: 12    albuterol sulfate  (90 Base) MCG/ACT inhaler, Inhale 2 puffs every 6 hours as needed for wheezing and shortness of breath, Disp: 18 g, Rfl: 1    BIOTIN PO, Take 1 tablet by mouth Daily., Disp: , Rfl:     Blood Glucose Monitoring Suppl (Accu-Chek Guide) w/Device kit, 1 each 4 (Four) Times a Day After Meals & at Bedtime., Disp: 1 kit, Rfl: 0    cetirizine (Allergy Relief Cetirizine) 10 MG tablet, Take 1 tablet by mouth Daily., Disp: 30 tablet, Rfl: 3    Cholecalciferol (VITAMIN D-3 PO), Take 1 tablet by mouth Daily., Disp: , Rfl:     Continuous Blood Gluc Sensor (Dexcom G7 Sensor) misc, Use 1 each Every 10 (Ten) Days., Disp: 9 each, Rfl: 3    ferrous sulfate 324 (65 Fe) MG tablet delayed-release EC tablet, Take 1 tablet by mouth Daily With Breakfast., Disp: , Rfl:     furosemide (LASIX) 20 MG tablet, Take 1 tablet by mouth Daily., Disp: 30 tablet, Rfl: 6    gabapentin (NEURONTIN) 300 MG capsule, Take 1 capsule by mouth 3 (Three) Times a Day., Disp: 90 capsule, Rfl: 3    glucose blood (Accu-Chek Guide) test strip, Use as  "instructed to test blood sugar 4 times daily, Disp: 200 each, Rfl: 5    Insulin Glargine (Lantus SoloStar) 100 UNIT/ML injection pen, Inject 32 Units under the skin into the appropriate area as directed Every Night., Disp: 15 mL, Rfl: 5    Insulin Lispro, 1 Unit Dial, (HumaLOG KwikPen) 100 UNIT/ML solution pen-injector, Inject 10 Units under the skin into the appropriate area as directed 3 (Three) Times a Day With Meals., Disp: 15 mL, Rfl: 5    Insulin Pen Needle 32G X 4 MM misc, 1 each 4 (Four) Times a Day Before Meals & at Bedtime., Disp: 200 each, Rfl: 5    Jardiance 25 MG tablet tablet, Take 1 tablet by mouth Every Morning., Disp: 90 tablet, Rfl: 1    levoFLOXacin (LEVAQUIN) 250 MG tablet, Take 1 tablet by mouth Daily., Disp: 5 tablet, Rfl: 0    Myrbetriq 25 MG tablet sustained-release 24 hour 24 hr tablet, Take 1 tablet by mouth Daily., Disp: 30 tablet, Rfl: 0    Omega-3 Fatty Acids (fish oil) 1000 MG capsule capsule, Take  by mouth., Disp: , Rfl:     Probiotic Product (PROBIOTIC BLEND PO), Take 1 tablet by mouth Daily., Disp: , Rfl:     RELION INSULIN SYR 0.5ML/31G 31G X 5/16\" 0.5 ML misc, , Disp: , Rfl:     SITagliptin (Januvia) 50 MG tablet, Take 1 tablet by mouth Daily., Disp: 90 tablet, Rfl: 3    sodium bicarbonate 650 MG tablet, Take 1 tablet by mouth 3 (Three) Times a Day., Disp: 90 tablet, Rfl: 1    vitamin B-12 (CYANOCOBALAMIN) 100 MCG tablet, Take 0.5 tablets by mouth Daily., Disp: , Rfl:     vitamin C (ASCORBIC ACID) 250 MG tablet, Take 1 tablet by mouth Daily., Disp: , Rfl:     vitamin E 100 UNIT capsule, Take 1 capsule by mouth Daily., Disp: , Rfl:     levothyroxine (Synthroid) 112 MCG tablet, Take 1 tablet by mouth Daily for 180 days., Disp: 90 tablet, Rfl: 1    pravastatin (Pravachol) 20 MG tablet, Take 1 tablet by mouth Every Evening., Disp: 90 tablet, Rfl: 3    ==========================================================================    ALLERGIES    Allergies   Allergen Reactions    Latex " Other (See Comments)    Morphine And Related Rash    Cephalexin Diarrhea and Nausea And Vomiting     Extreme vomiting, can't keep food or water down either     Codeine Other (See Comments)    Sulfa Antibiotics Other (See Comments)    Povidone Iodine Rash       ==========================================================================    OBJECTIVE    Vitals:    04/05/24 0935   BP: 130/78   Pulse: (!) 140   SpO2: 98%     Body mass index is 48.78 kg/m².     General: Alert, cooperative, no acute distress  Thyroid:  no enlargement/tenderness/palpable nodules  Lungs: Clear to auscultation bilaterally, respirations unlabored  Heart: Regular rate and rhythm, S1 and S2 normal, no murmur, rub or gallop  Abdomen: Soft, NT, ND and Bowel sounds Positive    ==========================================================================    LAB EVALUATION    Lab Results   Component Value Date    GLUCOSE 131 (H) 03/27/2024    BUN 31 (H) 03/27/2024    CREATININE 1.76 (H) 03/27/2024    EGFRIFAFRI 32 (L) 12/09/2022    BCR 18 03/27/2024    K 5.0 03/27/2024    CO2 25 03/27/2024    CALCIUM 9.4 03/27/2024    PROTENTOTREF 8.4 03/27/2024    ALBUMIN 3.6 (L) 03/27/2024    LABIL2 0.8 (L) 03/27/2024    AST 17 03/27/2024    ALT 12 03/27/2024    CHOL 166 07/11/2023    TRIG 143 12/28/2023    HDL 27 (L) 12/28/2023    LDL 90 12/28/2023     Lab Results   Component Value Date    HGBA1C 7.9 (H) 03/27/2024    HGBA1C 8.1 (H) 12/28/2023    HGBA1C 7.2 (H) 09/27/2023     Lab Results   Component Value Date    MICROALBUR 51.1 12/28/2023    CREATININE 1.76 (H) 03/27/2024     Lab Results   Component Value Date    TSH 0.444 (L) 03/27/2024    FREET4 1.43 03/27/2024     ==========================================================================    CGM Data:    Dates: March 23 till April 5, 2024    Very High > 250: 3%  High 180 - 250: 53%  Target: 70 - 180: 44%  Low 55 - 70:  0%  Very Low < 55: 0%    Average blood sugar 187 with standard deviation of 33 and days active  "was 71%.    ==========================================================================    ASSESSMENT AND PLAN    # Type 2 diabetes with hyperglycemia  # Morbid obesity with BMI 48.78  # Peripheral diabetic neuropathy   # CKD stage III with microalbuminuria, following nephrology as outpatient with Dr. Mccauley    - Reviewed CGM data patient have no hypoglycemia but significant fasting hyperglycemia  - Kidney function still stable  - Will uptitrate the dose of Basaglar while continuing rest of the therapy without any changes  Jardiance 25 mg p.o. daily  Insulin Basaglar 30 units nightly, if needed can go up to 32 units  Insulin lispro 10 units with each meal  Januvia 50 mgs once a day  - Continue to monitor blood sugar through Dexcom G7, patient is able to keep that stable    # Hypothyroidism with iatrogenic hyperthyroidism  - Thyroid function is improving  - Will de-escalate levothyroxine dose 212 mcg and repeat blood work in 3 months time    # Hyperlipidemia  - Patient was started on pravastatin 40 mg but was adjusted by nephrology team down to 20 mg  - Continue pravastatin 20 mg    Thank you for courtesy of consultation.    Return to clinic: 3 months    Entire assessment and plan was discussed and counseled the patient in detail to which patient verbalized understanding and agreed with care.  Answered all queries and concerns.    This note was created using voice recognition software and is inherently subject to errors including those of syntax and \"sound-alike\" substitutions which may escape proofreading.  In such instances, original meaning may be extrapolated by contextual derivation.    Note: Portions of this note may have been copied from previous notes but documentation have been reviewed and edited as necessary to support clinical decision making for today's visit.    ==========================================================================    INFORMATION PROVIDED TO PATIENT    Patient Instructions "   Please,     - Continue Jardiance 25 mgs once a day    - Increase nighttime insulin with Basaglar to 30 units every night. (If you still have high blood sugars fasting then increase to 32 units)    - Use mealtime insulin 10 units with each meal with no correction scale.    Mealtime insulin needs to be taking 15 minutes before each meal.  Please check your blood sugar before giving mealtime insulin.    - Continue Januvia 50 mg 1 pill by mouth daily.    -Continue to check your blood sugar through Dexcom G7.     If your blood sugar is running less than 100 and fasting then decrease your nighttime insulin by 3 units.    If your blood sugar before lunch, before dinner and before bedtime snack is less than 100 then decrease your mealtime insulin by 2 units.     Continue pravastatin therapy to 20 mg 1 pill by mouth daily.     For levothyroxine decrease to 112 mcg 1 pill by mouth early in the morning at fasting state and maintain fasting for at least 40 minutes.     Repeat blood work before next visit in 3 months and follow up.    Thank you for your visit today.     If you have any questions or concerns please feel free to reach out of the office.      ==========================================================================  Ray Narvaez MD  Department of Endocrine, Diabetes and Metabolism  [facility], [place]  ==========================================================================

## 2024-04-05 NOTE — PATIENT INSTRUCTIONS
Please,     - Continue Jardiance 25 mgs once a day    - Increase nighttime insulin with Basaglar to 30 units every night. (If you still have high blood sugars fasting then increase to 32 units)    - Use mealtime insulin 10 units with each meal with no correction scale.    Mealtime insulin needs to be taking 15 minutes before each meal.  Please check your blood sugar before giving mealtime insulin.    - Continue Januvia 50 mg 1 pill by mouth daily.    -Continue to check your blood sugar through Dexcom G7.     If your blood sugar is running less than 100 and fasting then decrease your nighttime insulin by 3 units.    If your blood sugar before lunch, before dinner and before bedtime snack is less than 100 then decrease your mealtime insulin by 2 units.     Continue pravastatin therapy to 20 mg 1 pill by mouth daily.     For levothyroxine decrease to 112 mcg 1 pill by mouth early in the morning at fasting state and maintain fasting for at least 40 minutes.     Repeat blood work before next visit in 3 months and follow up.    Thank you for your visit today.     If you have any questions or concerns please feel free to reach out of the office.

## 2024-04-10 ENCOUNTER — HOSPITAL ENCOUNTER (OUTPATIENT)
Dept: ULTRASOUND IMAGING | Facility: HOSPITAL | Age: 59
Discharge: HOME | End: 2024-04-10
Admitting: UROLOGY
Payer: MEDICAID

## 2024-04-10 DIAGNOSIS — N13.30 HYDRONEPHROSIS, UNSPECIFIED HYDRONEPHROSIS TYPE: ICD-10-CM

## 2024-04-10 PROCEDURE — 76775 US EXAM ABDO BACK WALL LIM: CPT

## 2024-04-17 DIAGNOSIS — R35.1 FREQUENT URINATION AT NIGHT: ICD-10-CM

## 2024-04-17 RX ORDER — MIRABEGRON 25 MG/1
25 TABLET, FILM COATED, EXTENDED RELEASE ORAL DAILY
Qty: 30 TABLET | Refills: 0 | Status: CANCELLED | OUTPATIENT
Start: 2024-04-17

## 2024-04-17 RX ORDER — MIRABEGRON 25 MG/1
25 TABLET, FILM COATED, EXTENDED RELEASE ORAL DAILY
Qty: 30 TABLET | Refills: 0 | Status: SHIPPED | OUTPATIENT
Start: 2024-04-17

## 2024-04-17 RX ORDER — GABAPENTIN 300 MG/1
300 CAPSULE ORAL 3 TIMES DAILY
Qty: 90 CAPSULE | Refills: 3 | Status: CANCELLED | OUTPATIENT
Start: 2024-04-17

## 2024-04-17 RX ORDER — GABAPENTIN 300 MG/1
300 CAPSULE ORAL 3 TIMES DAILY
Qty: 90 CAPSULE | Refills: 3 | Status: SHIPPED | OUTPATIENT
Start: 2024-04-17

## 2024-04-17 NOTE — TELEPHONE ENCOUNTER
Rx Refill Note  Requested Prescriptions     Pending Prescriptions Disp Refills    gabapentin (NEURONTIN) 300 MG capsule 90 capsule 3     Sig: Take 1 capsule by mouth 3 (Three) Times a Day.     Signed Prescriptions Disp Refills    Myrbetriq 25 MG tablet sustained-release 24 hour 24 hr tablet 30 tablet 0     Sig: Take 1 tablet by mouth Daily.     Authorizing Provider: GENARO LOBATO     Ordering User: MYRTLE PIRES      Last office visit with prescribing clinician: 10/5/2023   Last telemedicine visit with prescribing clinician: Visit date not found   Next office visit with prescribing clinician: Visit date not found                         Would you like a call back once the refill request has been completed: [] Yes [] No    If the office needs to give you a call back, can they leave a voicemail: [] Yes [] No    Myrtle Pires MA  04/17/24, 12:57 EDT

## 2024-04-19 ENCOUNTER — SPECIALTY PHARMACY (OUTPATIENT)
Dept: ENDOCRINOLOGY | Facility: CLINIC | Age: 59
End: 2024-04-19
Payer: MEDICAID

## 2024-04-19 NOTE — PROGRESS NOTES
"Specialty Pharmacy Refill Coordination Note     Marilyn \"Delphine\" is a 58 y.o. female contacted today regarding refills of her specialty medication(s).    Specialty medication(s) and dose(s) confirmed: yes  Changes to medications: no  Changes to insurance: no  Reviewed and verified with patient:         Refill Questions      Flowsheet Row Most Recent Value   Changes to allergies? No   Changes to medications? No   New conditions or infections since last clinic visit No   Unplanned office visit, urgent care, ED, or hospital admission in the last 4 weeks  No   How does patient/caregiver feel medication is working? Good   Financial problems or insurance changes  No   Since the previous refill, were any specialty medication doses or scheduled injections missed or delayed?  No   Does this patient require a clinical escalation to a pharmacist? No            Delivery Questions      Flowsheet Row Most Recent Value   Delivery method  at Pharmacy   Number of medications in delivery 5   Medication(s) being filled and delivered Cetirizine Hcl (Antihistamines - Non-Sedating), Furosemide, Gabapentin (Anticonvulsants - Misc.), Mirabegron, Sodium Bicarbonate (Antacids - Bicarbonate)   Copay verified? Yes   Copay amount 0   Copay form of payment No copayment ($0)                 Follow-up: 1 month(s)     Shirley Arellano, Pharmacy Technician  Specialty Pharmacy Technician   4/19/2024   13:45 EDT      "

## 2024-04-25 ENCOUNTER — TRANSCRIBE ORDERS (OUTPATIENT)
Dept: FAMILY MEDICINE CLINIC | Facility: CLINIC | Age: 59
End: 2024-04-25
Payer: MEDICAID

## 2024-04-25 DIAGNOSIS — N18.32 CHRONIC KIDNEY DISEASE (CKD) STAGE G3B/A1, MODERATELY DECREASED GLOMERULAR FILTRATION RATE (GFR) BETWEEN 30-44 ML/MIN/1.73 SQUARE METER AND ALBUMINURIA CREATININE RATIO LESS THAN 30 MG/G (CMS/H*: Primary | ICD-10-CM

## 2024-05-02 ENCOUNTER — SPECIALTY PHARMACY (OUTPATIENT)
Dept: ENDOCRINOLOGY | Facility: CLINIC | Age: 59
End: 2024-05-02
Payer: MEDICAID

## 2024-05-02 NOTE — PROGRESS NOTES
"Specialty Pharmacy Refill Coordination Note     Marilyn \"Delphine\" is a 58 y.o. female contacted today regarding refills of her specialty medication(s).    Specialty medication(s) and dose(s) confirmed: YES  Changes to medications: no  Changes to insurance: no  Reviewed and verified with patient:         Refill Questions      Flowsheet Row Most Recent Value   Changes to allergies? No   Changes to medications? No   New conditions or infections since last clinic visit No   Unplanned office visit, urgent care, ED, or hospital admission in the last 4 weeks  No   How does patient/caregiver feel medication is working? Good   Financial problems or insurance changes  No   Since the previous refill, were any specialty medication doses or scheduled injections missed or delayed?  No   Does this patient require a clinical escalation to a pharmacist? No            Delivery Questions      Flowsheet Row Most Recent Value   Delivery method  at Pharmacy   Medication(s) being filled and delivered Lancets, Continuous Glucose Sensor, Glucose Blood, Insulin Pen Needle   Copay verified? Yes   Copay amount 0   Copay form of payment No copayment ($0)                 Follow-up: 1 month(s)     Shirley Arellano, Pharmacy Technician  Specialty Pharmacy Technician   5/2/2024   09:50 EDT      "

## 2024-05-10 DIAGNOSIS — R35.1 FREQUENT URINATION AT NIGHT: ICD-10-CM

## 2024-05-10 RX ORDER — MIRABEGRON 25 MG/1
25 TABLET, FILM COATED, EXTENDED RELEASE ORAL DAILY
Qty: 30 TABLET | Refills: 0 | Status: SHIPPED | OUTPATIENT
Start: 2024-05-10

## 2024-05-10 RX ORDER — CETIRIZINE HYDROCHLORIDE 10 MG/1
10 TABLET ORAL DAILY
Qty: 30 TABLET | Refills: 3 | Status: SHIPPED | OUTPATIENT
Start: 2024-05-10

## 2024-05-12 ENCOUNTER — READMISSION MANAGEMENT (OUTPATIENT)
Dept: CALL CENTER | Facility: HOSPITAL | Age: 59
End: 2024-05-12
Payer: MEDICAID

## 2024-05-13 ENCOUNTER — TRANSITIONAL CARE MANAGEMENT TELEPHONE ENCOUNTER (OUTPATIENT)
Dept: CALL CENTER | Facility: HOSPITAL | Age: 59
End: 2024-05-13
Payer: MEDICAID

## 2024-05-13 NOTE — OUTREACH NOTE
Call Center TCM Note      Flowsheet Row Responses   [facility] patient discharged from? Non-  [[facility]]   Does the patient have one of the following disease processes/diagnoses(primary or secondary)? Other   TCM attempt successful? No   Unsuccessful attempts Attempt 1            Mago Al LPN    5/13/2024, 09:25 EDT        
Yes

## 2024-05-13 NOTE — OUTREACH NOTE
Call Center TCM Note      Flowsheet Row Responses   [facility] [place] patient discharged from? Non-  [[facility]]   Does the patient have one of the following disease processes/diagnoses(primary or secondary)? Other   TCM attempt successful? No   Unsuccessful attempts Attempt 2            Mago Al LPN    5/13/2024, 15:58 EDT

## 2024-05-14 ENCOUNTER — TRANSITIONAL CARE MANAGEMENT TELEPHONE ENCOUNTER (OUTPATIENT)
Dept: CALL CENTER | Facility: HOSPITAL | Age: 59
End: 2024-05-14
Payer: MEDICAID

## 2024-05-14 NOTE — OUTREACH NOTE
Call Center TCM Note      Flowsheet Row Responses   [facility] patient discharged from? Non-   Does the patient have one of the following disease processes/diagnoses(primary or secondary)? Other   TCM attempt successful? No   Unsuccessful attempts Attempt 3   Call Status Left message            Jaimie Pena RN    5/14/2024, 11:57 EDT

## 2024-05-29 ENCOUNTER — OFFICE VISIT (OUTPATIENT)
Dept: FAMILY MEDICINE CLINIC | Facility: CLINIC | Age: 59
End: 2024-05-29
Payer: MEDICAID

## 2024-05-29 ENCOUNTER — SPECIALTY PHARMACY (OUTPATIENT)
Dept: ENDOCRINOLOGY | Facility: CLINIC | Age: 59
End: 2024-05-29
Payer: MEDICAID

## 2024-05-29 VITALS
BODY MASS INDEX: 50.48 KG/M2 | HEART RATE: 73 BPM | WEIGHT: 267.4 LBS | DIASTOLIC BLOOD PRESSURE: 64 MMHG | TEMPERATURE: 97.8 F | OXYGEN SATURATION: 100 % | SYSTOLIC BLOOD PRESSURE: 105 MMHG | HEIGHT: 61 IN

## 2024-05-29 DIAGNOSIS — N12 PYELONEPHRITIS: Primary | ICD-10-CM

## 2024-05-29 DIAGNOSIS — I48.91 ATRIAL FIBRILLATION, UNSPECIFIED TYPE: ICD-10-CM

## 2024-05-29 DIAGNOSIS — R40.0 HAS DAYTIME DROWSINESS: ICD-10-CM

## 2024-05-29 DIAGNOSIS — R06.83 SNORING: ICD-10-CM

## 2024-05-29 DIAGNOSIS — E66.01 CLASS 3 SEVERE OBESITY WITH SERIOUS COMORBIDITY AND BODY MASS INDEX (BMI) OF 50.0 TO 59.9 IN ADULT, UNSPECIFIED OBESITY TYPE: ICD-10-CM

## 2024-05-29 DIAGNOSIS — N13.30 HYDRONEPHROSIS, UNSPECIFIED HYDRONEPHROSIS TYPE: ICD-10-CM

## 2024-05-29 DIAGNOSIS — J44.9 CHRONIC OBSTRUCTIVE PULMONARY DISEASE, UNSPECIFIED COPD TYPE: ICD-10-CM

## 2024-05-29 PROCEDURE — 1126F AMNT PAIN NOTED NONE PRSNT: CPT | Performed by: NURSE PRACTITIONER

## 2024-05-29 PROCEDURE — 99214 OFFICE O/P EST MOD 30 MIN: CPT | Performed by: NURSE PRACTITIONER

## 2024-05-29 PROCEDURE — 3051F HG A1C>EQUAL 7.0%<8.0%: CPT | Performed by: NURSE PRACTITIONER

## 2024-05-29 RX ORDER — BUDESONIDE AND FORMOTEROL FUMARATE DIHYDRATE 160; 4.5 UG/1; UG/1
2 AEROSOL RESPIRATORY (INHALATION)
COMMUNITY
Start: 2024-05-09 | End: 2024-05-29 | Stop reason: SDUPTHER

## 2024-05-29 RX ORDER — MIDODRINE HYDROCHLORIDE 2.5 MG/1
2.5 TABLET ORAL 3 TIMES DAILY
COMMUNITY
Start: 2024-05-12 | End: 2024-06-03 | Stop reason: SDUPTHER

## 2024-05-29 NOTE — PROGRESS NOTES
"Specialty Pharmacy Refill Coordination Note     Marilyn \"Deplhine\" is a 58 y.o. female contacted today regarding refills of her specialty medication(s).    Specialty medication(s) and dose(s) confirmed: yes  Changes to medications: no  Changes to insurance: no  Reviewed and verified with patient:         Refill Questions      Flowsheet Row Most Recent Value   Changes to allergies? No   Changes to medications? No   New conditions or infections since last clinic visit No   Unplanned office visit, urgent care, ED, or hospital admission in the last 4 weeks  No   How does patient/caregiver feel medication is working? Good   Financial problems or insurance changes  No   Since the previous refill, were any specialty medication doses or scheduled injections missed or delayed?  No   Does this patient require a clinical escalation to a pharmacist? No            Delivery Questions      Flowsheet Row Most Recent Value   Delivery method  at Pharmacy   Number of medications in delivery 5   Medication(s) being filled and delivered Gabapentin (Anticonvulsants - Misc.), Cetirizine Hcl (Antihistamines - Non-Sedating), Insulin Lispro, Insulin Glargine, Mirabegron   Copay verified? Yes   Copay amount 0   Copay form of payment No copayment ($0)                 Follow-up: 1 month(s)     Shirley Arellano, Pharmacy Technician  Specialty Pharmacy Technician   5/29/2024   10:41 EDT      "

## 2024-05-29 NOTE — PROGRESS NOTES
"Subjective   Marilyn Martins is a 58 y.o. female presents for   Chief Complaint   Patient presents with    Atrial Fibrillation     Hospital follow up, also had a stent put in her left kidney.       Health Maintenance Due   Topic Date Due    ANNUAL PHYSICAL  Never done    RSV Vaccine - Adults (1 - 1-dose 60+ series) Never done    COVID-19 Vaccine (3 - 2023-24 season) 09/01/2023    COLORECTAL CANCER SCREENING  06/28/2024       History of Present Illness   Pt present for hospital follow up. She was initially sent to [facility] ER from her urologist office due to severe flank pain.  Pt was found to be in afib and also dx with pyelonephritis.  She was transferred to [facility].  She was discharged with instructions to follow up with afib clinic and is currently wearing a heart monitor.  She is taking medications as directed and denies problems or side effects.  Pt does not yet have appt for follow up and encouraged to call for appt. patient also states that she was told A-fib may be secondary to sleep apnea.  A sleep study was ordered in [place], but she states it is very difficult for her to get over there and requests an order for a sleep study closer to home.  Discussed possible symptoms of sleep apnea, and patient reports that she frequently snores, experiences daytime drowsiness, and will often wake up several times during the night.  Home sleep study ordered to evaluate for sleep apnea.  She also has a Renal stent still in place.  She is tolerating well but requests follow up with a different urologist. She was unsatisfied with previous care and also requests a provider closer to home.   She also requests refill of symbicort.     Vitals:    05/29/24 1027   BP: 105/64   BP Location: Right arm   Patient Position: Sitting   Cuff Size: Large Adult   Pulse: 73   Temp: 97.8 °F (36.6 °C)   TempSrc: Tympanic   SpO2: 100%   Weight: 121 kg (267 lb 6.4 oz)   Height: 154.9 cm (60.98\")     Body mass index is 50.55 " kg/m².    Current Outpatient Medications on File Prior to Visit   Medication Sig Dispense Refill    Accu-Chek Softclix Lancets lancets Use as instructed to check blood sugar before meals and at bedtime 100 each 12    albuterol sulfate  (90 Base) MCG/ACT inhaler Inhale 2 puffs every 6 hours as needed for wheezing and shortness of breath 18 g 1    apixaban (ELIQUIS) 5 MG tablet tablet Take 1 tablet by mouth Every 12 (Twelve) Hours.      Blood Glucose Monitoring Suppl (Accu-Chek Guide) w/Device kit 1 each 4 (Four) Times a Day After Meals & at Bedtime. 1 kit 0    cetirizine (Allergy Relief Cetirizine) 10 MG tablet Take 1 tablet by mouth Daily. 30 tablet 3    Colloidal Oatmeal 1 % lotion Apply 1 Application topically 2 (Two) Times a Day.      Continuous Glucose Sensor (Dexcom G7 Sensor) misc Use 1 each Every 10 (Ten) Days. 9 each 3    ferrous sulfate 324 (65 Fe) MG tablet delayed-release EC tablet Take 1 tablet by mouth Daily With Breakfast.      gabapentin (NEURONTIN) 300 MG capsule Take 1 capsule by mouth 3 (Three) Times a Day. 90 capsule 3    glucose blood (Accu-Chek Guide) test strip Use as instructed to test blood sugar 4 times daily 200 each 5    Insulin Glargine (Lantus SoloStar) 100 UNIT/ML injection pen Inject 32 Units under the skin into the appropriate area as directed Every Night. 15 mL 5    Insulin Lispro, 1 Unit Dial, (HumaLOG KwikPen) 100 UNIT/ML solution pen-injector Inject 10 Units under the skin into the appropriate area as directed 3 (Three) Times a Day With Meals. 15 mL 5    Insulin Pen Needle 32G X 4 MM misc 1 each 4 (Four) Times a Day Before Meals & at Bedtime. 200 each 5    Jardiance 25 MG tablet tablet Take 1 tablet by mouth Every Morning. 90 tablet 1    levothyroxine (Synthroid) 112 MCG tablet Take 1 tablet by mouth Daily for 180 days. 90 tablet 1    metoprolol tartrate (LOPRESSOR) 25 MG tablet Take 1 tablet by mouth 2 (Two) Times a Day.      midodrine (PROAMATINE) 2.5 MG tablet Take 1  "tablet by mouth 3 (Three) Times a Day.      Myrbetriq 25 MG tablet sustained-release 24 hour 24 hr tablet Take 1 tablet by mouth Daily. 30 tablet 0    pravastatin (Pravachol) 20 MG tablet Take 1 tablet by mouth Every Evening. 90 tablet 3    RELION INSULIN SYR 0.5ML/31G 31G X 5/16\" 0.5 ML misc       sodium bicarbonate 650 MG tablet Take 1 tablet by mouth 3 (Three) Times a Day. 90 tablet 1    BIOTIN PO Take 1 tablet by mouth Daily. (Patient not taking: Reported on 5/29/2024)      Cholecalciferol (VITAMIN D-3 PO) Take 1 tablet by mouth Daily. (Patient not taking: Reported on 5/29/2024)      levoFLOXacin (LEVAQUIN) 250 MG tablet Take 1 tablet by mouth Daily. (Patient not taking: Reported on 4/5/2024) 5 tablet 0    Omega-3 Fatty Acids (fish oil) 1000 MG capsule capsule Take  by mouth. (Patient not taking: Reported on 5/29/2024)      Probiotic Product (PROBIOTIC BLEND PO) Take 1 tablet by mouth Daily. (Patient not taking: Reported on 5/29/2024)      vitamin B-12 (CYANOCOBALAMIN) 100 MCG tablet Take 0.5 tablets by mouth Daily. (Patient not taking: Reported on 5/29/2024)      vitamin C (ASCORBIC ACID) 250 MG tablet Take 1 tablet by mouth Daily. (Patient not taking: Reported on 5/29/2024)      vitamin E 100 UNIT capsule Take 1 capsule by mouth Daily. (Patient not taking: Reported on 5/29/2024)      [DISCONTINUED] furosemide (LASIX) 20 MG tablet Take 1 tablet by mouth Daily. 30 tablet 6    [DISCONTINUED] SITagliptin (Januvia) 50 MG tablet Take 1 tablet by mouth Daily. 90 tablet 3     No current facility-administered medications on file prior to visit.       The following portions of the patient's history were reviewed and updated as appropriate: allergies, current medications, past family history, past medical history, past social history, past surgical history, and problem list.    Review of Systems    Objective   Physical Exam  Vitals and nursing note reviewed.   Constitutional:       Appearance: Normal appearance. She is " well-developed. She is obese.   HENT:      Head: Normocephalic and atraumatic.      Right Ear: Tympanic membrane, ear canal and external ear normal.      Left Ear: Tympanic membrane, ear canal and external ear normal.      Nose: Nose normal.   Eyes:      Extraocular Movements: Extraocular movements intact.      Pupils: Pupils are equal, round, and reactive to light.   Cardiovascular:      Rate and Rhythm: Normal rate and regular rhythm.      Heart sounds: Normal heart sounds.   Pulmonary:      Effort: Pulmonary effort is normal.      Breath sounds: Normal breath sounds.   Abdominal:      General: Bowel sounds are normal.      Palpations: Abdomen is soft.   Genitourinary:     Vagina: Normal.   Musculoskeletal:         General: Normal range of motion.      Cervical back: Normal range of motion and neck supple.      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   Skin:     General: Skin is warm and dry.   Neurological:      General: No focal deficit present.      Mental Status: She is alert and oriented to person, place, and time.   Psychiatric:         Mood and Affect: Mood normal.         Behavior: Behavior normal.         Judgment: Judgment normal.       PHQ-9 Total Score:      Assessment & Plan   Diagnoses and all orders for this visit:    1. Pyelonephritis (Primary)  -     Ambulatory Referral to Urology    2. Atrial fibrillation, unspecified type  -     Home Sleep Study; Future    3. Has daytime drowsiness  -     Home Sleep Study; Future    4. Snoring  -     Home Sleep Study; Future    5. Hydronephrosis, unspecified hydronephrosis type  -     Ambulatory Referral to Urology    6. Class 3 severe obesity with serious comorbidity and body mass index (BMI) of 50.0 to 59.9 in adult, unspecified obesity type  Assessment & Plan:  Patient's (Body mass index is 50.55 kg/m².) indicates that they are morbidly/severely obese (BMI > 40 or > 35 with obesity - related health condition) with health conditions that  include diabetes mellitus and dyslipidemias . Weight is worsening. BMI  is above average; BMI management plan is completed. We discussed low calorie, low carb based diet program, portion control, increasing exercise, and joining a fitness center or start home based exercise program.       7. Chronic obstructive pulmonary disease, unspecified COPD type  -     budesonide-formoterol (SYMBICORT) 160-4.5 MCG/ACT inhaler; Inhale 2 puffs 2 (Two) Times a Day.  Dispense: 10 g; Refill: 6        There are no Patient Instructions on file for this visit.

## 2024-05-30 PROBLEM — E66.01 CLASS 3 SEVERE OBESITY WITH SERIOUS COMORBIDITY AND BODY MASS INDEX (BMI) OF 50.0 TO 59.9 IN ADULT: Status: ACTIVE | Noted: 2024-05-30

## 2024-05-30 PROBLEM — E66.813 CLASS 3 SEVERE OBESITY WITH SERIOUS COMORBIDITY AND BODY MASS INDEX (BMI) OF 50.0 TO 59.9 IN ADULT: Status: ACTIVE | Noted: 2024-05-30

## 2024-05-30 RX ORDER — BUDESONIDE AND FORMOTEROL FUMARATE DIHYDRATE 160; 4.5 UG/1; UG/1
2 AEROSOL RESPIRATORY (INHALATION)
Qty: 10.2 G | Refills: 6 | Status: SHIPPED | OUTPATIENT
Start: 2024-05-30

## 2024-05-30 NOTE — ASSESSMENT & PLAN NOTE
Patient's (Body mass index is 50.55 kg/m².) indicates that they are morbidly/severely obese (BMI > 40 or > 35 with obesity - related health condition) with health conditions that include diabetes mellitus and dyslipidemias . Weight is worsening. BMI  is above average; BMI management plan is completed. We discussed low calorie, low carb based diet program, portion control, increasing exercise, and joining a fitness center or start home based exercise program.

## 2024-06-03 RX ORDER — MIDODRINE HYDROCHLORIDE 2.5 MG/1
2.5 TABLET ORAL 3 TIMES DAILY
Qty: 90 TABLET | Refills: 3 | Status: SHIPPED | OUTPATIENT
Start: 2024-06-03

## 2024-06-03 NOTE — TELEPHONE ENCOUNTER
Rx Refill Note  Requested Prescriptions     Pending Prescriptions Disp Refills    metoprolol tartrate (LOPRESSOR) 25 MG tablet       Sig: Take 1 tablet by mouth 2 (Two) Times a Day.    midodrine (PROAMATINE) 2.5 MG tablet       Sig: Take 1 tablet by mouth 3 (Three) Times a Day.    apixaban (ELIQUIS) 5 MG tablet tablet 60 tablet      Sig: Take 1 tablet by mouth Every 12 (Twelve) Hours.      Last office visit with prescribing clinician: 5/29/2024   Last telemedicine visit with prescribing clinician: Visit date not found   Next office visit with prescribing clinician: 8/29/2024                         Would you like a call back once the refill request has been completed: [] Yes [] No    If the office needs to give you a call back, can they leave a voicemail: [] Yes [] No    Myrtle Brown MA  06/03/24, 08:47 EDT

## 2024-06-05 ENCOUNTER — OFFICE VISIT (OUTPATIENT)
Dept: ENDOCRINOLOGY | Facility: CLINIC | Age: 59
End: 2024-06-05
Payer: MEDICAID

## 2024-06-05 VITALS
WEIGHT: 261 LBS | SYSTOLIC BLOOD PRESSURE: 116 MMHG | OXYGEN SATURATION: 97 % | HEIGHT: 61 IN | BODY MASS INDEX: 49.28 KG/M2 | DIASTOLIC BLOOD PRESSURE: 60 MMHG | HEART RATE: 69 BPM

## 2024-06-05 DIAGNOSIS — E66.01 MORBID OBESITY: ICD-10-CM

## 2024-06-05 DIAGNOSIS — E11.65 TYPE 2 DIABETES MELLITUS WITH HYPERGLYCEMIA, WITH LONG-TERM CURRENT USE OF INSULIN: Primary | ICD-10-CM

## 2024-06-05 DIAGNOSIS — E78.49 OTHER HYPERLIPIDEMIA: ICD-10-CM

## 2024-06-05 DIAGNOSIS — E03.9 HYPOTHYROIDISM, UNSPECIFIED TYPE: ICD-10-CM

## 2024-06-05 DIAGNOSIS — Z79.4 TYPE 2 DIABETES MELLITUS WITH HYPERGLYCEMIA, WITH LONG-TERM CURRENT USE OF INSULIN: Primary | ICD-10-CM

## 2024-06-05 DIAGNOSIS — E11.42 DIABETIC PERIPHERAL NEUROPATHY: ICD-10-CM

## 2024-06-05 DIAGNOSIS — N18.32 STAGE 3B CHRONIC KIDNEY DISEASE: ICD-10-CM

## 2024-06-05 RX ORDER — DULAGLUTIDE 0.75 MG/.5ML
0.75 INJECTION, SOLUTION SUBCUTANEOUS WEEKLY
Qty: 2 ML | Refills: 1 | Status: SHIPPED | OUTPATIENT
Start: 2024-06-05

## 2024-06-05 NOTE — PROGRESS NOTES
-----------------------------------------------------------------  ENDOCRINE CLINIC NOTE  -----------------------------------------------------------------        PATIENT NAME: Marilyn Martins  PATIENT : 1965 AGE: 58 y.o.  MRN NUMBER: [id number]  PRIMARY CARE: Sondra Thomas APRN    ==========================================================================    CHIEF COMPLAINT: Type 2 Diabetes Mellitus and Hypothyrodism  DATE OF SERVICE: 24    ==========================================================================    HPI / SUBJECTIVE    58 y.o. female is seen in the clinic today for follow-up of type 2 Diabetes and Hypothyroidism.  Patient was recently admitted to [facility] and was treated for atrial fibrillation.  Currently on Eliquis, midodrine and metoprolol therapy.    Type 2 Diabetes Mellitus:  Diagnosed with type 2 diabetes around   Last A1c in May 2024 was 7.4%   Current therapy:  Jardiance 25 mg p.o. daily  Insulin Basaglar 27 units nightly  Insulin lispro 10 units with each meal  Medications not taking:  Ozempic due to abdominal pain and unable to eat for few days  Januvia discontinued by [place] due to heart failure  Patient was started on Trulicity therapy via prescription during the hospital stay at [facility] which she have not started yet but have filled prescription at home  Underlying history of CKD status post right nephrectomy in 2002  Currently checking blood sugar through G7 Dexcom.  Following in ophthalmology in the clinic, following at [facility]. Last visit was 2023 as per pt.  Reports to have mild neuropathy in legs.  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     Hypothyroidism:  Diagnosed with hypothyroidism in .  Current dose is 112 mcg daily.    ==========================================================================                                                 PAST MEDICAL HISTORY    Past Medical History:   Diagnosis Date    A-fib     Atrial fibrillation with rapid ventricular rate    Allergic     Anemia     Anxiety     Arthritis     Asthma     Callus     Cataract     CHF (congestive heart failure)     Cholelithiasis 1987    Gall bladder removed    Chronic constipation     Chronic diarrhea     COPD (chronic obstructive pulmonary disease) 2003    Depression     Diabetes mellitus     Diabetes mellitus     Difficulty walking     Unable to walk due to left leg amputated    Diverticulosis 2022    GERD (gastroesophageal reflux disease) 2005    Gestational diabetes     1&3 child    Hashimoto's thyroiditis     Headache     Heart murmur 2007    Sometimes heard    History of transfusion     Last done on Dec 8th 2022 for right kidney removel    HL (hearing loss) 2012    Left ear drum is busted, hole in right ear, get excess of build up in both ears    Hyperlipidemia 2000    Hypothyroidism 1986    Uncontroled    Kidney stone 2022    Left side two removed by lazer surgery,right kidney removed Dec.8th of this year.    Low back pain 2001    Neuropathy in diabetes     Obesity 1989    Osteoporosis     Pneumonia 2022 March of this year    Renal insufficiency 2016    Shortness of breath     Sinusitis     Substance abuse     Type 2 diabetes mellitus     Urinary tract infection     Visual impairment 1985       ==========================================================================    PAST SURGICAL HISTORY    Past Surgical History:   Procedure Laterality Date    ABOVE KNEE AMPUTATION Left 02/2000    ANKLE OPEN REDUCTION INTERNAL FIXATION      Before on left ankle    CHOLECYSTECTOMY  2002    COLONOSCOPY  2004    EYE SURGERY  2014    Carrects    FOOT SURGERY      Had broken left ankle before amputation    HERNIA REPAIR      HYSTERECTOMY      JOINT REPLACEMENT      SUBTOTAL HYSTERECTOMY      TOENAIL EXCISION      Right big toe nail    URETERAL STENT INSERTION          ==========================================================================    FAMILY HISTORY    Family History   Problem Relation Age of Onset    Anxiety disorder Daughter     Developmental Disability Daughter     Diabetes Daughter         Pre diabetic with first child    Learning disabilities Daughter     Mental illness Daughter         Cutter, anything to harm herself, hanging, burning etc.    Anxiety disorder Daughter     Developmental Disability Daughter     Diabetes Daughter     Learning disabilities Daughter     Mental illness Daughter         Cutters    Developmental Disability Son     Learning disabilities Son     Diabetes Mother        ==========================================================================    SOCIAL HISTORY    Social History     Socioeconomic History    Marital status:     Number of children: 3    Years of education: 12   Tobacco Use    Smoking status: Never    Smokeless tobacco: Never   Vaping Use    Vaping status: Never Used   Substance and Sexual Activity    Alcohol use: Yes     Comment: Wine coolers and sleeping pills together 1996    Drug use: Never    Sexual activity: Not Currently     Partners: Male     Birth control/protection: Hysterectomy       ==========================================================================    MEDICATIONS      Current Outpatient Medications:     Accu-Chek Softclix Lancets lancets, Use as instructed to check blood sugar before meals and at bedtime, Disp: 100 each, Rfl: 12    albuterol sulfate  (90 Base) MCG/ACT inhaler, Inhale 2 puffs every 6 hours as needed for wheezing and shortness of breath, Disp: 18 g, Rfl: 1    apixaban (ELIQUIS) 5 MG tablet tablet, Take 1 tablet by mouth Every 12 (Twelve) Hours., Disp: 60 tablet, Rfl: 3    Blood Glucose Monitoring Suppl (Accu-Chek Guide) w/Device kit, 1 each 4 (Four) Times a Day After Meals & at Bedtime., Disp: 1 kit, Rfl: 0    budesonide-formoterol (SYMBICORT) 160-4.5 MCG/ACT inhaler,  Inhale 2 puffs 2 (Two) Times a Day., Disp: 10.2 g, Rfl: 6    cetirizine (Allergy Relief Cetirizine) 10 MG tablet, Take 1 tablet by mouth Daily., Disp: 30 tablet, Rfl: 3    Cholecalciferol (VITAMIN D-3 PO), Take 1 tablet by mouth Daily., Disp: , Rfl:     Colloidal Oatmeal 1 % lotion, Apply 1 Application topically 2 (Two) Times a Day., Disp: , Rfl:     Continuous Glucose Sensor (Dexcom G7 Sensor) misc, Use 1 each Every 10 (Ten) Days., Disp: 9 each, Rfl: 3    ferrous sulfate 324 (65 Fe) MG tablet delayed-release EC tablet, Take 1 tablet by mouth Daily With Breakfast., Disp: , Rfl:     gabapentin (NEURONTIN) 300 MG capsule, Take 1 capsule by mouth 3 (Three) Times a Day., Disp: 90 capsule, Rfl: 3    glucose blood (Accu-Chek Guide) test strip, Use as instructed to test blood sugar 4 times daily, Disp: 200 each, Rfl: 5    Insulin Glargine (Lantus SoloStar) 100 UNIT/ML injection pen, Inject 32 Units under the skin into the appropriate area as directed Every Night., Disp: 15 mL, Rfl: 5    Insulin Lispro, 1 Unit Dial, (HumaLOG KwikPen) 100 UNIT/ML solution pen-injector, Inject 10 Units under the skin into the appropriate area as directed 3 (Three) Times a Day With Meals., Disp: 15 mL, Rfl: 5    Insulin Pen Needle 32G X 4 MM misc, 1 each 4 (Four) Times a Day Before Meals & at Bedtime., Disp: 200 each, Rfl: 5    Jardiance 25 MG tablet tablet, Take 1 tablet by mouth Every Morning., Disp: 90 tablet, Rfl: 1    levothyroxine (Synthroid) 112 MCG tablet, Take 1 tablet by mouth Daily for 180 days., Disp: 90 tablet, Rfl: 1    metoprolol tartrate (LOPRESSOR) 25 MG tablet, Take 1 tablet by mouth 2 (Two) Times a Day., Disp: 60 tablet, Rfl: 3    midodrine (PROAMATINE) 2.5 MG tablet, Take 1 tablet by mouth 3 (Three) Times a Day., Disp: 90 tablet, Rfl: 3    Myrbetriq 25 MG tablet sustained-release 24 hour 24 hr tablet, Take 1 tablet by mouth Daily., Disp: 30 tablet, Rfl: 0    Omega-3 Fatty Acids (fish oil) 1000 MG capsule capsule, Take  by  "mouth., Disp: , Rfl:     pravastatin (Pravachol) 20 MG tablet, Take 1 tablet by mouth Every Evening., Disp: 90 tablet, Rfl: 3    Probiotic Product (PROBIOTIC BLEND PO), Take 1 tablet by mouth Daily., Disp: , Rfl:     RELION INSULIN SYR 0.5ML/31G 31G X 5/16\" 0.5 ML misc, , Disp: , Rfl:     sodium bicarbonate 650 MG tablet, Take 1 tablet by mouth 3 (Three) Times a Day., Disp: 90 tablet, Rfl: 1    vitamin B-12 (CYANOCOBALAMIN) 100 MCG tablet, Take 0.5 tablets by mouth Daily., Disp: , Rfl:     vitamin C (ASCORBIC ACID) 250 MG tablet, Take 1 tablet by mouth Daily., Disp: , Rfl:     Dulaglutide (Trulicity) 0.75 MG/0.5ML solution pen-injector, Inject 0.75 mg under the skin into the appropriate area as directed 1 (One) Time Per Week., Disp: 2 mL, Rfl: 1    ==========================================================================    ALLERGIES    Allergies   Allergen Reactions    Latex Other (See Comments)    Morphine And Codeine Rash    Codeine Other (See Comments)    Sulfa Antibiotics Other (See Comments)    Cephalexin Diarrhea and Nausea And Vomiting     Extreme vomiting, can't keep food or water down either    Povidone Iodine Rash       ==========================================================================    OBJECTIVE    Vitals:    06/05/24 0840   BP: 116/60   Pulse: 69   SpO2: 97%     Body mass index is 49.35 kg/m².     General: Alert, cooperative, no acute distress  Thyroid:  no enlargement/tenderness/palpable nodules  Lungs: Clear to auscultation bilaterally, respirations unlabored  Heart: Regular rate and rhythm, S1 and S2 normal, no murmur, rub or gallop  Abdomen: Soft, NT, ND and Bowel sounds Positive    ==========================================================================    LAB EVALUATION    Lab Results   Component Value Date    GLUCOSE 131 (H) 03/27/2024    BUN 31 (H) 03/27/2024    CREATININE 1.76 (H) 03/27/2024    EGFRIFAFRI 32 (L) 12/09/2022    BCR 18 03/27/2024    K 5.0 03/27/2024    CO2 25 " 03/27/2024    CALCIUM 9.4 03/27/2024    PROTENTOTREF 8.4 03/27/2024    ALBUMIN 3.6 (L) 03/27/2024    LABIL2 0.8 (L) 03/27/2024    AST 17 03/27/2024    ALT 12 03/27/2024    CHOL 166 07/11/2023    TRIG 143 12/28/2023    HDL 27 (L) 12/28/2023    LDL 90 12/28/2023     Lab Results   Component Value Date    HGBA1C 7.4 (H) 05/09/2024    HGBA1C 7.9 (H) 03/27/2024    HGBA1C 8.1 (H) 12/28/2023     Lab Results   Component Value Date    MICROALBUR 51.1 12/28/2023    CREATININE 1.76 (H) 03/27/2024     Lab Results   Component Value Date    TSH 0.444 (L) 03/27/2024    FREET4 1.43 03/27/2024     ==========================================================================    CGM Data:    Dates: May 23 till June 5, 2024    Very High > 250: 3%  High 180 - 250: 40%  Target: 70 - 180: 57%  Low 55 - 70:  0%  Very Low < 55: 0%    Average blood sugar 180 with standard deviation of 34 and days active was 64%.    ==========================================================================    ASSESSMENT AND PLAN    # Type 2 diabetes with hyperglycemia  # Morbid obesity with BMI 49.35  # Peripheral diabetic neuropathy   # CKD stage III with microalbuminuria, following nephrology as outpatient with Dr. Mccauley    -Reviewed CGM data and patient had persistent hyperglycemia with no evidence of hypoglycemia  - Hypoglycemia seems to be throughout the day without significant fluctuations  - Patient was recently admitted the hospital and was treated for atrial fibrillation with RVR with possible underlying congestive heart failure therefore will discontinue Januvia therapy while patient continues to be on Jardiance, Basaglar and lispro therapy  - Patient was offered Trulicity therapy during hospitalization which she have currently full prescription but have not started yet  - Patient had previously tried Ozempic therapy but was not able to tolerate  - Discussed with patient about trying the Trulicity therapy since it is more beneficial than DPP 4  "inhibitor therapy, after detailed discussion plan is to try for at least 4 weeks time and monitor response, if patient continues to tolerate can keep on the same dosing with the possibility of up titration  - After the last visit patient also tried to uptitrate the Basaglar to 30 units but reports that she was having hypoglycemia and therefore she continued 27 units  - New adjusted therapy will remain as:  Jardiance 25 mg p.o. daily  Insulin Basaglar 30 units nightly, if needed can go up to 32 units  Insulin lispro 10 units with each meal  Trulicity 0.75 mgs once a week  - Continue to monitor blood sugar through Dexcom G7, patient is able to keep that stable    # Hypothyroidism with iatrogenic hyperthyroidism  - Continue levothyroxine 1 one 2 mcg p.o. daily and repeat blood work before next visit    # Hyperlipidemia  - Continue pravastatin 20 mg    Return to clinic: 4 weeks    Entire assessment and plan was discussed and counseled the patient in detail to which patient verbalized understanding and agreed with care.  Answered all queries and concerns.    This note was created using voice recognition software and is inherently subject to errors including those of syntax and \"sound-alike\" substitutions which may escape proofreading.  In such instances, original meaning may be extrapolated by contextual derivation.    Note: Portions of this note may have been copied from previous notes but documentation have been reviewed and edited as necessary to support clinical decision making for today's visit.    ==========================================================================    INFORMATION PROVIDED TO PATIENT    Patient Instructions   Please,     - Continue Jardiance 25 mgs once a day    - Continue Basaglar 27 units once every night.    - Use mealtime insulin 10 units with each meal with no correction scale.    Mealtime insulin needs to be taking 15 minutes before each meal.  Please check your blood sugar before giving " mealtime insulin.    - Start Trulicity 0.75 mgs once a week    - Continue to check your blood sugar through Dexcom G7.     If your blood sugar is running less than 100 and fasting then decrease your nighttime insulin by 3 units.    If your blood sugar before lunch, before dinner and before bedtime snack is less than 100 then decrease your mealtime insulin by 2 units.     Continue pravastatin therapy to 20 mg 1 pill by mouth daily.     For levothyroxine continue 112 mcg 1 pill by mouth early in the morning at fasting state and maintain fasting for at least 40 minutes.     Repeat blood work before next visit in 4 weeks time    Thank you for your visit today.     If you have any questions or concerns please feel free to reach out of the office.      ==========================================================================  Ray Narvaez MD  Department of Endocrine, Diabetes and Metabolism  [place], [place]  ==========================================================================

## 2024-06-05 NOTE — PATIENT INSTRUCTIONS
Please,     - Continue Jardiance 25 mgs once a day    - Continue Basaglar 27 units once every night.    - Use mealtime insulin 10 units with each meal with no correction scale.    Mealtime insulin needs to be taking 15 minutes before each meal.  Please check your blood sugar before giving mealtime insulin.    - Start Trulicity 0.75 mgs once a week    - Continue to check your blood sugar through Dexcom G7.     If your blood sugar is running less than 100 and fasting then decrease your nighttime insulin by 3 units.    If your blood sugar before lunch, before dinner and before bedtime snack is less than 100 then decrease your mealtime insulin by 2 units.     Continue pravastatin therapy to 20 mg 1 pill by mouth daily.     For levothyroxine continue 112 mcg 1 pill by mouth early in the morning at fasting state and maintain fasting for at least 40 minutes.     Repeat blood work before next visit in 4 weeks time    Thank you for your visit today.     If you have any questions or concerns please feel free to reach out of the office.

## 2024-06-11 ENCOUNTER — SPECIALTY PHARMACY (OUTPATIENT)
Dept: ENDOCRINOLOGY | Facility: CLINIC | Age: 59
End: 2024-06-11
Payer: MEDICAID

## 2024-06-11 NOTE — PROGRESS NOTES
"Specialty Pharmacy Refill Coordination Note     Marilyn \"Delphine\" is a 58 y.o. female contacted today regarding refills of her specialty medication(s).    Specialty medication(s) and dose(s) confirmed: yes  Changes to medications: no  Changes to insurance: no  Reviewed and verified with patient:         Refill Questions      Flowsheet Row Most Recent Value   Changes to allergies? No   Changes to medications? No   New conditions or infections since last clinic visit No   Unplanned office visit, urgent care, ED, or hospital admission in the last 4 weeks  No   How does patient/caregiver feel medication is working? Good   Financial problems or insurance changes  No   Since the previous refill, were any specialty medication doses or scheduled injections missed or delayed?  No   Does this patient require a clinical escalation to a pharmacist? No            Delivery Questions      Flowsheet Row Most Recent Value   Delivery method  at Pharmacy   Number of medications in delivery 1   Medication(s) being filled and delivered Continuous Glucose Sensor   Copay verified? Yes   Copay amount 0   Copay form of payment No copayment ($0)                 Follow-up: 1 month(s)     Shirley Arellano, Pharmacy Technician  Specialty Pharmacy Technician   6/11/2024   09:09 EDT      "

## 2024-06-19 ENCOUNTER — TELEPHONE (OUTPATIENT)
Dept: ENDOCRINOLOGY | Facility: CLINIC | Age: 59
End: 2024-06-19
Payer: MEDICAID

## 2024-06-19 DIAGNOSIS — E11.69 TYPE 2 DIABETES MELLITUS WITH OTHER SPECIFIED COMPLICATION, WITH LONG-TERM CURRENT USE OF INSULIN: ICD-10-CM

## 2024-06-19 DIAGNOSIS — E11.42 DIABETIC PERIPHERAL NEUROPATHY: ICD-10-CM

## 2024-06-19 DIAGNOSIS — E11.65 TYPE 2 DIABETES MELLITUS WITH HYPERGLYCEMIA, WITH LONG-TERM CURRENT USE OF INSULIN: ICD-10-CM

## 2024-06-19 DIAGNOSIS — R35.1 FREQUENT URINATION AT NIGHT: ICD-10-CM

## 2024-06-19 DIAGNOSIS — Z79.4 TYPE 2 DIABETES MELLITUS WITH HYPERGLYCEMIA, WITH LONG-TERM CURRENT USE OF INSULIN: ICD-10-CM

## 2024-06-19 DIAGNOSIS — Z79.4 TYPE 2 DIABETES MELLITUS WITH OTHER SPECIFIED COMPLICATION, WITH LONG-TERM CURRENT USE OF INSULIN: ICD-10-CM

## 2024-06-19 RX ORDER — MIRABEGRON 25 MG/1
25 TABLET, FILM COATED, EXTENDED RELEASE ORAL DAILY
Qty: 30 TABLET | Refills: 0 | Status: SHIPPED | OUTPATIENT
Start: 2024-06-19

## 2024-06-19 RX ORDER — EMPAGLIFLOZIN 25 MG/1
25 TABLET, FILM COATED ORAL EVERY MORNING
Qty: 90 TABLET | Refills: 1 | Status: SHIPPED | OUTPATIENT
Start: 2024-06-19

## 2024-06-19 RX ORDER — BLOOD SUGAR DIAGNOSTIC
STRIP MISCELLANEOUS
Qty: 400 EACH | Refills: 1 | Status: SHIPPED | OUTPATIENT
Start: 2024-06-19

## 2024-06-19 NOTE — TELEPHONE ENCOUNTER
Specialty Pharmacy Patient Management Program       Marilyn Martins is a 58 y.o. female seen by an Endocrinology provider for Type 2 Diabetes and enrolled in the Endocrinology Patient Management program offered by [facility].      Requested Prescriptions     Pending Prescriptions Disp Refills    glucose blood (Accu-Chek Guide) test strip 400 each 1     Sig: Use as instructed to test blood sugar 4 times daily    Jardiance 25 MG tablet tablet 90 tablet 1     Sig: Take 1 tablet by mouth Every Morning.       Refill sent in to patient's pharmacy for above medication prescribed by Dr. Narvaez.   Last office visit 4/5/2024.  Next visit scheduled 7/12/2024.      Juaquin Yu, PharmD, MPH  Clinical Specialty Pharmacist, Endocrinology  6/19/2024  11:02 EDT

## 2024-06-21 ENCOUNTER — SPECIALTY PHARMACY (OUTPATIENT)
Dept: ENDOCRINOLOGY | Facility: CLINIC | Age: 59
End: 2024-06-21
Payer: MEDICAID

## 2024-06-21 NOTE — PROGRESS NOTES
"Specialty Pharmacy Refill Coordination Note     Marilyn \"Delphine\" is a 58 y.o. female contacted today regarding refills of her specialty medication(s).    Specialty medication(s) and dose(s) confirmed: yes  Changes to medications: no  Changes to insurance: no  Reviewed and verified with patient:         Refill Questions      Flowsheet Row Most Recent Value   Changes to allergies? No   Changes to medications? No   New conditions or infections since last clinic visit No   Unplanned office visit, urgent care, ED, or hospital admission in the last 4 weeks  No   How does patient/caregiver feel medication is working? Good   Financial problems or insurance changes  No   Since the previous refill, were any specialty medication doses or scheduled injections missed or delayed?  No   Does this patient require a clinical escalation to a pharmacist? No            Delivery Questions      Flowsheet Row Most Recent Value   Delivery method  at Pharmacy   Number of medications in delivery 7   Medication(s) being filled and delivered Lancets, Glucose Blood, Insulin Pen Needle, Cetirizine Hcl (Antihistamines - Non-Sedating), Gabapentin (Anticonvulsants - Misc.), Empagliflozin, Mirabegron   Copay verified? Yes   Copay amount 0   Copay form of payment No copayment ($0)                 Follow-up: 1 month(s)     Shirley Arellano, Pharmacy Technician  Specialty Pharmacy Technician   6/21/2024   08:15 EDT      "

## 2024-07-04 LAB
ALBUMIN SERPL-MCNC: 3.5 G/DL (ref 3.8–4.9)
ALP SERPL-CCNC: 118 IU/L (ref 44–121)
ALT SERPL-CCNC: 13 IU/L (ref 0–32)
AMBIG ABBREV CMP14 DEFAULT: NORMAL
AMBIG ABBREV LP DEFAULT: NORMAL
AST SERPL-CCNC: 12 IU/L (ref 0–40)
BILIRUB SERPL-MCNC: <0.2 MG/DL (ref 0–1.2)
BUN SERPL-MCNC: 30 MG/DL (ref 8–27)
BUN/CREAT SERPL: 22 (ref 12–28)
CALCIUM SERPL-MCNC: 9 MG/DL (ref 8.7–10.3)
CHLORIDE SERPL-SCNC: 100 MMOL/L (ref 96–106)
CHOLEST SERPL-MCNC: 119 MG/DL (ref 100–199)
CO2 SERPL-SCNC: 24 MMOL/L (ref 20–29)
CREAT SERPL-MCNC: 1.38 MG/DL (ref 0.57–1)
EGFRCR SERPLBLD CKD-EPI 2021: 44 ML/MIN/1.73
GLOBULIN SER CALC-MCNC: 3.9 G/DL (ref 1.5–4.5)
GLUCOSE SERPL-MCNC: 149 MG/DL (ref 70–99)
HBA1C MFR BLD: 7 % (ref 4.8–5.6)
HDLC SERPL-MCNC: 25 MG/DL
LDLC SERPL CALC-MCNC: 61 MG/DL (ref 0–99)
POTASSIUM SERPL-SCNC: 4.5 MMOL/L (ref 3.5–5.2)
PROT SERPL-MCNC: 7.4 G/DL (ref 6–8.5)
SODIUM SERPL-SCNC: 136 MMOL/L (ref 134–144)
T4 FREE SERPL-MCNC: 0.97 NG/DL (ref 0.82–1.77)
TRIGL SERPL-MCNC: 196 MG/DL (ref 0–149)
TSH SERPL DL<=0.005 MIU/L-ACNC: 5.37 UIU/ML (ref 0.45–4.5)
VLDLC SERPL CALC-MCNC: 33 MG/DL (ref 5–40)

## 2024-07-08 ENCOUNTER — SPECIALTY PHARMACY (OUTPATIENT)
Dept: ENDOCRINOLOGY | Facility: CLINIC | Age: 59
End: 2024-07-08
Payer: MEDICAID

## 2024-07-08 ENCOUNTER — TELEPHONE (OUTPATIENT)
Dept: FAMILY MEDICINE CLINIC | Facility: CLINIC | Age: 59
End: 2024-07-08
Payer: MEDICAID

## 2024-07-08 NOTE — PROGRESS NOTES
"Specialty Pharmacy Refill Coordination Note     Marilyn \"Delphine\" is a 58 y.o. female contacted today regarding refills of her specialty medication(s).    Specialty medication(s) and dose(s) confirmed: yes  Changes to medications: no  Changes to insurance: no  Reviewed and verified with patient:         Refill Questions      Flowsheet Row Most Recent Value   Changes to allergies? No   Changes to medications? No   New conditions or infections since last clinic visit No   Unplanned office visit, urgent care, ED, or hospital admission in the last 4 weeks  No   How does patient/caregiver feel medication is working? Good   Financial problems or insurance changes  No   Since the previous refill, were any specialty medication doses or scheduled injections missed or delayed?  No   Does this patient require a clinical escalation to a pharmacist? No            Delivery Questions      Flowsheet Row Most Recent Value   Delivery method  at Pharmacy   Number of medications in delivery 9   Medication(s) being filled and delivered Continuous Glucose Sensor, Apixaban, Insulin Glargine, Insulin Lispro, Levothyroxine Sodium (Thyroid Hormones), Metoprolol Tartrate, Midodrine Hcl, Dulaglutide, Pravastatin Sodium   Copay verified? Yes   Copay amount 0   Copay form of payment No copayment ($0)   Signature Required No                 Follow-up: 1 month(s)     Shirley Arellano, Pharmacy Technician  Specialty Pharmacy Technician   7/8/2024   09:33 EDT      "

## 2024-07-15 DIAGNOSIS — R35.1 FREQUENT URINATION AT NIGHT: ICD-10-CM

## 2024-07-15 RX ORDER — MIRABEGRON 25 MG/1
25 TABLET, FILM COATED, EXTENDED RELEASE ORAL DAILY
Qty: 30 TABLET | Refills: 0 | Status: SHIPPED | OUTPATIENT
Start: 2024-07-15

## 2024-07-17 ENCOUNTER — SPECIALTY PHARMACY (OUTPATIENT)
Dept: ENDOCRINOLOGY | Facility: CLINIC | Age: 59
End: 2024-07-17
Payer: MEDICAID

## 2024-07-17 NOTE — PROGRESS NOTES
"Specialty Pharmacy Refill Coordination Note     Marilyn \"Delphine\" is a 58 y.o. female contacted today regarding refills of her specialty medication(s).    Specialty medication(s) and dose(s) confirmed: yes  Changes to medications: no  Changes to insurance: no  Reviewed and verified with patient:         Refill Questions      Flowsheet Row Most Recent Value   Changes to allergies? No   Changes to medications? No   New conditions or infections since last clinic visit No   Unplanned office visit, urgent care, ED, or hospital admission in the last 4 weeks  No   How does patient/caregiver feel medication is working? Good   Financial problems or insurance changes  No   Since the previous refill, were any specialty medication doses or scheduled injections missed or delayed?  No   Does this patient require a clinical escalation to a pharmacist? No            Delivery Questions      Flowsheet Row Most Recent Value   Delivery method  at Pharmacy   Number of medications in delivery 3   Medication(s) being filled and delivered Cetirizine Hcl (Antihistamines - Non-Sedating), Gabapentin (Anticonvulsants - Misc.)   Copay verified? Yes   Copay amount 0   Copay form of payment No copayment ($0)                 Follow-up: 1 month(s)     Shirley Arellano, Pharmacy Technician  Specialty Pharmacy Technician   7/17/2024   11:16 EDT      "

## 2024-07-31 ENCOUNTER — TELEPHONE (OUTPATIENT)
Dept: ENDOCRINOLOGY | Facility: CLINIC | Age: 59
End: 2024-07-31
Payer: MEDICAID

## 2024-07-31 RX ORDER — DULAGLUTIDE 0.75 MG/.5ML
0.75 INJECTION, SOLUTION SUBCUTANEOUS WEEKLY
Qty: 6 ML | Refills: 1 | Status: SHIPPED | OUTPATIENT
Start: 2024-07-31

## 2024-07-31 NOTE — TELEPHONE ENCOUNTER
Specialty Pharmacy Patient Management Program       Marilyn Martins is a 59 y.o. female seen by an Endocrinology provider for Type 2 Diabetes and enrolled in the Endocrinology Patient Management program offered by [facility].      Requested Prescriptions     Pending Prescriptions Disp Refills    Dulaglutide (Trulicity) 0.75 MG/0.5ML solution pen-injector 6 mL 1     Sig: Inject 0.75 mg under the skin into the appropriate area as directed 1 (One) Time Per Week.    Insulin Pen Needle 32G X 4 MM misc 400 each 1     Sig: Use 1 each 4 (Four) Times a Day Before Meals & at Bedtime.       Refill sent in to patient's pharmacy for above medication prescribed by Dr. Narvaez.   Last office visit 6/5/2024.  Next visit scheduled 9/30/2024.      Juaquin Yu, PharmD, MPH  Clinical Specialty Pharmacist, Endocrinology  7/31/2024  11:55 EDT

## 2024-08-01 ENCOUNTER — TELEPHONE (OUTPATIENT)
Dept: ENDOCRINOLOGY | Facility: CLINIC | Age: 59
End: 2024-08-01
Payer: MEDICAID

## 2024-08-01 RX ORDER — GLUCOSAM/CHON-MSM1/C/MANG/BOSW 500-416.6
1 TABLET ORAL 4 TIMES DAILY
Qty: 400 EACH | Refills: 1 | Status: SHIPPED | OUTPATIENT
Start: 2024-08-01

## 2024-08-01 RX ORDER — LANCING DEVICE
1 EACH MISCELLANEOUS 4 TIMES DAILY
Qty: 1 EACH | Refills: 0 | Status: SHIPPED | OUTPATIENT
Start: 2024-08-01

## 2024-08-01 NOTE — TELEPHONE ENCOUNTER
Specialty Pharmacy Patient Management Program       Marilyn Martins is a 59 y.o. female seen by an Endocrinology provider for Type 2 Diabetes and enrolled in the Endocrinology Patient Management program offered by [facility].      Requested Prescriptions     Pending Prescriptions Disp Refills    Lancet Devices (TRUEdraw Lancing Device) misc 1 each 0     Sig: Use 1 each 4 (Four) Times a Day.    TRUEplus Lancets 30G misc 400 each 1     Sig: Use 1 each 4 (Four) Times a Day. Before meals and at bedtime.       Refill sent in to patient's pharmacy for above medication prescribed by Dr. Narvaez.   Last office visit 6/5/2024.  Next visit scheduled 9/30/2024.      Juaquin Yu, PharmD, MPH  Clinical Specialty Pharmacist, Endocrinology  8/1/2024  11:46 EDT

## 2024-08-02 DIAGNOSIS — N18.32 CHRONIC KIDNEY DISEASE (CKD) STAGE G3B/A1, MODERATELY DECREASED GLOMERULAR FILTRATION RATE (GFR) BETWEEN 30-44 ML/MIN/1.73 SQUARE METER AND ALBUMINURIA CREATININE RATIO LESS THAN 30 MG/G (CMS/H*: ICD-10-CM

## 2024-08-04 LAB
25(OH)D3+25(OH)D2 SERPL-MCNC: 39.9 NG/ML (ref 30–100)
ALBUMIN SERPL-MCNC: 3.8 G/DL (ref 3.9–4.9)
ALP SERPL-CCNC: 123 IU/L (ref 44–121)
ALT SERPL-CCNC: 13 IU/L (ref 0–32)
AST SERPL-CCNC: 12 IU/L (ref 0–40)
BASOPHILS # BLD AUTO: 0.1 X10E3/UL (ref 0–0.2)
BASOPHILS NFR BLD AUTO: 1 %
BILIRUB SERPL-MCNC: <0.2 MG/DL (ref 0–1.2)
BUN SERPL-MCNC: 37 MG/DL (ref 8–27)
BUN/CREAT SERPL: 25 (ref 12–28)
CALCIUM SERPL-MCNC: 9 MG/DL (ref 8.7–10.3)
CHLORIDE SERPL-SCNC: 97 MMOL/L (ref 96–106)
CK MB SERPL-MCNC: 1.8 NG/ML (ref 0–5.3)
CK SERPL-CCNC: 112 U/L (ref 32–182)
CO2 SERPL-SCNC: 23 MMOL/L (ref 20–29)
CREAT SERPL-MCNC: 1.49 MG/DL (ref 0.57–1)
CREAT UR-MCNC: 49.7 MG/DL
EGFRCR SERPLBLD CKD-EPI 2021: 40 ML/MIN/1.73
EOSINOPHIL # BLD AUTO: 0.3 X10E3/UL (ref 0–0.4)
EOSINOPHIL NFR BLD AUTO: 3 %
ERYTHROCYTE [DISTWIDTH] IN BLOOD BY AUTOMATED COUNT: 15.2 % (ref 11.7–15.4)
GLOBULIN SER CALC-MCNC: 3.8 G/DL (ref 1.5–4.5)
GLUCOSE SERPL-MCNC: 170 MG/DL (ref 70–99)
HBA1C MFR BLD: 7.4 % (ref 4.8–5.6)
HCT VFR BLD AUTO: 42.4 % (ref 34–46.6)
HGB BLD-MCNC: 13 G/DL (ref 11.1–15.9)
IMM GRANULOCYTES # BLD AUTO: 0 X10E3/UL (ref 0–0.1)
IMM GRANULOCYTES NFR BLD AUTO: 0 %
LYMPHOCYTES # BLD AUTO: 2.2 X10E3/UL (ref 0.7–3.1)
LYMPHOCYTES NFR BLD AUTO: 22 %
MAGNESIUM SERPL-MCNC: 1.9 MG/DL (ref 1.6–2.3)
MCH RBC QN AUTO: 27.5 PG (ref 26.6–33)
MCHC RBC AUTO-ENTMCNC: 30.7 G/DL (ref 31.5–35.7)
MCV RBC AUTO: 90 FL (ref 79–97)
MONOCYTES # BLD AUTO: 0.9 X10E3/UL (ref 0.1–0.9)
MONOCYTES NFR BLD AUTO: 8 %
NEUTROPHILS # BLD AUTO: 6.7 X10E3/UL (ref 1.4–7)
NEUTROPHILS NFR BLD AUTO: 66 %
PHOSPHATE SERPL-MCNC: 3.6 MG/DL (ref 3–4.3)
PLATELET # BLD AUTO: 286 X10E3/UL (ref 150–450)
POTASSIUM SERPL-SCNC: 4.3 MMOL/L (ref 3.5–5.2)
PROT SERPL-MCNC: 7.6 G/DL (ref 6–8.5)
PROT UR-MCNC: 12.6 MG/DL
PROT/CREAT UR: 254 MG/G CREAT (ref 0–200)
PTH-INTACT SERPL-MCNC: 32 PG/ML (ref 15–65)
RBC # BLD AUTO: 4.73 X10E6/UL (ref 3.77–5.28)
SODIUM SERPL-SCNC: 137 MMOL/L (ref 134–144)
URATE SERPL-MCNC: 5.4 MG/DL (ref 3–7.2)
WBC # BLD AUTO: 10.2 X10E3/UL (ref 3.4–10.8)

## 2024-08-05 ENCOUNTER — SPECIALTY PHARMACY (OUTPATIENT)
Dept: ENDOCRINOLOGY | Facility: CLINIC | Age: 59
End: 2024-08-05
Payer: MEDICAID

## 2024-08-05 NOTE — PROGRESS NOTES
"Specialty Pharmacy Refill Coordination Note     Marilyn \"Delphine\" is a 59 y.o. female contacted today regarding refills of her specialty medication(s).    Specialty medication(s) and dose(s) confirmed: yes  Changes to medications: no  Changes to insurance: no  Reviewed and verified with patient:         Refill Questions      Flowsheet Row Most Recent Value   Changes to allergies? No   Changes to medications? No   New conditions or infections since last clinic visit No   Unplanned office visit, urgent care, ED, or hospital admission in the last 4 weeks  No   How does patient/caregiver feel medication is working? Good   Financial problems or insurance changes  No   Since the previous refill, were any specialty medication doses or scheduled injections missed or delayed?  No   Does this patient require a clinical escalation to a pharmacist? No            Delivery Questions      Flowsheet Row Most Recent Value   Delivery method  at Pharmacy   Number of medications in delivery 9   Medication(s) being filled and delivered Insulin Pen Needle, Lancets, Continuous Glucose Sensor, Apixaban, Metoprolol Tartrate, Midodrine Hcl, Dulaglutide, Glucose Blood, Lancet Devices   Copay verified? Yes   Copay amount 0   Copay form of payment No copayment ($0)   Signature Required Yes                 Follow-up: 1 month(s)     Shirley Arellano, Pharmacy Technician  Specialty Pharmacy Technician   8/5/2024   12:54 EDT      "

## 2024-08-12 DIAGNOSIS — R35.1 FREQUENT URINATION AT NIGHT: ICD-10-CM

## 2024-08-12 RX ORDER — MIRABEGRON 25 MG/1
25 TABLET, FILM COATED, EXTENDED RELEASE ORAL DAILY
Qty: 30 TABLET | Refills: 0 | Status: SHIPPED | OUTPATIENT
Start: 2024-08-12

## 2024-08-12 RX ORDER — GABAPENTIN 300 MG/1
300 CAPSULE ORAL 3 TIMES DAILY
Qty: 90 CAPSULE | Refills: 3 | Status: SHIPPED | OUTPATIENT
Start: 2024-08-12

## 2024-08-12 NOTE — TELEPHONE ENCOUNTER
Rx Refill Note  Requested Prescriptions     Pending Prescriptions Disp Refills   • gabapentin (NEURONTIN) 300 MG capsule 90 capsule 3     Sig: Take 1 capsule by mouth 3 (Three) Times a Day.      Last office visit with prescribing clinician: 5/29/2024   Last telemedicine visit with prescribing clinician: Visit date not found   Next office visit with prescribing clinician: 8/29/2024       Would you like a call back once the refill request has been completed: [] Yes [] No    If the office needs to give you a call back, can they leave a voicemail: [] Yes [] No    Queta Rao MA  08/12/24, 11:54 EDT

## 2024-08-13 ENCOUNTER — SPECIALTY PHARMACY (OUTPATIENT)
Dept: ENDOCRINOLOGY | Facility: CLINIC | Age: 59
End: 2024-08-13
Payer: MEDICAID

## 2024-08-13 NOTE — PROGRESS NOTES
"Specialty Pharmacy Refill Coordination Note     Marilyn \"Delphine\" is a 59 y.o. female contacted today regarding refills of her specialty medication(s).    Specialty medication(s) and dose(s) confirmed: yes  Changes to medications: no  Changes to insurance: no  Reviewed and verified with patient:         Refill Questions      Flowsheet Row Most Recent Value   Changes to allergies? No   Changes to medications? No   New conditions or infections since last clinic visit No   Unplanned office visit, urgent care, ED, or hospital admission in the last 4 weeks  No   How does patient/caregiver feel medication is working? Good   Financial problems or insurance changes  No   Since the previous refill, were any specialty medication doses or scheduled injections missed or delayed?  No   Does this patient require a clinical escalation to a pharmacist? No            Delivery Questions      Flowsheet Row Most Recent Value   Delivery method  at Pharmacy   Number of medications in delivery 3   Medication(s) being filled and delivered Cetirizine Hcl (Antihistamines - Non-Sedating), Mirabegron, Gabapentin (Anticonvulsants - Misc.)   Copay verified? Yes   Copay amount 0   Copay form of payment No copayment ($0)   Signature Required No                 Follow-up: 1 month(s)     Shirley Arellano, Pharmacy Technician  Specialty Pharmacy Technician   8/13/2024   13:50 EDT    y  "

## 2024-08-21 ENCOUNTER — SPECIALTY PHARMACY (OUTPATIENT)
Dept: ENDOCRINOLOGY | Facility: CLINIC | Age: 59
End: 2024-08-21
Payer: MEDICAID

## 2024-08-21 NOTE — PROGRESS NOTES
"Specialty Pharmacy Refill Coordination Note     Marilyn \"Delphine\" is a 59 y.o. female contacted today regarding refills of her specialty medication(s).    Specialty medication(s) and dose(s) confirmed: yes  Changes to medications: no  Changes to insurance: no  Reviewed and verified with patient:         Refill Questions      Flowsheet Row Most Recent Value   Changes to allergies? No   Changes to medications? No   New conditions or infections since last clinic visit No   Unplanned office visit, urgent care, ED, or hospital admission in the last 4 weeks  No   How does patient/caregiver feel medication is working? Good   Financial problems or insurance changes  No   Since the previous refill, were any specialty medication doses or scheduled injections missed or delayed?  No   Does this patient require a clinical escalation to a pharmacist? No            Delivery Questions      Flowsheet Row Most Recent Value   Delivery method  at Pharmacy   Number of medications in delivery 2   Medication(s) being filled and delivered Insulin Lispro, Insulin Glargine   Copay verified? Yes   Copay amount 0   Copay form of payment No copayment ($0)   Signature Required No                 Follow-up: 40 day(s)     Shirley Arellano, Pharmacy Technician  Specialty Pharmacy Technician   8/21/2024   11:11 EDT      "

## 2024-08-28 ENCOUNTER — SPECIALTY PHARMACY (OUTPATIENT)
Dept: ENDOCRINOLOGY | Facility: CLINIC | Age: 59
End: 2024-08-28
Payer: MEDICAID

## 2024-08-28 NOTE — PROGRESS NOTES
Specialty Pharmacy Patient Management Program  Endocrinology Medication Addition Assessment     Marilyn Martins was referred by an Endocrinology provider to the Endocrinology Patient Management Program offered by [facility] for Type 2 Diabetes. This assessment was conducted as a result of a specialty medication addition or substitution. The patient was previously introduced to services offered by [facility], including: regular assessments, refill coordination, curbside pick-up or mail order delivery options, prior authorization maintenance, and financial assistance programs as applicable. The patient was also provided with contact information for the pharmacy team.      An initial outreach was conducted, including assessment of therapy appropriateness and specialty medication education for Insulin Glargine and Trulicity.     A follow-up outreach was conducted, including assessment of continued therapy appropriateness, medication adherence, and side effect incidence and management for Jardiance.    Ins[place] & Financial Support  [place]     Relevant Past Medical History and Comorbidities  Relevant medical history and concomitant health conditions were discussed with the patient. The patient's chart has been reviewed for relevant past medical history and comorbid conditions and updated as necessary.  Past Medical History:   Diagnosis Date    A-fib     Atrial fibrillation with rapid ventricular rate    Allergic     Anemia     Anxiety     Arthritis     Asthma     Callus     Cataract     CHF (congestive heart failure)     Cholelithiasis 1987    Gall bladder removed    Chronic constipation     Chronic diarrhea     COPD (chronic obstructive pulmonary disease) 2003    Depression     Diabetes mellitus     Diabetes mellitus     Difficulty walking     Unable to walk due to left leg amputated    Diverticulosis 2022    GERD (gastroesophageal reflux disease) 2005     Gestational diabetes     1&3 child    Hashimoto's thyroiditis     Headache     Heart murmur 2007    Sometimes heard    History of transfusion     Last done on Dec 8th 2022 for right kidney removel    HL (hearing loss) 2012    Left ear drum is busted, hole in right ear, get excess of build up in both ears    Hyperlipidemia 2000    Hypothyroidism 1986    Uncontroled    Kidney stone 2022    Left side two removed by lazer surgery,right kidney removed Dec.8th of this year.    Low back pain 2001    Neuropathy in diabetes     Obesity 1989    Osteoporosis     Pneumonia 2022 March of this year    Renal insufficiency 2016    Shortness of breath     Sinusitis     Substance abuse     Type 2 diabetes mellitus     Urinary tract infection     Visual impairment 1985     Social History     Socioeconomic History    Marital status:     Number of children: 3    Years of education: 12   Tobacco Use    Smoking status: Never    Smokeless tobacco: Never   Vaping Use    Vaping status: Never Used   Substance and Sexual Activity    Alcohol use: Yes     Comment: Wine coolers and sleeping pills together 1996    Drug use: Never    Sexual activity: Not Currently     Partners: Male     Birth control/protection: Hysterectomy       Problem list reviewed by Juaquin Yu, PharmD on 8/28/2024 at  1:03 PM    Hospitalizations and Urgent Care Since Last Assessment  ED Visits, Admissions, or Hospitalizations: None  Urgent Office Visits: None    Allergies  Known allergies and reactions were discussed with the patient. The patient's chart has been reviewed for  allergy information and updated as necessary.   Allergies   Allergen Reactions    Latex Other (See Comments)    Morphine And Codeine Rash    Codeine Other (See Comments)    Sulfa Antibiotics Other (See Comments)    Cephalexin Diarrhea and Nausea And Vomiting     Extreme vomiting, can't keep food or water down either    Povidone Iodine Rash       Allergies reviewed by Juaquin Yu,  PharmD on 8/28/2024 at  1:03 PM    Relevant Laboratory Values  Relevant laboratory values were discussed with the patient. The following specialty medication dose adjustment(s) are recommended: Med Addition Assessment - Continuation of current Insulin Glargine and Trulicity therapies, but will now be managed officially through [facility].  Will continue to follow.    A1C Last 3 Results          5/9/2024    05:56 7/3/2024    09:54 8/3/2024    08:34   HGBA1C Last 3 Results   Hemoglobin A1C 7.4     7.0  7.4       Details          This result is from an external source.             Lab Results   Component Value Date    HGBA1C 7.4 (H) 08/03/2024     Lab Results   Component Value Date    GLUCOSE 170 (H) 08/03/2024    CALCIUM 9.0 08/03/2024     08/03/2024    K 4.3 08/03/2024    CO2 23 08/03/2024    CL 97 08/03/2024    BUN 37 (H) 08/03/2024    CREATININE 1.49 (H) 08/03/2024    EGFRIFAFRI 32 (L) 12/09/2022    BCR 25 08/03/2024    ANIONGAP 13.2 04/10/2023     Lab Results   Component Value Date    CHOL 166 07/11/2023    CHLPL 119 07/03/2024    TRIG 196 (H) 07/03/2024    HDL 25 (L) 07/03/2024    LDL 61 07/03/2024     Microalbumin          12/28/2023    08:14   Microalbumin   Microalbumin, Urine 51.1        Current Medication List  This medication list has been reviewed with the patient and evaluated for any interactions or necessary modifications/recommendations, and updated to include all prescription medications, OTC medications, and supplements the patient is currently taking.  This list reflects what is contained in the patient's profile, which has also been marked as reviewed to communicate to other providers it is the most up to date version of the patient's current medication therapy.     Current Outpatient Medications:     albuterol sulfate  (90 Base) MCG/ACT inhaler, Inhale 2 puffs every 6 hours as needed for wheezing and shortness of breath, Disp: 18 g, Rfl: 1    apixaban (ELIQUIS) 5 MG tablet tablet,  Take 1 tablet by mouth Every 12 (Twelve) Hours., Disp: 60 tablet, Rfl: 3    Blood Glucose Monitoring Suppl (Accu-Chek Guide) w/Device kit, 1 each 4 (Four) Times a Day After Meals & at Bedtime., Disp: 1 kit, Rfl: 0    budesonide-formoterol (SYMBICORT) 160-4.5 MCG/ACT inhaler, Inhale 2 puffs 2 (Two) Times a Day., Disp: 10.2 g, Rfl: 6    cephalexin (KEFLEX) 500 MG capsule, take 1 capsule by oral route 3 times every day, Disp: 30 capsule, Rfl: 0    cetirizine (Allergy Relief Cetirizine) 10 MG tablet, Take 1 tablet by mouth Daily., Disp: 30 tablet, Rfl: 3    Cholecalciferol (VITAMIN D-3 PO), Take 1 tablet by mouth Daily., Disp: , Rfl:     Colloidal Oatmeal 1 % cream, Apply 1 Application topically 2 (Two) Times a Day., Disp: 226 g, Rfl: 3    Continuous Glucose Sensor (Dexcom G7 Sensor) misc, Use 1 each Every 10 (Ten) Days., Disp: 9 each, Rfl: 3    Dulaglutide (Trulicity) 0.75 MG/0.5ML solution pen-injector, Inject 0.75 mg under the skin into the appropriate area as directed 1 (One) Time Per Week., Disp: 6 mL, Rfl: 1    ferrous sulfate 324 (65 Fe) MG tablet delayed-release EC tablet, Take 1 tablet by mouth Daily With Breakfast., Disp: , Rfl:     furosemide (LASIX) 40 MG tablet, take 1 tablet by oral route  every day, Disp: 90 tablet, Rfl: 3    gabapentin (NEURONTIN) 300 MG capsule, Take 1 capsule by mouth 3 (Three) Times a Day., Disp: 90 capsule, Rfl: 3    glucose blood (Accu-Chek Guide) test strip, Use as instructed to test blood sugar 4 times daily, Disp: 400 each, Rfl: 1    Insulin Glargine (Lantus SoloStar) 100 UNIT/ML injection pen, Inject 32 Units under the skin into the appropriate area as directed Every Night., Disp: 15 mL, Rfl: 5    Insulin Lispro, 1 Unit Dial, (HumaLOG KwikPen) 100 UNIT/ML solution pen-injector, Inject 10 Units under the skin into the appropriate area as directed 3 (Three) Times a Day With Meals., Disp: 15 mL, Rfl: 5    Insulin Pen Needle 32G X 4 MM misc, Use 1 each 4 (Four) Times a Day Before  "Meals & at Bedtime., Disp: 400 each, Rfl: 1    Jardiance 25 MG tablet tablet, Take 1 tablet by mouth Every Morning., Disp: 90 tablet, Rfl: 1    Lancet Devices (TRUEdraw Lancing Device) misc, Use 1 each 4 (Four) Times a Day., Disp: 1 each, Rfl: 0    levothyroxine (Synthroid) 112 MCG tablet, Take 1 tablet by mouth Daily for 180 days., Disp: 90 tablet, Rfl: 1    metoprolol tartrate (LOPRESSOR) 25 MG tablet, Take 1 tablet by mouth 2 (Two) Times a Day., Disp: 60 tablet, Rfl: 3    midodrine (PROAMATINE) 2.5 MG tablet, Take 1 tablet by mouth 3 (Three) Times a Day., Disp: 90 tablet, Rfl: 3    Myrbetriq 25 MG tablet sustained-release 24 hour 24 hr tablet, Take 1 tablet by mouth Daily., Disp: 30 tablet, Rfl: 0    Omega-3 Fatty Acids (fish oil) 1000 MG capsule capsule, Take  by mouth., Disp: , Rfl:     pravastatin (Pravachol) 20 MG tablet, Take 1 tablet by mouth Every Evening., Disp: 90 tablet, Rfl: 3    Probiotic Product (PROBIOTIC BLEND PO), Take 1 tablet by mouth Daily., Disp: , Rfl:     RELION INSULIN SYR 0.5ML/31G 31G X 5/16\" 0.5 ML misc, , Disp: , Rfl:     sodium bicarbonate 650 MG tablet, Take 1 tablet by mouth 3 (Three) Times a Day., Disp: 90 tablet, Rfl: 1    TRUEplus Lancets 30G misc, Use 1 each 4 (Four) Times a Day. Before meals and at bedtime., Disp: 400 each, Rfl: 1    vitamin B-12 (CYANOCOBALAMIN) 100 MCG tablet, Take 0.5 tablets by mouth Daily., Disp: , Rfl:     vitamin C (ASCORBIC ACID) 250 MG tablet, Take 1 tablet by mouth Daily., Disp: , Rfl:     Medicines reviewed by Juaquin Yu, PharmD on 8/28/2024 at  1:03 PM    Drug Interactions  No Clinically Significant DDIs Were Identified at Present Time Upon Marking Medications Reviewed    Recommended Medications Assessment  Aspirin: Not Taking Currently - Of note, pt does take Eliquis  Statin: Currently Taking   ACEi/ARB: Not Taking Currently    Initial Education Provided for Specialty Medication  The patient has been provided with the following education and " any applicable administration techniques (i.e. self-injection) have been demonstrated for the therapies indicated. All questions and concerns have been addressed prior to the patient receiving the medication, and the patient has verbalized comprehension of the education and any materials provided. Additional patient education shall be provided and documented upon request by the patient, provider, or payer.      TRULICITY® (dulaglutide)  Medication Expectations   Why am I taking this medication? You are taking Trulicity, along with diet and exercise, to lower blood sugar because you have type 2 diabetes. Diabetes is not curable but with proper medication and treatment, you can keep your blood sugar within your personalized target range. This medication may also reduce the risk of heart attack or stroke   What should I expect while on this medication? You should expect to see your blood sugar and A1c decrease over time and you may also lose some weight.   How does the medication work? Trulicity is a non-insulin injection that works with your body's own ability to lower blood sugar and A1c and helps your body release its own insulin in response to your blood sugar rising.  This medication also slows down food from leaving your stomach, making you feel cm for longer.   How long will I be on this medication for? The amount of time you will be on this medication will be determined by your doctor based on blood sugar and A1c control. You will most likely be on this medication or another diabetes medication throughout your lifetime. Do not abruptly stop this medication without talking to your doctor first.    How do I take this medication? Take as directed on your prescription label. Trulicity is supplied in a single-use pen for each dose and you will use a new pre-filled pen each week.  It is injected under the skin (subcutaneously) of your stomach, thigh or upper arm. Use this medication once weekly, on the same day  each week, with or without food.      What are some possible side effects? In addition to decreased appetite, the most common side effects are nausea and constipation.  Redness, itching, and/or swelling can occur where the shot was given. Hypoglycemia can occur, especially if you are taking Trulicity with other medications that cause low blood sugar.    What happens if I miss a dose? If you miss a dose, take it as soon as you remember as long as there are at least 3 days until the next scheduled dose.  If there are less than 3 days, skip the missed dose and resume Trulicity on the regularly scheduled day.  Do not take 2 doses at the same time or extra doses.     Medication Safety   What are things I should warn my doctor immediately about? Do not use Trulicity if you or a family member have ever had medullary thyroid cancer (MTC) or Multiple Endocrine Neoplasia syndrome type 2 (MEN 2).  Tell your doctor if you have or have had problems with your kidneys or pancreas. Stop using Trulicity and get medical help right away if you have severe pain in your stomach area that will not go away. Talk to your doctor if you are pregnant, planning to become pregnant, or breastfeeding. Get medical help right away if you notice any signs/symptoms of an allergic reaction (rash, hives, difficulty breathing, etc.).   What are things that I should be cautious of? Be cautious of any side effects from this medication. Talk to your doctor if any new ones develop or aren't getting better.   What are some medications that can interact with this one? Taking Trulicity with other medications that also lower your blood sugar such as insulin and glipizide/glimepiride/glyburide may increase the risk of low blood sugar. Your doctor may reduce the dose of these medications when you start Trulicity. Always tell your doctor or pharmacist immediately if you start taking any new medications, including over-the-counter medications, vitamins, and herbal  supplements.      Medication Storage/Handling   How should I handle this medication? Keep this medication out of reach of pets/children and keep the pen capped   How does this medication need to be stored? Store Trulicity in the refrigerator. Do not freeze or use Trulicity that has been frozen.  You may store your Trulicity pens at room temperature for up to 14 days.     How should I dispose of this medication? Used Trulicity pens should discarded after each use (for single use only). If your doctor decides to stop this medication, take to your local police station for proper disposal. Some pharmacies also have take-back bins for medication drop-off.     Resources/Support   How can I remind myself to take this medication? You can download reminder apps to help you manage your refills, or set an alarm on your phone to remind you.    Is financial support available?  [employer] can provide co-pay cards if you have commercial insurance or patient assistance if you have Medicare or no insurance.    Which vaccines are recommended for me? Talk to your doctor about these vaccines: Flu, Coronavirus (COVID-19), Pneumococcal (pneumonia), Tdap, Hepatitis B, Zoster (shingles)           insulin glargine  Medication Expectations    Why am I taking this medication?  You are taking this medication to lower blood sugar and A1c because you have type 1 or type 2 diabetes. Diabetes is not curable but with proper medication and treatment, we can keep your blood sugar within your personalized target range.    What should I expect while on this medication?  You should expect to see your blood sugar and A1c decrease over time.    How does the medication work?  Insulin glargine (Basaglar, Lantus, Semglee, etc.) is a long-acting insulin that releases slowly and continuously in the body. Insulin helps the sugar in your blood get into cells and provide them with energy to fuel your body.     How long will I be on this medication for?  If you have  Type 1 diabetes, you will be on this medication or another insulin throughout your lifetime because your body cannot make its own insulin. If you have Type 2 diabetes, the amount of time you will be on this medication will be determined by your doctor based on blood sugar and A1c control. You will most likely be on this medication or another diabetes medication throughout your lifetime because your body does not make enough insulin. Do not abruptly stop this medication without talking to your doctor first.     How do I take this medication?  Take as directed on your prescription label. Insulin glargine is usually given once or twice daily and can be taken with or without food. Insulin glargine  is injected under the skin (subcutaneously) of your stomach, thigh, buttocks, or upper arm. Use a different injection site in the same body region each day.     What are some possible side effects?  Insulin glargine  may cause low blood sugar (hypoglycemia). Signs and symptoms that may indicate hypoglycemia include dizziness, sweating, anxiety, irritability, confusion, or headache.    What happens if I miss a dose?  If you miss a dose, take it as soon as you remember. If it is close to your next dose, skip it. Never take 2 doses at once.       Medication Safety    What are things I should warn my doctor immediately about?  Tell your doctor if you have kidney or liver problems. Talk to your doctor if you are pregnant, planning to become pregnant, or breastfeeding. Also tell your doctor if you notice any signs/symptoms of an allergic reaction (rash, hives, difficulty breathing, etc.).    What are things that I should be cautious of?  Be cautious of any side effects from this medication. Talk to your doctor if any new ones develop or aren't getting better.    What are some medications that can interact with this one?  Do not take this medication with rosiglitazone or macimorelin. Taking insulin glargine with other medications  that lower your blood sugar may increase the risk of hypoglycemia. Your doctor may reduce the dose of these medications when you start insulin glargine  Always tell your doctor or pharmacist immediately if you start taking any new medications, including over-the-counter medications, vitamins, and herbal supplements.        Medication Storage/Handling    How should I handle this medication?  Keep this medication out of reach of pets/children and keep the pen capped when not in use. Do not share your medicine with others.     How does this medication need to be stored?  Store your new, unused pens in the original carton in the refrigerator. Protect it from light. Do not freeze. Always remove the needle and place the cap on the pen before storing.     How should I dispose of this medication?  Used insulin glargine  pens should be thrown away after 28 days even if insulin remains.?Place your used pen and needle in an approved sharps container?If your doctor decides to stop this medication, take to your local police station for proper disposal. Some pharmacies also have?take-back bins for medication drop-off.??       Resources/Support    How can I remind myself to take this medication?  You can download reminder apps to help you manage your refills or set an alarm on your phone to remind you.     Is financial support available?   Manufacturers of insulin glargine  can provide co-pay cards if you have commercial insurance or patient assistance if you have Medicare or no insurance.     Which vaccines are recommended for me?  Talk to your doctor about these vaccines: Flu, Coronavirus (COVID-19), Pneumococcal (pneumonia), Tdap, Hepatitis B, Zoster (shingles)            Adherence, Self-Administration, and Current Therapy Problems  Adherence related to the patient's specialty therapy was discussed with the patient. The Adherence segment of this outreach has been reviewed and updated.     Is there a concern with patient's  ability to self administer the medication correctly and without issue?: No  Were any potential barriers to adherence identified during the initial assessment or patient education?: No  Are there any concerns regarding the patient's understanding of the importance of medication adherence?: No    Adherence Questions  Linked Medication(s) Assessed: Empagliflozin  On average, how many doses/injections does the patient miss per month?: 0  What are the identified reasons for non-adherence or missed doses? : no problems identified  What is the estimated medication adherence level?: %  Based on the patient/caregiver response and refill history, does this patient require an MTP to track adherence improvements?: no    Additional Barriers to Patient Self-Administration: No known barriers at this time  Methods for Supporting Patient Self-Administration: Continued  enrollment    Open Medication Therapy Problems  No medication therapy recommendations to display    Adverse Drug Reactions  Medication tolerability: Tolerating with no to minimal ADRs  Medication plan: Continue therapy with normal follow-up  Plan for ADR Management: N/A at this time.  Pt reports they are tolerating therapy well    Goals of Therapy  Goals related to the patient's specialty therapy were discussed with the patient. The Patient Goals segment of this outreach has been reviewed and updated.   Goals Addressed Today        Specialty Pharmacy General Goal      Decrease A1c to <7%      Lab Results   Component Value Date    HGBA1C 7.4 (H) 08/03/2024    HGBA1C 7.0 (H) 07/03/2024    HGBA1C 7.4 (H) 05/09/2024    HGBA1C 7.9 (H) 03/27/2024    HGBA1C 8.1 (H) 12/28/2023                    Quality of Life Assessment   Quality of Life related to the patient's enrollment in the patient management program and services provided was discussed with the patient. The QOL segment of this outreach has been reviewed and updated.    Quality of Life Improvement Scale:  8-Moderately better    Reassessment Plan & Follow-Up  1. Medication Therapy Changes: Med Addition Assessment - Continuation of current Insulin Glargine and Trulicity therapies, but will now be managed officially through [facility].  Will continue to follow.  2. Related Plans, Therapy Recommendations, or Therapy Problems to Be Addressed: None  3. Pharmacist to perform regular assessments no more than (6) months from the previous assessment.  4. Care Coordinator to set up future refill outreaches, coordinate prescription delivery, and escalate clinical questions to pharmacist.    Attestation  Therapeutic appropriateness: Appropriate   I attest the patient was actively involved in and has agreed to the above plan of care. If the prescribed therapy is at any point deemed not appropriate based on the current or future assessments, a consultation will be initiated with the patient's specialty care provider to determine the best course of action. The revised plan of therapy will be documented along with any required assessments and/or additional patient education provided.       Juaquin Yu, PharmD, MPH  Clinical Specialty Pharmacist, Endocrinology  8/28/2024  13:09 EDT

## 2024-08-29 ENCOUNTER — OFFICE VISIT (OUTPATIENT)
Dept: FAMILY MEDICINE CLINIC | Facility: CLINIC | Age: 59
End: 2024-08-29
Payer: MEDICAID

## 2024-08-29 VITALS
HEIGHT: 61 IN | WEIGHT: 278.8 LBS | TEMPERATURE: 98.6 F | HEART RATE: 105 BPM | BODY MASS INDEX: 52.64 KG/M2 | DIASTOLIC BLOOD PRESSURE: 86 MMHG | SYSTOLIC BLOOD PRESSURE: 121 MMHG | OXYGEN SATURATION: 92 %

## 2024-08-29 DIAGNOSIS — Z78.0 POSTMENOPAUSE: ICD-10-CM

## 2024-08-29 DIAGNOSIS — Z79.4 TYPE 2 DIABETES MELLITUS WITHOUT COMPLICATION, WITH LONG-TERM CURRENT USE OF INSULIN: Primary | ICD-10-CM

## 2024-08-29 DIAGNOSIS — J44.9 CHRONIC OBSTRUCTIVE PULMONARY DISEASE, UNSPECIFIED COPD TYPE: ICD-10-CM

## 2024-08-29 DIAGNOSIS — E11.9 TYPE 2 DIABETES MELLITUS WITHOUT COMPLICATION, WITH LONG-TERM CURRENT USE OF INSULIN: Primary | ICD-10-CM

## 2024-08-29 DIAGNOSIS — R35.0 URINARY FREQUENCY: ICD-10-CM

## 2024-08-29 DIAGNOSIS — Z12.31 ENCOUNTER FOR SCREENING MAMMOGRAM FOR MALIGNANT NEOPLASM OF BREAST: ICD-10-CM

## 2024-08-29 LAB
BILIRUB BLD-MCNC: NEGATIVE MG/DL
CLARITY, POC: ABNORMAL
COLOR UR: YELLOW
EXPIRATION DATE: ABNORMAL
GLUCOSE UR STRIP-MCNC: NEGATIVE MG/DL
KETONES UR QL: NEGATIVE
LEUKOCYTE EST, POC: ABNORMAL
Lab: ABNORMAL
NITRITE UR-MCNC: NEGATIVE MG/ML
PH UR: 6 [PH] (ref 5–8)
PROT UR STRIP-MCNC: NEGATIVE MG/DL
RBC # UR STRIP: NEGATIVE /UL
SP GR UR: 1.01 (ref 1–1.03)
UROBILINOGEN UR QL: ABNORMAL

## 2024-08-29 PROCEDURE — 1126F AMNT PAIN NOTED NONE PRSNT: CPT | Performed by: NURSE PRACTITIONER

## 2024-08-29 PROCEDURE — 99214 OFFICE O/P EST MOD 30 MIN: CPT | Performed by: NURSE PRACTITIONER

## 2024-08-29 PROCEDURE — 87086 URINE CULTURE/COLONY COUNT: CPT | Performed by: NURSE PRACTITIONER

## 2024-08-29 PROCEDURE — 87186 SC STD MICRODIL/AGAR DIL: CPT | Performed by: NURSE PRACTITIONER

## 2024-08-29 PROCEDURE — 87077 CULTURE AEROBIC IDENTIFY: CPT | Performed by: NURSE PRACTITIONER

## 2024-08-29 PROCEDURE — 3051F HG A1C>EQUAL 7.0%<8.0%: CPT | Performed by: NURSE PRACTITIONER

## 2024-08-29 RX ORDER — SODIUM BICARBONATE 650 MG/1
650 TABLET ORAL 3 TIMES DAILY
Qty: 90 TABLET | Refills: 1 | Status: SHIPPED | OUTPATIENT
Start: 2024-08-29

## 2024-08-29 RX ORDER — BUDESONIDE AND FORMOTEROL FUMARATE DIHYDRATE 160; 4.5 UG/1; UG/1
2 AEROSOL RESPIRATORY (INHALATION)
Qty: 10.2 G | Refills: 6 | Status: SHIPPED | OUTPATIENT
Start: 2024-08-29

## 2024-08-29 NOTE — PROGRESS NOTES
Subjective   Marilyn Martins is a 59 y.o. female presents for   Chief Complaint   Patient presents with    Diabetes     3 month follow up    Hypothyroidism    COPD       Health Maintenance Due   Topic Date Due    DXA SCAN  Never done    ANNUAL PHYSICAL  Never done    COVID-19 Vaccine (3 - 2023-24 season) 09/01/2023    COLORECTAL CANCER SCREENING  06/28/2024    DIABETIC FOOT EXAM  08/15/2024    INFLUENZA VACCINE  08/01/2024    MAMMOGRAM  10/19/2024    DIABETIC EYE EXAM  10/23/2024       History of Present Illness  The patient is a 59-year-old female who presents for a 3-month follow-up of diabetes, hypothyroidism, and COPD. She is accompanied by an adult female.    She reports that her blood sugar levels have been elevated recently. She had to stop using Trulicity due to insurance coverage issues and informed Dr. Narvaez about this. She was prescribed Ozempic as an alternative, but it caused her to feel unwell. Dr. Narvaez suggested trying Trelegy at a low dose, which helped lower her blood sugar levels. However, she was unable to continue with Trelegy due to insurance coverage issues.    She mentions that her Symbicort inhaler prescription was not filled by the pharmacy. She also has difficulty obtaining her sodium bicarbonate medication.    She experiences pain in her right knee, which hampers her mobility.    She has an upcoming appointment with an ophthalmologist in October 2024. She has not undergone a mammogram or DEXA scan recently.    She is scheduled for a CT scan and ultrasound of her left kidney this afternoon to investigate potential kidney stones. She requests a test for a urinary tract infection (UTI) and mentions that she cannot take Keflex as it causes stomach upset and loss of appetite. She has previously tested positive for E. coli in her urine culture. She has no current symptoms of UTI but notes that she can have UTIs without noticeable symptoms.    She has been experiencing issues with her insurance  "recently.    She reports no ear-related problems.    Vitals:    08/29/24 0858   BP: 121/86   BP Location: Right arm   Patient Position: Sitting   Cuff Size: Large Adult   Pulse: 105   Temp: 98.6 °F (37 °C)   TempSrc: Tympanic   SpO2: 92%   Weight: 126 kg (278 lb 12.8 oz)   Height: 154.9 cm (60.98\")     Body mass index is 52.71 kg/m².    Current Outpatient Medications on File Prior to Visit   Medication Sig Dispense Refill    albuterol sulfate  (90 Base) MCG/ACT inhaler Inhale 2 puffs every 6 hours as needed for wheezing and shortness of breath 18 g 1    apixaban (ELIQUIS) 5 MG tablet tablet Take 1 tablet by mouth Every 12 (Twelve) Hours. 60 tablet 3    Blood Glucose Monitoring Suppl (Accu-Chek Guide) w/Device kit 1 each 4 (Four) Times a Day After Meals & at Bedtime. 1 kit 0    cetirizine (Allergy Relief Cetirizine) 10 MG tablet Take 1 tablet by mouth Daily. 30 tablet 3    Continuous Glucose Sensor (Dexcom G7 Sensor) misc Use 1 each Every 10 (Ten) Days. 9 each 3    ferrous sulfate 324 (65 Fe) MG tablet delayed-release EC tablet Take 1 tablet by mouth Daily With Breakfast.      furosemide (LASIX) 40 MG tablet take 1 tablet by oral route  every day 90 tablet 3    gabapentin (NEURONTIN) 300 MG capsule Take 1 capsule by mouth 3 (Three) Times a Day. 90 capsule 3    glucose blood (Accu-Chek Guide) test strip Use as instructed to test blood sugar 4 times daily 400 each 1    Insulin Glargine (Lantus SoloStar) 100 UNIT/ML injection pen Inject 32 Units under the skin into the appropriate area as directed Every Night. 15 mL 5    Insulin Lispro, 1 Unit Dial, (HumaLOG KwikPen) 100 UNIT/ML solution pen-injector Inject 10 Units under the skin into the appropriate area as directed 3 (Three) Times a Day With Meals. 15 mL 5    Insulin Pen Needle 32G X 4 MM misc Use 1 each 4 (Four) Times a Day Before Meals & at Bedtime. 400 each 1    Jardiance 25 MG tablet tablet Take 1 tablet by mouth Every Morning. 90 tablet 1    Lancet " "Devices (TRUEdraw Lancing Device) misc Use 1 each 4 (Four) Times a Day. 1 each 0    levothyroxine (Synthroid) 112 MCG tablet Take 1 tablet by mouth Daily for 180 days. 90 tablet 1    metoprolol tartrate (LOPRESSOR) 25 MG tablet Take 1 tablet by mouth 2 (Two) Times a Day. 60 tablet 3    midodrine (PROAMATINE) 2.5 MG tablet Take 1 tablet by mouth 3 (Three) Times a Day. 90 tablet 3    Myrbetriq 25 MG tablet sustained-release 24 hour 24 hr tablet Take 1 tablet by mouth Daily. 30 tablet 0    pravastatin (Pravachol) 20 MG tablet Take 1 tablet by mouth Every Evening. 90 tablet 3    RELION INSULIN SYR 0.5ML/31G 31G X 5/16\" 0.5 ML misc       TRUEplus Lancets 30G misc Use 1 each 4 (Four) Times a Day. Before meals and at bedtime. 400 each 1    Colloidal Oatmeal 1 % cream Apply 1 Application topically 2 (Two) Times a Day. (Patient not taking: Reported on 8/29/2024) 226 g 3    [DISCONTINUED] budesonide-formoterol (SYMBICORT) 160-4.5 MCG/ACT inhaler Inhale 2 puffs 2 (Two) Times a Day. (Patient not taking: Reported on 8/29/2024) 10.2 g 6    [DISCONTINUED] cephalexin (KEFLEX) 500 MG capsule take 1 capsule by oral route 3 times every day (Patient not taking: Reported on 8/29/2024) 30 capsule 0    [DISCONTINUED] Cholecalciferol (VITAMIN D-3 PO) Take 1 tablet by mouth Daily. (Patient not taking: Reported on 8/29/2024)      [DISCONTINUED] Dulaglutide (Trulicity) 0.75 MG/0.5ML solution pen-injector Inject 0.75 mg under the skin into the appropriate area as directed 1 (One) Time Per Week. (Patient not taking: Reported on 8/29/2024) 6 mL 1    [DISCONTINUED] Omega-3 Fatty Acids (fish oil) 1000 MG capsule capsule Take  by mouth. (Patient not taking: Reported on 8/29/2024)      [DISCONTINUED] Probiotic Product (PROBIOTIC BLEND PO) Take 1 tablet by mouth Daily.      [DISCONTINUED] SITagliptin (Januvia) 50 MG tablet Take 1 tablet by mouth Daily. 90 tablet 3    [DISCONTINUED] sodium bicarbonate 650 MG tablet Take 1 tablet by mouth 3 (Three) " Times a Day. (Patient not taking: Reported on 8/29/2024) 90 tablet 1    [DISCONTINUED] vitamin B-12 (CYANOCOBALAMIN) 100 MCG tablet Take 0.5 tablets by mouth Daily.      [DISCONTINUED] vitamin C (ASCORBIC ACID) 250 MG tablet Take 1 tablet by mouth Daily.       No current facility-administered medications on file prior to visit.       The following portions of the patient's history were reviewed and updated as appropriate: allergies, current medications, past family history, past medical history, past social history, past surgical history, and problem list.    Review of Systems    Objective   Physical Exam  Vitals and nursing note reviewed.   Constitutional:       Appearance: Normal appearance. She is well-developed. She is obese.   HENT:      Head: Normocephalic and atraumatic.      Right Ear: External ear normal.      Left Ear: External ear normal.      Nose: Nose normal.   Eyes:      Extraocular Movements: Extraocular movements intact.      Pupils: Pupils are equal, round, and reactive to light.   Cardiovascular:      Rate and Rhythm: Normal rate and regular rhythm.      Heart sounds: Normal heart sounds.   Pulmonary:      Effort: Pulmonary effort is normal.      Breath sounds: Normal breath sounds.   Abdominal:      General: Bowel sounds are normal.      Palpations: Abdomen is soft.   Genitourinary:     Vagina: Normal.   Musculoskeletal:         General: Normal range of motion.      Cervical back: Normal range of motion and neck supple.      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   Skin:     General: Skin is warm and dry.   Neurological:      General: No focal deficit present.      Mental Status: She is alert and oriented to person, place, and time.   Psychiatric:         Mood and Affect: Mood normal.         Behavior: Behavior normal.         Judgment: Judgment normal.              PHQ-9 Total Score:      Results  Laboratory Studies  A1c was slightly elevated.    Assessment & Plan   Diagnoses  and all orders for this visit:    1. Type 2 diabetes mellitus without complication, with long-term current use of insulin (Primary)    2. Postmenopause  -     DEXA Bone Density Axial; Future    3. Encounter for screening mammogram for malignant neoplasm of breast  -     Mammo Screening Digital Tomosynthesis Bilateral With CAD; Future    4. Chronic obstructive pulmonary disease, unspecified COPD type  -     budesonide-formoterol (SYMBICORT) 160-4.5 MCG/ACT inhaler; Inhale 2 puffs 2 (Two) Times a Day.  Dispense: 10.2 g; Refill: 6    5. Urinary frequency  -     POCT urinalysis dipstick, automated    Other orders  -     sodium bicarbonate 650 MG tablet; Take 1 tablet by mouth 3 (Three) Times a Day.  Dispense: 90 tablet; Refill: 1         1. Diabetes Mellitus.  Her A1c was elevated, which aligns with her report of high blood sugar levels. She has been unable to use Trulicity due to insurance issues and has been prescribed Ozempic, which she cannot tolerate. She is advised to contact Dr. Narvaez's office to address the insurance issue for Trulicity. The prescription for sodium bicarbonate has been renewed, as discontinuation might be contributing to her elevated blood sugar levels.    2. Hypothyroidism.  No specific issues. She should continue her current treatment regimen.    3. Chronic Obstructive Pulmonary Disease (COPD).  The prescription for Symbicort inhaler has been renewed.    4. Health Maintenance.  A mammogram and DEXA scan have been ordered. She is due for an eye exam in October.    5. Suspected Urinary Tract Infection (UTI).  A urine specimen will be collected today for testing. If initial results are inconclusive, it will be sent for culture. She cannot tolerate Keflex due to gastrointestinal side effects.    Follow-up  A follow-up visit is scheduled in 6 months.    There are no Patient Instructions on file for this visit.         Patient or patient representative verbalized consent for the use of Ambient  Listening during the visit with  MARRY Simpson for chart documentation. 8/29/2024  10:14 EDT  Answers submitted by the patient for this visit:  Primary Reason for Visit (Submitted on 8/27/2024)  What is the primary reason for your visit?: Diabetes  Diabetes Questionnaire (Submitted on 8/27/2024)  Chief Complaint: Diabetes problem  Diabetes type: type 2  MedicAlert ID: No  Treatment compliance: some of the time  Symptom course: worsening  Home blood tests: 3-4 x per day  High score: >200  Below 70: never  foot paresthesias: No  foot ulcerations: No  blackouts: No  hospitalization: No  nocturnal hypoglycemia: No  required assistance: Yes  required glucagon: No  headaches: No  sweats: No  Dose schedule: pre-breakfast, pre-lunch, pre-dinner, at bedtime  Given by: adult caretaker  Injection sites: arms  Current diet: generally unhealthy, high fat/cholesterol, low fiber  Exercise: never  Eye exam current: Yes  Sees podiatrist: No

## 2024-08-31 LAB — BACTERIA SPEC AEROBE CULT: ABNORMAL

## 2024-09-03 DIAGNOSIS — N30.00 ACUTE CYSTITIS WITHOUT HEMATURIA: Primary | ICD-10-CM

## 2024-09-03 DIAGNOSIS — R35.1 FREQUENT URINATION AT NIGHT: ICD-10-CM

## 2024-09-03 RX ORDER — CETIRIZINE HYDROCHLORIDE 10 MG/1
10 TABLET ORAL DAILY
Qty: 30 TABLET | Refills: 3 | Status: SHIPPED | OUTPATIENT
Start: 2024-09-03

## 2024-09-03 RX ORDER — NITROFURANTOIN 25; 75 MG/1; MG/1
100 CAPSULE ORAL 2 TIMES DAILY
Qty: 10 CAPSULE | Refills: 0 | Status: SHIPPED | OUTPATIENT
Start: 2024-09-03

## 2024-09-03 RX ORDER — MIRABEGRON 25 MG/1
25 TABLET, FILM COATED, EXTENDED RELEASE ORAL DAILY
Qty: 30 TABLET | Refills: 0 | Status: SHIPPED | OUTPATIENT
Start: 2024-09-03

## 2024-09-04 ENCOUNTER — SPECIALTY PHARMACY (OUTPATIENT)
Dept: ENDOCRINOLOGY | Facility: CLINIC | Age: 59
End: 2024-09-04
Payer: MEDICAID

## 2024-09-04 NOTE — PROGRESS NOTES
"Specialty Pharmacy Refill Coordination Note     Marilyn \"Delphine\" is a 59 y.o. female contacted today regarding refills of her specialty medication(s).    Specialty medication(s) and dose(s) confirmed: yes  Changes to medications: no  Changes to insurance: no  Reviewed and verified with patient:         Refill Questions      Flowsheet Row Most Recent Value   Changes to allergies? No   Changes to medications? No   New conditions or infections since last clinic visit No   Unplanned office visit, urgent care, ED, or hospital admission in the last 4 weeks  No   How does patient/caregiver feel medication is working? Good   Financial problems or insurance changes  No   Since the previous refill, were any specialty medication doses or scheduled injections missed or delayed?  No   Does this patient require a clinical escalation to a pharmacist? No            Delivery Questions      Flowsheet Row Most Recent Value   Delivery method  at Pharmacy   Number of medications in delivery 10   Medication(s) being filled and delivered Glucose Blood, Insulin Pen Needle, Continuous Glucose Sensor, Apixaban, Empagliflozin, Metoprolol Tartrate, Midodrine Hcl, Nitrofurantoin Monohyd Macro, Sodium Bicarbonate (Antacids - Bicarbonate), Lancets   Copay verified? Yes   Copay amount 0   Copay form of payment No copayment ($0)   Signature Required No                 Follow-up: 1 month(s)     Shirley Arellano, Pharmacy Technician  Specialty Pharmacy Technician   9/4/2024   08:34 EDT      "

## 2024-09-06 ENCOUNTER — HOSPITAL ENCOUNTER (OUTPATIENT)
Dept: MAMMOGRAPHY | Facility: HOSPITAL | Age: 59
Discharge: HOME | End: 2024-09-06
Admitting: NURSE PRACTITIONER
Payer: MEDICAID

## 2024-09-06 DIAGNOSIS — Z12.31 ENCOUNTER FOR SCREENING MAMMOGRAM FOR MALIGNANT NEOPLASM OF BREAST: ICD-10-CM

## 2024-09-06 PROCEDURE — 77067 SCR MAMMO BI INCL CAD: CPT

## 2024-09-06 PROCEDURE — 77063 BREAST TOMOSYNTHESIS BI: CPT

## 2024-09-16 ENCOUNTER — HOSPITAL ENCOUNTER (OUTPATIENT)
Dept: BONE DENSITY | Facility: HOSPITAL | Age: 59
Discharge: HOME | End: 2024-09-16
Admitting: NURSE PRACTITIONER
Payer: MEDICAID

## 2024-09-16 DIAGNOSIS — Z78.0 POSTMENOPAUSE: ICD-10-CM

## 2024-09-16 PROCEDURE — 77080 DXA BONE DENSITY AXIAL: CPT

## 2024-09-24 RX ORDER — MIDODRINE HYDROCHLORIDE 2.5 MG/1
2.5 TABLET ORAL 3 TIMES DAILY
Qty: 90 TABLET | Refills: 3 | Status: SHIPPED | OUTPATIENT
Start: 2024-09-24

## 2024-09-24 RX ORDER — METOPROLOL TARTRATE 25 MG/1
25 TABLET, FILM COATED ORAL 2 TIMES DAILY
Qty: 60 TABLET | Refills: 3 | Status: SHIPPED | OUTPATIENT
Start: 2024-09-24

## 2024-09-26 ENCOUNTER — TELEPHONE (OUTPATIENT)
Dept: ENDOCRINOLOGY | Facility: CLINIC | Age: 59
End: 2024-09-26
Payer: MEDICAID

## 2024-09-26 RX ORDER — LEVOTHYROXINE SODIUM 112 UG/1
112 TABLET ORAL DAILY
Qty: 90 TABLET | Refills: 1 | Status: SHIPPED | OUTPATIENT
Start: 2024-09-26 | End: 2024-09-30

## 2024-09-28 LAB
ALBUMIN SERPL-MCNC: 3.6 G/DL (ref 3.9–4.9)
ALP SERPL-CCNC: 124 IU/L (ref 44–121)
ALT SERPL-CCNC: 15 IU/L (ref 0–32)
AMBIG ABBREV CMP14 DEFAULT: NORMAL
AST SERPL-CCNC: 16 IU/L (ref 0–40)
BILIRUB SERPL-MCNC: 0.3 MG/DL (ref 0–1.2)
BUN SERPL-MCNC: 29 MG/DL (ref 8–27)
BUN/CREAT SERPL: 20 (ref 12–28)
CALCIUM SERPL-MCNC: 8.7 MG/DL (ref 8.7–10.3)
CHLORIDE SERPL-SCNC: 99 MMOL/L (ref 96–106)
CO2 SERPL-SCNC: 23 MMOL/L (ref 20–29)
CREAT SERPL-MCNC: 1.48 MG/DL (ref 0.57–1)
EGFRCR SERPLBLD CKD-EPI 2021: 40 ML/MIN/1.73
GLOBULIN SER CALC-MCNC: 3.9 G/DL (ref 1.5–4.5)
GLUCOSE SERPL-MCNC: 165 MG/DL (ref 70–99)
POTASSIUM SERPL-SCNC: 3.8 MMOL/L (ref 3.5–5.2)
PROT SERPL-MCNC: 7.5 G/DL (ref 6–8.5)
SODIUM SERPL-SCNC: 138 MMOL/L (ref 134–144)
T4 FREE SERPL-MCNC: 1.26 NG/DL (ref 0.82–1.77)
TSH SERPL DL<=0.005 MIU/L-ACNC: 6.95 UIU/ML (ref 0.45–4.5)

## 2024-09-30 ENCOUNTER — OFFICE VISIT (OUTPATIENT)
Dept: ENDOCRINOLOGY | Facility: CLINIC | Age: 59
End: 2024-09-30
Payer: MEDICAID

## 2024-09-30 VITALS
HEART RATE: 76 BPM | DIASTOLIC BLOOD PRESSURE: 58 MMHG | BODY MASS INDEX: 52.49 KG/M2 | OXYGEN SATURATION: 97 % | HEIGHT: 61 IN | WEIGHT: 278 LBS | SYSTOLIC BLOOD PRESSURE: 114 MMHG

## 2024-09-30 DIAGNOSIS — E11.42 DIABETIC PERIPHERAL NEUROPATHY: ICD-10-CM

## 2024-09-30 DIAGNOSIS — Z79.4 TYPE 2 DIABETES MELLITUS WITH HYPERGLYCEMIA, WITH LONG-TERM CURRENT USE OF INSULIN: Primary | ICD-10-CM

## 2024-09-30 DIAGNOSIS — E11.65 TYPE 2 DIABETES MELLITUS WITH HYPERGLYCEMIA, WITH LONG-TERM CURRENT USE OF INSULIN: Primary | ICD-10-CM

## 2024-09-30 DIAGNOSIS — E78.49 OTHER HYPERLIPIDEMIA: ICD-10-CM

## 2024-09-30 DIAGNOSIS — E03.9 HYPOTHYROIDISM, UNSPECIFIED TYPE: ICD-10-CM

## 2024-09-30 DIAGNOSIS — N18.32 STAGE 3B CHRONIC KIDNEY DISEASE: ICD-10-CM

## 2024-09-30 DIAGNOSIS — E66.01 MORBID OBESITY: ICD-10-CM

## 2024-09-30 RX ORDER — INSULIN LISPRO 100 [IU]/ML
21 INJECTION, SOLUTION INTRAVENOUS; SUBCUTANEOUS
Qty: 15 ML | Refills: 5 | Status: SHIPPED | OUTPATIENT
Start: 2024-09-30

## 2024-09-30 RX ORDER — EMPAGLIFLOZIN 25 MG/1
25 TABLET, FILM COATED ORAL EVERY MORNING
Qty: 90 TABLET | Refills: 1 | Status: SHIPPED | OUTPATIENT
Start: 2024-09-30

## 2024-09-30 RX ORDER — INSULIN GLARGINE 100 [IU]/ML
40 INJECTION, SOLUTION SUBCUTANEOUS NIGHTLY
Qty: 30 ML | Refills: 3 | Status: SHIPPED | OUTPATIENT
Start: 2024-09-30

## 2024-09-30 RX ORDER — LEVOTHYROXINE SODIUM 125 UG/1
125 TABLET ORAL DAILY
Qty: 90 TABLET | Refills: 1 | Status: SHIPPED | OUTPATIENT
Start: 2024-09-30 | End: 2025-03-29

## 2024-09-30 NOTE — PATIENT INSTRUCTIONS
Please,     - Continue Jardiance 25 mgs once a day    - Increase Lantus to 35 units once every night.  (If your fasting blood glucose is still more than 150 then increase to 40 units)    - Use mealtime insulin 15 units with each meal with correction scale    Mealtime insulin needs to be taking 15 minutes before each meal.  Please check your blood sugar before giving mealtime insulin.    - Start Trulicity 0.75 mgs once a week    - Continue to check your blood sugar through Dexcom G7.     If your blood sugar is running less than 100 and fasting then decrease your nighttime insulin by 3 units.    If your blood sugar before lunch, before dinner and before bedtime snack is less than 100 then decrease your mealtime insulin by 2 units.    Use correction scale if needed:    Corrections for High Blood Sugar  Use the following guide to “correct” for pre-meal high blood sugar.  This insulin will help “correct” the high reading.  You will still need to take as per scheduled meal insulin.    If your   Blood Sugar Reading is…. Number of additional   units of Insulin Lispro to Take…  (Correction)    Take 0 additional unit   151-200 Take 1 additional unit   201-250 Take 2 additional unit   251-300 Take 3 additional unit   301-350 Take 4 additional unit   351-400 Take 5 additional unit   More than 400 Take 6 additional unit       Continue pravastatin therapy to 20 mg 1 pill by mouth daily.     For levothyroxine increase to 125 mcg 1 pill by mouth early in the morning at fasting state and maintain fasting for at least 40 minutes.     Repeat blood work and clinical visit in 3 months.    Thank you for your visit today.     If you have any questions or concerns please feel free to reach out of the office.

## 2024-09-30 NOTE — PROGRESS NOTES
-----------------------------------------------------------------  ENDOCRINE CLINIC NOTE  -----------------------------------------------------------------        PATIENT NAME: Marilyn Martins  PATIENT : 1965 AGE: 59 y.o.  MRN NUMBER: [id number]  PRIMARY CARE: Sondra Thomas APRN    ==========================================================================    CHIEF COMPLAINT: Type 2 Diabetes Mellitus and Hypothyrodism  DATE OF SERVICE: 24    ==========================================================================    HPI / SUBJECTIVE    59 y.o. female is seen in the clinic today for follow-up of type 2 Diabetes and Hypothyroidism.    Type 2 Diabetes Mellitus:  Diagnosed with type 2 diabetes around   Current therapy:  Jardiance 25 mg p.o. daily  Insulin Basaglar 32 units nightly  Insulin lispro 17 units with each meal  Trulicity 0.75 mgs once a week - not taken Trulicity for last 2 months (reports that once she was able to take Trulicity there was a better control of blood sugar but due to some insurance issue patient is not able to receive the Trulicity yet and there was some miscommunication between the pharmacy and insurance company)  Medications not taking:  Ozempic due to abdominal pain and unable to eat for few days  Januvia discontinued by Tadeo due to heart failure  Underlying history of CKD status post right nephrectomy in 2002  Currently checking blood sugar through G7 Dexcom.  Following in ophthalmology in the clinic, following at [facility]. Last visit was 2023 as per pt.  Reports to have mild neuropathy in legs.  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     Hypothyroidism:  Diagnosed with hypothyroidism in .  Current dose is 112 mcg daily.    ==========================================================================                                                PAST MEDICAL  HISTORY    Past Medical History:   Diagnosis Date    A-fib     Atrial fibrillation with rapid ventricular rate    Allergic     Anemia     Anxiety     Arthritis     Asthma     Callus     Cataract     CHF (congestive heart failure)     Cholelithiasis 1987    Gall bladder removed    Chronic constipation     Chronic diarrhea     COPD (chronic obstructive pulmonary disease) 2003    Depression     Diabetes mellitus     Diabetes mellitus     Difficulty walking     Unable to walk due to left leg amputated    Diverticulosis 2022    GERD (gastroesophageal reflux disease) 2005    Gestational diabetes     1&3 child    Hashimoto's thyroiditis     Headache     Heart murmur 2007    Sometimes heard    History of medical problems April 8,2024    A-fib with rvr    History of transfusion     Last done on Dec 8th 2022 for right kidney removel    HL (hearing loss) 2012    Left ear drum is busted, hole in right ear, get excess of build up in both ears    Hyperlipidemia 2000    Hypothyroidism 1986    Uncontroled    Kidney stone 2022    Left side two removed by lazer surgery,right kidney removed Dec.8th of this year.    Low back pain 2001    Neuropathy in diabetes     Obesity 1989    Osteoporosis     Pneumonia 2022 March of this year    Renal insufficiency 2016    Shortness of breath     Sinusitis     Substance abuse     Type 2 diabetes mellitus     Urinary tract infection     Visual impairment 1985       ==========================================================================    PAST SURGICAL HISTORY    Past Surgical History:   Procedure Laterality Date    ABOVE KNEE AMPUTATION Left 02/2000    ANKLE OPEN REDUCTION INTERNAL FIXATION      Before on left ankle    CHOLECYSTECTOMY  2002    COLONOSCOPY  2004    EYE SURGERY  2014    Carrects    FOOT SURGERY      Had broken left ankle before amputation    HERNIA REPAIR      HYSTERECTOMY      JOINT REPLACEMENT      SUBTOTAL HYSTERECTOMY      TOENAIL EXCISION      Right big toe nail     URETERAL STENT INSERTION         ==========================================================================    FAMILY HISTORY    Family History   Problem Relation Age of Onset    Anxiety disorder Daughter     Developmental Disability Daughter     Diabetes Daughter         Pre diabetic with first child    Learning disabilities Daughter     Mental illness Daughter         Cutter, anything to harm herself, hanging, burning etc.    Thyroid disease Daughter         Swollen glands, pregnant with second child    Anxiety disorder Daughter     Developmental Disability Daughter     Diabetes Daughter     Learning disabilities Daughter     Mental illness Daughter         Cutters    Developmental Disability Son     Learning disabilities Son     Diabetes Mother        ==========================================================================    SOCIAL HISTORY    Social History     Socioeconomic History    Marital status:     Number of children: 3    Years of education: 12   Tobacco Use    Smoking status: Never    Smokeless tobacco: Never   Vaping Use    Vaping status: Never Used   Substance and Sexual Activity    Alcohol use: Yes     Comment: Wine coolers and sleeping pills together 1996    Drug use: Never    Sexual activity: Not Currently     Partners: Male     Birth control/protection: Hysterectomy       ==========================================================================    MEDICATIONS      Current Outpatient Medications:     albuterol sulfate  (90 Base) MCG/ACT inhaler, Inhale 2 puffs every 6 hours as needed for wheezing and shortness of breath, Disp: 18 g, Rfl: 1    apixaban (ELIQUIS) 5 MG tablet tablet, Take 1 tablet by mouth Every 12 (Twelve) Hours., Disp: 60 tablet, Rfl: 3    Blood Glucose Monitoring Suppl (Accu-Chek Guide) w/Device kit, 1 each 4 (Four) Times a Day After Meals & at Bedtime., Disp: 1 kit, Rfl: 0    budesonide-formoterol (SYMBICORT) 160-4.5 MCG/ACT inhaler, Inhale 2 puffs 2 (Two) Times  a Day., Disp: 10.2 g, Rfl: 6    cetirizine (Allergy Relief Cetirizine) 10 MG tablet, Take 1 tablet by mouth Daily., Disp: 30 tablet, Rfl: 3    Colloidal Oatmeal 1 % cream, Apply 1 Application topically 2 (Two) Times a Day., Disp: 226 g, Rfl: 3    Continuous Glucose Sensor (Dexcom G7 Sensor) misc, Use 1 each Every 10 (Ten) Days., Disp: 9 each, Rfl: 3    ferrous sulfate 324 (65 Fe) MG tablet delayed-release EC tablet, Take 1 tablet by mouth Daily With Breakfast., Disp: , Rfl:     furosemide (LASIX) 40 MG tablet, take 1 tablet by oral route  every day, Disp: 90 tablet, Rfl: 3    gabapentin (NEURONTIN) 300 MG capsule, Take 1 capsule by mouth 3 (Three) Times a Day., Disp: 90 capsule, Rfl: 3    glucose blood (Accu-Chek Guide) test strip, Use as instructed to test blood sugar 4 times daily, Disp: 400 each, Rfl: 1    Insulin Glargine (Lantus SoloStar) 100 UNIT/ML injection pen, Inject 32 Units under the skin into the appropriate area as directed Every Night., Disp: 15 mL, Rfl: 5    Insulin Lispro, 1 Unit Dial, (HumaLOG KwikPen) 100 UNIT/ML solution pen-injector, Inject 10 Units under the skin into the appropriate area as directed 3 (Three) Times a Day With Meals., Disp: 15 mL, Rfl: 5    Insulin Pen Needle 32G X 4 MM misc, Use 1 each 4 (Four) Times a Day Before Meals & at Bedtime., Disp: 400 each, Rfl: 1    Jardiance 25 MG tablet tablet, Take 1 tablet by mouth Every Morning., Disp: 90 tablet, Rfl: 1    Lancet Devices (TRUEdraw Lancing Device) misc, Use 1 each 4 (Four) Times a Day., Disp: 1 each, Rfl: 0    metoprolol tartrate (LOPRESSOR) 25 MG tablet, Take 1 tablet by mouth 2 (Two) Times a Day., Disp: 60 tablet, Rfl: 3    midodrine (PROAMATINE) 2.5 MG tablet, Take 1 tablet by mouth 3 (Three) Times a Day., Disp: 90 tablet, Rfl: 3    Myrbetriq 25 MG tablet sustained-release 24 hour 24 hr tablet, Take 1 tablet by mouth Daily., Disp: 30 tablet, Rfl: 0    pravastatin (Pravachol) 20 MG tablet, Take 1 tablet by mouth Every Evening.,  "Disp: 90 tablet, Rfl: 3    RELION INSULIN SYR 0.5ML/31G 31G X 5/16\" 0.5 ML misc, , Disp: , Rfl:     sodium bicarbonate 650 MG tablet, Take 1 tablet by mouth 3 (Three) Times a Day., Disp: 90 tablet, Rfl: 1    TRUEplus Lancets 30G misc, Use 1 each 4 (Four) Times a Day. Before meals and at bedtime., Disp: 400 each, Rfl: 1    ==========================================================================    ALLERGIES    Allergies   Allergen Reactions    Latex Other (See Comments)    Morphine And Codeine Rash    Codeine Other (See Comments)    Sulfa Antibiotics Other (See Comments)    Cephalexin Diarrhea and Nausea And Vomiting     Extreme vomiting, can't keep food or water down either    Povidone Iodine Rash       ==========================================================================    OBJECTIVE    Vitals:    09/30/24 1238   BP: 114/58   Pulse: 76   SpO2: 97%     Body mass index is 52.56 kg/m².     General: Alert, cooperative, no acute distress  Thyroid:  no enlargement/tenderness/palpable nodules  Lungs: Clear to auscultation bilaterally, respirations unlabored  Heart: Regular rate and rhythm, S1 and S2 normal, no murmur, rub or gallop  Abdomen: Soft, NT, ND and Bowel sounds Positive    ==========================================================================    LAB EVALUATION    Lab Results   Component Value Date    GLUCOSE 165 (H) 09/27/2024    BUN 29 (H) 09/27/2024    CREATININE 1.48 (H) 09/27/2024    EGFRIFAFRI 32 (L) 12/09/2022    BCR 20 09/27/2024    K 3.8 09/27/2024    CO2 23 09/27/2024    CALCIUM 8.7 09/27/2024    PROTENTOTREF 7.5 09/27/2024    ALBUMIN 3.6 (L) 09/27/2024    LABIL2 0.8 (L) 03/27/2024    AST 16 09/27/2024    ALT 15 09/27/2024    CHOL 166 07/11/2023    TRIG 196 (H) 07/03/2024    HDL 25 (L) 07/03/2024    LDL 61 07/03/2024     Lab Results   Component Value Date    HGBA1C 7.4 (H) 08/03/2024    HGBA1C 7.0 (H) 07/03/2024    HGBA1C 7.4 (H) 05/09/2024     Lab Results   Component Value Date    MICROALBUR " 51.1 12/28/2023    CREATININE 1.48 (H) 09/27/2024     Lab Results   Component Value Date    TSH 6.950 (H) 09/27/2024    FREET4 1.26 09/27/2024     ==========================================================================    CGM Data:    Dates: Sep 17, 2024 till Sep 30, 2024    Very High > 250: 12%  High 180 - 250: 61%  Target: 70 - 180: 27%  Low 55 - 70:  0%  Very Low < 55: 0%    Average blood sugar 203 with standard deviation of 38 and GMI 8.2%    ==========================================================================    ASSESSMENT AND PLAN    # Type 2 diabetes with hyperglycemia  # Morbid obesity with BMI 52.56  # Peripheral diabetic neuropathy   # CKD stage III with microalbuminuria, following nephrology as outpatient with Dr. Mccauley    - Reviewed CGM data and patient have significant hyperglycemia  - Will uptitrate the dose of insulin Lantus while maintaining insulin lispro at around 15 units  - Care discussed with pharmacy and will try to get prior authorization for using Trulicity therapy as patient is not able to tolerate Ozempic therapy  - Patient is not a candidate for DPP 4 inhibitor therapy due to concerns of heart failure  - Continue Jardiance therapy without any changes  - New adjusted regimen:  Jardiance 25 mg p.o. daily  Insulin Basaglar 35 units nightly, if needed can go up to 40 units  Insulin lispro 15 units with each meal with SSI  Trulicity 0.75 mgs once a week  - Continue to monitor blood sugar through Dexcom G7    # Hypothyroidism with iatrogenic hyperthyroidism  - Increase levothyroxine to 125 mcg p.o. daily    # Hyperlipidemia  - Continue pravastatin 20 mg    Return to clinic: 3 months    Entire assessment and plan was discussed and counseled the patient in detail to which patient verbalized understanding and agreed with care.  Answered all queries and concerns.    This note was created using voice recognition software and is inherently subject to errors including those of syntax and  "\"sound-alike\" substitutions which may escape proofreading.  In such instances, original meaning may be extrapolated by contextual derivation.    Note: Portions of this note may have been copied from previous notes but documentation have been reviewed and edited as necessary to support clinical decision making for today's visit.    ==========================================================================    INFORMATION PROVIDED TO PATIENT    Patient Instructions   Please,     - Continue Jardiance 25 mgs once a day    - Increase Lantus to 35 units once every night.  (If your fasting blood glucose is still more than 150 then increase to 40 units)    - Use mealtime insulin 15 units with each meal with correction scale    Mealtime insulin needs to be taking 15 minutes before each meal.  Please check your blood sugar before giving mealtime insulin.    - Start Trulicity 0.75 mgs once a week    - Continue to check your blood sugar through Dexcom G7.     If your blood sugar is running less than 100 and fasting then decrease your nighttime insulin by 3 units.    If your blood sugar before lunch, before dinner and before bedtime snack is less than 100 then decrease your mealtime insulin by 2 units.    Use correction scale if needed:    Corrections for High Blood Sugar  Use the following guide to “correct” for pre-meal high blood sugar.  This insulin will help “correct” the high reading.  You will still need to take as per scheduled meal insulin.    If your   Blood Sugar Reading is…. Number of additional   units of Insulin Lispro to Take…  (Correction)    Take 0 additional unit   151-200 Take 1 additional unit   201-250 Take 2 additional unit   251-300 Take 3 additional unit   301-350 Take 4 additional unit   351-400 Take 5 additional unit   More than 400 Take 6 additional unit       Continue pravastatin therapy to 20 mg 1 pill by mouth daily.     For levothyroxine increase to 125 mcg 1 pill by mouth early in the morning " at fasting state and maintain fasting for at least 40 minutes.     Repeat blood work and clinical visit in 3 months.    Thank you for your visit today.     If you have any questions or concerns please feel free to reach out of the office.      ==========================================================================  Ray Narvaez MD  Department of Endocrine, Diabetes and Metabolism  [place], [place]  ==========================================================================

## 2024-10-01 ENCOUNTER — SPECIALTY PHARMACY (OUTPATIENT)
Dept: ENDOCRINOLOGY | Facility: CLINIC | Age: 59
End: 2024-10-01
Payer: MEDICAID

## 2024-10-01 RX ORDER — DULAGLUTIDE 0.75 MG/.5ML
0.75 INJECTION, SOLUTION SUBCUTANEOUS WEEKLY
Qty: 6 ML | Refills: 1 | Status: SHIPPED | OUTPATIENT
Start: 2024-10-01

## 2024-10-01 NOTE — PROGRESS NOTES
" Specialty Pharmacy Patient Management Program  Endocrinology Refill Outreach      Marilyn \"Delphine\" is a 59 y.o. female contacted today regarding refills of her medication(s).    Specialty medication(s) and dose(s) confirmed: Trulicity    Refill Questions      Flowsheet Row Most Recent Value   Changes to allergies? No   Changes to medications? No   New conditions or infections since last clinic visit No   Unplanned office visit, urgent care, ED, or hospital admission in the last 4 weeks  No   How does patient/caregiver feel medication is working? Very good   Financial problems or insurance changes  No   Since the previous refill, were any specialty medication doses or scheduled injections missed or delayed?  No   Does this patient require a clinical escalation to a pharmacist? No          Delivery Questions      Flowsheet Row Most Recent Value   Delivery method  at Pharmacy   Delivery address verified with patient/caregiver? Yes  [Pickup]   Delivery address Other (Comment)  [Pickup]   Number of medications in delivery 1   Medication(s) being filled and delivered Dulaglutide   Copay verified? Yes   Copay amount $0.00   Copay form of payment No copayment ($0)            Follow-Up: *28d d/t current stock and pt needing now, but can and wants to fill 84d at a time in the future.      Juaquin Yu, PharmD, MPH  Clinical Specialty Pharmacist  10/1/2024  10:51 EDT    "

## 2024-10-01 NOTE — PROGRESS NOTES
Specialty Pharmacy Patient Management Program  Prior Authorization Approval     Prior Authorization for Trulicity was Approved    Approval Start Date: 9/30/2024  Approval End Date: 9/30/2025  Authorization / Reference / Case Number: [id number]    CoverMyMeds Key: [id number]    Scheduled new prior authorization renewal outreach task to be completed on 10/1/2025.      Juaquin Yu, PharmD, MPH  Clinical Specialty Pharmacist  10/1/2024  10:58 EDT

## 2024-10-01 NOTE — PROGRESS NOTES
Specialty Pharmacy Patient Management Program       Marilyn Martins is a 59 y.o. female seen by an Endocrinology provider for Type 2 Diabetes and enrolled in the Endocrinology Patient Management program offered by [facility].      Requested Prescriptions     Pending Prescriptions Disp Refills    Dulaglutide (Trulicity) 0.75 MG/0.5ML solution pen-injector 6 mL 1     Sig: Inject 0.75 mg under the skin into the appropriate area as directed 1 (One) Time Per Week.       Refill sent in to patient's pharmacy for above medication prescribed per telephone order by Dr. Narvaez.   Last office visit 9/30/2024.  Next visit scheduled 1/22/2025.      Juaquin Yu, PharmD, MPH  Clinical Specialty Pharmacist, Endocrinology  10/1/2024  10:46 EDT

## 2024-10-29 DIAGNOSIS — R35.1 FREQUENT URINATION AT NIGHT: ICD-10-CM

## 2024-10-29 RX ORDER — MIRABEGRON 25 MG/1
25 TABLET, FILM COATED, EXTENDED RELEASE ORAL DAILY
Qty: 30 TABLET | Refills: 0 | Status: SHIPPED | OUTPATIENT
Start: 2024-10-29

## 2024-10-31 ENCOUNTER — SPECIALTY PHARMACY (OUTPATIENT)
Dept: ENDOCRINOLOGY | Facility: CLINIC | Age: 59
End: 2024-10-31
Payer: MEDICAID

## 2024-10-31 NOTE — PROGRESS NOTES
"Specialty Pharmacy Refill Coordination Note     Marilyn \"Delphine\" is a 59 y.o. female contacted today regarding refills of her specialty medication(s).    Specialty medication(s) and dose(s) confirmed: yes  Changes to medications: no  Changes to insurance: no  Reviewed and verified with patient:         Refill Questions      Flowsheet Row Most Recent Value   Changes to allergies? No   Changes to medications? No   New conditions or infections since last clinic visit No   Unplanned office visit, urgent care, ED, or hospital admission in the last 4 weeks  No   How does patient/caregiver feel medication is working? Good   Financial problems or insurance changes  No   Since the previous refill, were any specialty medication doses or scheduled injections missed or delayed?  No   Does this patient require a clinical escalation to a pharmacist? No            Delivery Questions      Flowsheet Row Most Recent Value   Delivery method  at Pharmacy   Number of medications in delivery 14   Medication(s) being filled and delivered Glucose Blood (Accu-Chek Guide), Insulin Pen Needle, Cetirizine HCl (zyrTEC), Continuous Glucose Sensor (Dexcom G7 Sensor), Apixaban (ELIQUIS), Furosemide (LASIX), Gabapentin (NEURONTIN), Insulin Lispro (HUMALOG), Insulin Glargine (Lantus SoloStar), Metoprolol Tartrate (LOPRESSOR), Midodrine HCl (PROAMATINE), Mirabegron (Myrbetriq), Lancets (TRUEplus Lancets 30G), Dulaglutide (Trulicity)   Copay verified? Yes   Copay amount 0.00   Copay form of payment No copayment ($0)   Signature Required No                 Follow-up: 1 month(s)     Shirley Arellano, Pharmacy Technician  Specialty Pharmacy Technician   10/31/2024   12:57 EDT      "

## 2024-11-04 RX ORDER — SODIUM BICARBONATE 650 MG/1
650 TABLET ORAL 3 TIMES DAILY
Qty: 90 TABLET | Refills: 1 | Status: SHIPPED | OUTPATIENT
Start: 2024-11-04

## 2024-11-04 NOTE — TELEPHONE ENCOUNTER
Rx Refill Note  Requested Prescriptions     Pending Prescriptions Disp Refills   • sodium bicarbonate 650 MG tablet 90 tablet 1     Sig: Take 1 tablet by mouth 3 (Three) Times a Day.      Last office visit with prescribing clinician: 8/29/2024      Next office visit with prescribing clinician: 2/27/2025   3}  Mago Redd  11/04/24, 07:57 EST

## 2024-12-04 ENCOUNTER — SPECIALTY PHARMACY (OUTPATIENT)
Dept: ENDOCRINOLOGY | Facility: CLINIC | Age: 59
End: 2024-12-04
Payer: MEDICAID

## 2024-12-04 ENCOUNTER — TELEPHONE (OUTPATIENT)
Dept: ENDOCRINOLOGY | Facility: CLINIC | Age: 59
End: 2024-12-04
Payer: MEDICAID

## 2024-12-04 DIAGNOSIS — E11.65 TYPE 2 DIABETES MELLITUS WITH HYPERGLYCEMIA, WITH LONG-TERM CURRENT USE OF INSULIN: ICD-10-CM

## 2024-12-04 DIAGNOSIS — Z79.4 TYPE 2 DIABETES MELLITUS WITH HYPERGLYCEMIA, WITH LONG-TERM CURRENT USE OF INSULIN: ICD-10-CM

## 2024-12-04 DIAGNOSIS — E11.69 TYPE 2 DIABETES MELLITUS WITH OTHER SPECIFIED COMPLICATION, WITH LONG-TERM CURRENT USE OF INSULIN: ICD-10-CM

## 2024-12-04 DIAGNOSIS — E11.42 DIABETIC PERIPHERAL NEUROPATHY: ICD-10-CM

## 2024-12-04 DIAGNOSIS — Z79.4 TYPE 2 DIABETES MELLITUS WITH OTHER SPECIFIED COMPLICATION, WITH LONG-TERM CURRENT USE OF INSULIN: ICD-10-CM

## 2024-12-04 NOTE — TELEPHONE ENCOUNTER
Specialty Pharmacy Patient Management Program       Marilyn Martins is a 59 y.o. female seen by an Endocrinology provider for Type 2 Diabetes and enrolled in the Endocrinology Patient Management program offered by [facility].      Requested Prescriptions     Pending Prescriptions Disp Refills    glucose blood (Accu-Chek Guide) test strip 400 each 1     Sig: Use as instructed to test blood sugar 4 times daily       Refill sent in to patient's pharmacy for above medication prescribed per telephone order by Dr. Narvaez.   Last office visit 9/30/2024.  Next visit scheduled 1/22/2025.      Juaquin Yu, PharmD, MPH  Clinical Specialty Pharmacist, Endocrinology  12/4/2024  11:48 EST

## 2024-12-04 NOTE — PROGRESS NOTES
"Specialty Pharmacy Refill Coordination Note     Marilyn \"Delphine\" is a 59 y.o. female contacted today regarding refills of her specialty medication(s).    Specialty medication(s) and dose(s) confirmed: Yes  Changes to medications: no  Changes to insurance: no  Reviewed and verified with patient:         Refill Questions      Flowsheet Row Most Recent Value   Changes to allergies? No   Changes to medications? No   New conditions or infections since last clinic visit No   Unplanned office visit, urgent care, ED, or hospital admission in the last 4 weeks  No   How does patient/caregiver feel medication is working? Good   Financial problems or insurance changes  No   Since the previous refill, were any specialty medication doses or scheduled injections missed or delayed?  No   Does this patient require a clinical escalation to a pharmacist? No            Delivery Questions      Flowsheet Row Most Recent Value   Delivery method  at Pharmacy   Number of medications in delivery 1   Medication(s) being filled and delivered Empagliflozin (Jardiance)   Copay verified? Yes   Copay amount $0.00   Copay form of payment No copayment ($0)   Signature Required No                 Follow-up: 3 month(s)     Myriam Gamboa, Pharmacy Technician  Specialty Pharmacy Technician   12/4/2024   10:23 EST         "

## 2024-12-04 NOTE — PROGRESS NOTES
"Specialty Pharmacy Refill Coordination Note     Marilyn \"Delphine\" is a 59 y.o. female contacted today regarding refills of her specialty medication(s).    Specialty medication(s) and dose(s) confirmed: Yes  Changes to medications: no  Changes to insurance: no  Reviewed and verified with patient:         Refill Questions      Flowsheet Row Most Recent Value   Changes to allergies? No   Changes to medications? No   New conditions or infections since last clinic visit No   Unplanned office visit, urgent care, ED, or hospital admission in the last 4 weeks  No   How does patient/caregiver feel medication is working? Good   Financial problems or insurance changes  No   Since the previous refill, were any specialty medication doses or scheduled injections missed or delayed?  No   Does this patient require a clinical escalation to a pharmacist? No            Delivery Questions      Flowsheet Row Most Recent Value   Delivery method  at Pharmacy   Number of medications in delivery 4   Medication(s) being filled and delivered Glucose Blood (Accu-Chek Guide), Continuous Glucose Sensor (Dexcom G7 Sensor), Cetirizine HCl (zyrTEC), Sodium Bicarbonate   Copay verified? Yes   Copay amount $0.00   Ship Date 12/05/2024   Delivery Date 12/05/2024   Signature Required No                 Follow-up: 1 month(s)     Myriam Gamboa, Pharmacy Technician  Specialty Pharmacy Technician   12/4/2024   10:42 EST      "

## 2024-12-26 ENCOUNTER — SPECIALTY PHARMACY (OUTPATIENT)
Dept: ENDOCRINOLOGY | Facility: CLINIC | Age: 59
End: 2024-12-26
Payer: MEDICAID

## 2024-12-26 DIAGNOSIS — R35.1 FREQUENT URINATION AT NIGHT: ICD-10-CM

## 2024-12-26 RX ORDER — SODIUM BICARBONATE 650 MG/1
650 TABLET ORAL 3 TIMES DAILY
Qty: 90 TABLET | Refills: 1 | Status: SHIPPED | OUTPATIENT
Start: 2024-12-26

## 2024-12-26 RX ORDER — MIRABEGRON 25 MG/1
25 TABLET, FILM COATED, EXTENDED RELEASE ORAL DAILY
Qty: 90 TABLET | Refills: 1 | Status: SHIPPED | OUTPATIENT
Start: 2024-12-26

## 2024-12-26 NOTE — PROGRESS NOTES
"Specialty Pharmacy Refill Coordination Note     Marilyn \"Delphine\" is a 59 y.o. female contacted today regarding refills of her specialty medication(s).    Specialty medication(s) and dose(s) confirmed: Yes  Changes to medications: no  Changes to insurance: no  Reviewed and verified with patient:         Refill Questions      Flowsheet Row Most Recent Value   Changes to allergies? No   Changes to medications? No   New conditions or infections since last clinic visit No   Unplanned office visit, urgent care, ED, or hospital admission in the last 4 weeks  No   How does patient/caregiver feel medication is working? Good   Financial problems or insurance changes  No   Since the previous refill, were any specialty medication doses or scheduled injections missed or delayed?  No   Does this patient require a clinical escalation to a pharmacist? No                     Follow-up: 3 month(s)     Myriam Gamboa, Pharmacy Technician  Specialty Pharmacy Technician   12/26/2024   11:43 EST      "

## 2024-12-26 NOTE — TELEPHONE ENCOUNTER
Rx Refill Note  Requested Prescriptions     Pending Prescriptions Disp Refills   • Myrbetriq 25 MG tablet sustained-release 24 hour 24 hr tablet 30 tablet 0     Sig: Take 1 tablet by mouth Daily.   • sodium bicarbonate 650 MG tablet 90 tablet 1     Sig: Take 1 tablet by mouth 3 (Three) Times a Day.      Last office visit with prescribing clinician: 8/29/2024   Last telemedicine visit with prescribing clinician: Visit date not found   Next office visit with prescribing clinician: 2/27/2025       Would you like a call back once the refill request has been completed: [] Yes [] No    If the office needs to give you a call back, can they leave a voicemail: [] Yes [] No    Queta Rao MA  12/26/24, 11:52 EST

## 2025-01-19 LAB
ALBUMIN SERPL-MCNC: 3.8 G/DL (ref 3.9–4.9)
ALBUMIN/CREAT UR: 40 MG/G CREAT (ref 0–29)
ALP SERPL-CCNC: 132 IU/L (ref 44–121)
ALT SERPL-CCNC: 14 IU/L (ref 0–32)
AMBIG ABBREV CMP14 DEFAULT: NORMAL
AST SERPL-CCNC: 15 IU/L (ref 0–40)
BILIRUB SERPL-MCNC: 0.3 MG/DL (ref 0–1.2)
BUN SERPL-MCNC: 23 MG/DL (ref 8–27)
BUN/CREAT SERPL: 15 (ref 12–28)
CALCIUM SERPL-MCNC: 9.3 MG/DL (ref 8.7–10.3)
CHLORIDE SERPL-SCNC: 97 MMOL/L (ref 96–106)
CO2 SERPL-SCNC: 23 MMOL/L (ref 20–29)
CREAT SERPL-MCNC: 1.54 MG/DL (ref 0.57–1)
CREAT UR-MCNC: 104.4 MG/DL
EGFRCR SERPLBLD CKD-EPI 2021: 38 ML/MIN/1.73
GLOBULIN SER CALC-MCNC: 3.7 G/DL (ref 1.5–4.5)
GLUCOSE SERPL-MCNC: 161 MG/DL (ref 70–99)
HBA1C MFR BLD: 7.8 % (ref 4.8–5.6)
MICROALBUMIN UR-MCNC: 42.2 UG/ML
POTASSIUM SERPL-SCNC: 4.1 MMOL/L (ref 3.5–5.2)
PROT SERPL-MCNC: 7.5 G/DL (ref 6–8.5)
SODIUM SERPL-SCNC: 138 MMOL/L (ref 134–144)
T4 FREE SERPL-MCNC: 1.43 NG/DL (ref 0.82–1.77)
TSH SERPL DL<=0.005 MIU/L-ACNC: 3.36 UIU/ML (ref 0.45–4.5)

## 2025-01-22 ENCOUNTER — OFFICE VISIT (OUTPATIENT)
Dept: ENDOCRINOLOGY | Facility: CLINIC | Age: 60
End: 2025-01-22
Payer: MEDICAID

## 2025-01-22 VITALS
HEART RATE: 75 BPM | WEIGHT: 282 LBS | OXYGEN SATURATION: 98 % | SYSTOLIC BLOOD PRESSURE: 128 MMHG | DIASTOLIC BLOOD PRESSURE: 82 MMHG | HEIGHT: 61 IN | BODY MASS INDEX: 53.24 KG/M2

## 2025-01-22 DIAGNOSIS — E11.42 DIABETIC PERIPHERAL NEUROPATHY: ICD-10-CM

## 2025-01-22 DIAGNOSIS — N18.32 STAGE 3B CHRONIC KIDNEY DISEASE: ICD-10-CM

## 2025-01-22 DIAGNOSIS — Z79.4 TYPE 2 DIABETES MELLITUS WITH HYPERGLYCEMIA, WITH LONG-TERM CURRENT USE OF INSULIN: Primary | ICD-10-CM

## 2025-01-22 DIAGNOSIS — E66.01 MORBID OBESITY: ICD-10-CM

## 2025-01-22 DIAGNOSIS — E78.49 OTHER HYPERLIPIDEMIA: ICD-10-CM

## 2025-01-22 DIAGNOSIS — E11.65 TYPE 2 DIABETES MELLITUS WITH HYPERGLYCEMIA, WITH LONG-TERM CURRENT USE OF INSULIN: Primary | ICD-10-CM

## 2025-01-22 DIAGNOSIS — E06.3 HYPOTHYROIDISM DUE TO HASHIMOTO'S THYROIDITIS: ICD-10-CM

## 2025-01-22 NOTE — PROGRESS NOTES
-----------------------------------------------------------------  ENDOCRINE CLINIC NOTE  -----------------------------------------------------------------        PATIENT NAME: Marilyn Martins  PATIENT : 1965 AGE: 59 y.o.  MRN NUMBER: [id number]  PRIMARY CARE: Sondra Thomas APRN    ==========================================================================    CHIEF COMPLAINT: Type 2 Diabetes Mellitus and Hypothyrodism  DATE OF SERVICE: 25    ==========================================================================    HPI / SUBJECTIVE    59 y.o. female is seen in the clinic today for follow-up of type 2 Diabetes and Hypothyroidism.    Type 2 Diabetes Mellitus:  Diagnosed with type 2 diabetes around   Current therapy:  Jardiance 25 mg p.o. daily  Insulin lispro 15 units with each meal with SSI  Trulicity 0.75 mgs once a week  Medications not taking:  Insulin Basaglar stopped the long acting for almost more than a month  Ozempic due to abdominal pain and unable to eat for few days  Januvia discontinued by Tadeo due to heart failure  Underlying history of CKD status post right nephrectomy in 2002  Currently checking blood sugar through G7 Dexcom.  Following [facility], last visit in Oct 2024.  Reports to have mild neuropathy in legs.  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     Hypothyroidism:  Diagnosed with hypothyroidism in .  Current dose is 125 mcg daily.  Recently had blood work which is stable.    ==========================================================================                                                PAST MEDICAL HISTORY    Past Medical History:   Diagnosis Date    A-fib     Atrial fibrillation with rapid ventricular rate    Allergic     Anemia     Anxiety     Arthritis     Asthma     Callus     Cataract     CHF (congestive heart failure)     Cholelithiasis     Gall  bladder removed    Chronic constipation     Chronic diarrhea     COPD (chronic obstructive pulmonary disease) 2003    Depression     Diabetes mellitus     Diabetes mellitus     Difficulty walking     Unable to walk due to left leg amputated    Diverticulosis 2022    GERD (gastroesophageal reflux disease) 2005    Gestational diabetes     1&3 child    Hashimoto's thyroiditis     Headache     Heart murmur 2007    Sometimes heard    History of medical problems April 8,2024    A-fib with rvr    History of transfusion     Last done on Dec 8th 2022 for right kidney removel    HL (hearing loss) 2012    Left ear drum is busted, hole in right ear, get excess of build up in both ears    Hyperlipidemia 2000    Hypothyroidism 1986    Uncontroled    Kidney stone 2022    Left side two removed by lazer surgery,right kidney removed Dec.8th of this year.    Low back pain 2001    Neuropathy in diabetes     Obesity 1989    Osteoporosis     Pneumonia 2022 March of this year    Renal insufficiency 2016    Shortness of breath     Sinusitis     Substance abuse     Type 2 diabetes mellitus     Urinary tract infection     Visual impairment 1985       ==========================================================================    PAST SURGICAL HISTORY    Past Surgical History:   Procedure Laterality Date    ABOVE KNEE AMPUTATION Left 02/2000    ANKLE OPEN REDUCTION INTERNAL FIXATION      Before on left ankle    CHOLECYSTECTOMY  2002    COLONOSCOPY  2004    EYE SURGERY  2014    Carrects    FOOT SURGERY      Had broken left ankle before amputation    HERNIA REPAIR      HYSTERECTOMY      JOINT REPLACEMENT      SUBTOTAL HYSTERECTOMY      TOENAIL EXCISION      Right big toe nail    URETERAL STENT INSERTION         ==========================================================================    FAMILY HISTORY    Family History   Problem Relation Age of Onset    Anxiety disorder Daughter     Developmental Disability Daughter     Diabetes Daughter          Pre diabetic with first child    Learning disabilities Daughter     Mental illness Daughter         Cutter, anything to harm herself, hanging, burning etc.    Thyroid disease Daughter         Swollen glands, pregnant with second child    Anxiety disorder Daughter     Developmental Disability Daughter     Diabetes Daughter     Learning disabilities Daughter     Mental illness Daughter         Cutters    Developmental Disability Son     Learning disabilities Son     Diabetes Mother        ==========================================================================    SOCIAL HISTORY    Social History     Socioeconomic History    Marital status:     Number of children: 3    Years of education: 12   Tobacco Use    Smoking status: Never    Smokeless tobacco: Never   Vaping Use    Vaping status: Never Used   Substance and Sexual Activity    Alcohol use: Yes     Comment: Wine coolers and sleeping pills together 1996    Drug use: Never    Sexual activity: Not Currently     Partners: Male     Birth control/protection: Hysterectomy       ==========================================================================    MEDICATIONS      Current Outpatient Medications:     albuterol sulfate  (90 Base) MCG/ACT inhaler, Inhale 2 puffs every 6 hours as needed for wheezing and shortness of breath, Disp: 18 g, Rfl: 1    apixaban (ELIQUIS) 5 MG tablet tablet, Take 1 tablet by mouth Every 12 (Twelve) Hours., Disp: 60 tablet, Rfl: 3    Blood Glucose Monitoring Suppl (Accu-Chek Guide) w/Device kit, 1 each 4 (Four) Times a Day After Meals & at Bedtime., Disp: 1 kit, Rfl: 0    cetirizine (Allergy Relief Cetirizine) 10 MG tablet, Take 1 tablet by mouth Daily., Disp: 30 tablet, Rfl: 3    Continuous Glucose Sensor (Dexcom G7 Sensor) misc, Use 1 each Every 10 (Ten) Days., Disp: 9 each, Rfl: 3    ferrous sulfate 324 (65 Fe) MG tablet delayed-release EC tablet, Take 1 tablet by mouth Daily With Breakfast., Disp: , Rfl:     furosemide  "(LASIX) 40 MG tablet, take 1 tablet by oral route  every day, Disp: 90 tablet, Rfl: 3    gabapentin (NEURONTIN) 300 MG capsule, Take 1 capsule by mouth 3 (Three) Times a Day., Disp: 90 capsule, Rfl: 3    glucose blood test strip, Use as instructed to test blood sugar 4 times daily, Disp: 400 each, Rfl: 1    Insulin Glargine (Lantus SoloStar) 100 UNIT/ML injection pen, Inject 40 Units under the skin into the appropriate area as directed Every Night., Disp: 30 mL, Rfl: 3    Insulin Lispro, 1 Unit Dial, (HumaLOG KwikPen) 100 UNIT/ML solution pen-injector, Inject 21 Units under the skin into the appropriate area as directed 3 (Three) Times a Day With Meals., Disp: 15 mL, Rfl: 5    Insulin Pen Needle 32G X 4 MM misc, Use 1 each 4 (Four) Times a Day Before Meals & at Bedtime., Disp: 400 each, Rfl: 1    Jardiance 25 MG tablet tablet, Take 1 tablet by mouth Every Morning., Disp: 90 tablet, Rfl: 1    Lancet Devices (TRUEdraw Lancing Device) misc, Use 1 each 4 (Four) Times a Day., Disp: 1 each, Rfl: 0    levothyroxine (Synthroid) 125 MCG tablet, Take 1 tablet by mouth Daily for 180 days., Disp: 90 tablet, Rfl: 1    metoprolol tartrate (LOPRESSOR) 25 MG tablet, Take 1 tablet by mouth 2 (Two) Times a Day., Disp: 60 tablet, Rfl: 3    midodrine (PROAMATINE) 5 MG tablet, Take 2 tablets by mouth 3 (Three) Times a Day. (Patient taking differently: Take 1 tablet by mouth 3 (Three) Times a Day. 1 TABLET 3 TIMES DAILY), Disp: 90 tablet, Rfl: 11    Myrbetriq 25 MG tablet sustained-release 24 hour 24 hr tablet, Take 1 tablet by mouth Daily., Disp: 90 tablet, Rfl: 1    pravastatin (Pravachol) 20 MG tablet, Take 1 tablet by mouth Every Evening., Disp: 90 tablet, Rfl: 3    RELION INSULIN SYR 0.5ML/31G 31G X 5/16\" 0.5 ML misc, , Disp: , Rfl:     sodium bicarbonate 650 MG tablet, Take 1 tablet by mouth 3 (Three) Times a Day., Disp: 90 tablet, Rfl: 1    TRUEplus Lancets 30G misc, Use 1 each 4 (Four) Times a Day. Before meals and at bedtime., " Disp: 400 each, Rfl: 1    ==========================================================================    ALLERGIES    Allergies   Allergen Reactions    Latex Other (See Comments)    Morphine And Codeine Rash    Codeine Other (See Comments)    Sulfa Antibiotics Other (See Comments)    Cephalexin Diarrhea and Nausea And Vomiting     Extreme vomiting, can't keep food or water down either    Povidone Iodine Rash       ==========================================================================    OBJECTIVE    Vitals:    01/22/25 0924   BP: 128/82   Pulse: 75   SpO2: 98%     Body mass index is 53.32 kg/m².     General: Alert, cooperative, no acute distress  Thyroid:  no enlargement/tenderness/palpable nodules  Lungs: Clear to auscultation bilaterally, respirations unlabored  Heart: Regular rate and rhythm, S1 and S2 normal, no murmur, rub or gallop  Abdomen: Soft, NT, ND and Bowel sounds Positive    ==========================================================================    LAB EVALUATION    Lab Results   Component Value Date    GLUCOSE 161 (H) 01/18/2025    BUN 23 01/18/2025    CREATININE 1.54 (H) 01/18/2025    EGFRIFAFRI 32 (L) 12/09/2022    BCR 15 01/18/2025    K 4.1 01/18/2025    CO2 23 01/18/2025    CALCIUM 9.3 01/18/2025    PROTENTOTREF 7.5 01/18/2025    ALBUMIN 3.8 (L) 01/18/2025    LABIL2 0.8 (L) 03/27/2024    AST 15 01/18/2025    ALT 14 01/18/2025    CHOL 166 07/11/2023    TRIG 196 (H) 07/03/2024    HDL 25 (L) 07/03/2024    LDL 61 07/03/2024     Lab Results   Component Value Date    HGBA1C 7.8 (H) 01/18/2025    HGBA1C 7.4 (H) 08/03/2024    HGBA1C 7.0 (H) 07/03/2024     Lab Results   Component Value Date    MICROALBUR 42.2 01/18/2025    CREATININE 1.54 (H) 01/18/2025     Lab Results   Component Value Date    TSH 3.360 01/18/2025    FREET4 1.43 01/18/2025     ==========================================================================    CGM Data:    Dates: Jan 9 till Jan 22, 2025    Very High > 250: 2%  High 180 -  "250: 48%  Target: 70 - 180: 50%  Low 55 - 70:  0%  Very Low < 55: 0%    Average blood sugar 183 with standard deviation of 30 and GMI 7.7%    ==========================================================================    ASSESSMENT AND PLAN    # Type 2 diabetes with hyperglycemia  # Morbid obesity with BMI 53.32  # Peripheral diabetic neuropathy   # CKD stage III with microalbuminuria, following nephrology as outpatient with Dr. Mccauley    - Reviewed CGM data and blood sugar have significantly improved as compared to previous readings but patient is having still fasting hyperglycemia  - Discussed with patient and will uptitrate Trulicity therapy to 1.5 mg once a week  - Also after discussing with patient we will plan restarting Basaglar therapy, since she is scared about hypoglycemia therefore she stopped taking Basaglar therapy will start at a lower dose of 15 units of Basaglar every night and titrate higher or lower if needed  - Patient is not a good candidate for DPP 4 inhibitor therapy due to concerns of heart failure  - New adjusted regimen:  Jardiance 25 mg p.o. daily  Insulin Basaglar 15 units nightly  Insulin lispro 15 units with each meal with SSI  Trulicity 1.5 mgs once a week  - Continue to monitor blood sugar through Dexcom G7    # Hypothyroidism with iatrogenic hyperthyroidism  - Continue levothyroxine to 125 mcg p.o. daily    # Hyperlipidemia  - Continue pravastatin 20 mg    Return to clinic: 3 months    Entire assessment and plan was discussed and counseled the patient in detail to which patient verbalized understanding and agreed with care.  Answered all queries and concerns.    This note was created using voice recognition software and is inherently subject to errors including those of syntax and \"sound-alike\" substitutions which may escape proofreading.  In such instances, original meaning may be extrapolated by contextual derivation.    Note: Portions of this note may have been copied from previous " notes but documentation have been reviewed and edited as necessary to support clinical decision making for today's visit.    ==========================================================================    INFORMATION PROVIDED TO PATIENT    Patient Instructions   Please,     - Continue Jardiance 25 mgs once a day    - Restart Lantus at 15 units once every night.    - Use mealtime insulin 15 units with each meal with correction scale    Mealtime insulin needs to be taking 15 minutes before each meal.  Please check your blood sugar before giving mealtime insulin.    - Increase Trulicity to 1.5 mgs once a week    - Continue to check your blood sugar through Dexcom G7.     If your blood sugar is running less than 100 and fasting then decrease your nighttime insulin by 3 units.    If your blood sugar before lunch, before dinner and before bedtime snack is less than 100 then decrease your mealtime insulin by 2 units.    Use correction scale if needed:    Corrections for High Blood Sugar  Use the following guide to “correct” for pre-meal high blood sugar.  This insulin will help “correct” the high reading.  You will still need to take as per scheduled meal insulin.    If your   Blood Sugar Reading is…. Number of additional   units of Insulin Lispro to Take…  (Correction)    Take 0 additional unit   151-200 Take 1 additional unit   201-250 Take 2 additional unit   251-300 Take 3 additional unit   301-350 Take 4 additional unit   351-400 Take 5 additional unit   More than 400 Take 6 additional unit       Continue pravastatin therapy to 20 mg 1 pill by mouth daily.     Continue levothyroxine 125 mcg 1 pill by mouth early in the morning at fasting state and maintain fasting for at least 40 minutes. Will plan for repeat blood work for thyroid in 6 months.     Repeat blood work for diabetes and clinical visit in 3 months.    Thank you for your visit today.     If you have any questions or concerns please feel free to reach  out of the office.      ==========================================================================  Ray Narvaez MD  Department of Endocrine, Diabetes and Metabolism  [facility], [place]  ==========================================================================

## 2025-01-22 NOTE — PATIENT INSTRUCTIONS
Please,     - Continue Jardiance 25 mgs once a day    - Restart Lantus at 15 units once every night.    - Use mealtime insulin 15 units with each meal with correction scale    Mealtime insulin needs to be taking 15 minutes before each meal.  Please check your blood sugar before giving mealtime insulin.    - Increase Trulicity to 1.5 mgs once a week    - Continue to check your blood sugar through Dexcom G7.     If your blood sugar is running less than 100 and fasting then decrease your nighttime insulin by 3 units.    If your blood sugar before lunch, before dinner and before bedtime snack is less than 100 then decrease your mealtime insulin by 2 units.    Use correction scale if needed:    Corrections for High Blood Sugar  Use the following guide to “correct” for pre-meal high blood sugar.  This insulin will help “correct” the high reading.  You will still need to take as per scheduled meal insulin.    If your   Blood Sugar Reading is…. Number of additional   units of Insulin Lispro to Take…  (Correction)    Take 0 additional unit   151-200 Take 1 additional unit   201-250 Take 2 additional unit   251-300 Take 3 additional unit   301-350 Take 4 additional unit   351-400 Take 5 additional unit   More than 400 Take 6 additional unit       Continue pravastatin therapy to 20 mg 1 pill by mouth daily.     Continue levothyroxine 125 mcg 1 pill by mouth early in the morning at fasting state and maintain fasting for at least 40 minutes. Will plan for repeat blood work for thyroid in 6 months.     Repeat blood work for diabetes and clinical visit in 3 months.    Thank you for your visit today.     If you have any questions or concerns please feel free to reach out of the office.

## 2025-01-30 ENCOUNTER — SPECIALTY PHARMACY (OUTPATIENT)
Dept: ENDOCRINOLOGY | Facility: CLINIC | Age: 60
End: 2025-01-30
Payer: MEDICAID

## 2025-01-30 RX ORDER — METOPROLOL TARTRATE 25 MG/1
25 TABLET, FILM COATED ORAL 2 TIMES DAILY
Qty: 60 TABLET | Refills: 3 | Status: SHIPPED | OUTPATIENT
Start: 2025-01-30

## 2025-01-30 NOTE — PROGRESS NOTES
"Specialty Pharmacy Refill Coordination Note     Marilyn \"Delphine\" is a 59 y.o. female contacted today regarding refills of her specialty medication(s).    Specialty medication(s) and dose(s) confirmed: Yes  Changes to medications: no  Changes to insurance: no  Reviewed and verified with patient:         Refill Questions      Flowsheet Row Most Recent Value   Changes to allergies? No   Changes to medications? No   New conditions or infections since last clinic visit No   Unplanned office visit, urgent care, ED, or hospital admission in the last 4 weeks  No   How does patient/caregiver feel medication is working? Good   Financial problems or insurance changes  No   Since the previous refill, were any specialty medication doses or scheduled injections missed or delayed?  No   Does this patient require a clinical escalation to a pharmacist? No            Delivery Questions      Flowsheet Row Most Recent Value   Delivery method  at Pharmacy   Number of medications in delivery 5   Medication(s) being filled and delivered Cetirizine HCl (zyrTEC), Insulin Pen Needle, Continuous Glucose Sensor (Dexcom G7 Sensor), Furosemide (LASIX), Sodium Bicarbonate   Copay verified? Yes   Copay amount $0.00   Copay form of payment No copayment ($0)   Ship Date 01/30/2025   Delivery Date Selection 01/30/25   Signature Required No                 Follow-up: 1 month(s)     Myriam Gamboa, Pharmacy Technician  Specialty Pharmacy Technician   1/30/2025   13:44 EST         "

## 2025-01-30 NOTE — PROGRESS NOTES
"Specialty Pharmacy Refill Coordination Note     Marilyn \"Delphine\" is a 59 y.o. female contacted today regarding refills of her specialty medication(s).    Specialty medication(s) and dose(s) confirmed: Yes  Changes to medications: no  Changes to insurance: no  Reviewed and verified with patient:         Refill Questions      Flowsheet Row Most Recent Value   Changes to allergies? No   Changes to medications? No   New conditions or infections since last clinic visit No   Unplanned office visit, urgent care, ED, or hospital admission in the last 4 weeks  No   How does patient/caregiver feel medication is working? Good   Financial problems or insurance changes  No   Since the previous refill, were any specialty medication doses or scheduled injections missed or delayed?  No   Does this patient require a clinical escalation to a pharmacist? No            Delivery Questions      Flowsheet Row Most Recent Value   Delivery method  at Pharmacy   Number of medications in delivery 5   Medication(s) being filled and delivered Insulin Pen Needle, Sodium Bicarbonate, Dulaglutide, Furosemide (LASIX), Continuous Glucose Sensor (Dexcom G7 Sensor)   Copay verified? Yes   Copay amount $0.00   Copay form of payment No copayment ($0)   Ship Date 01/30/2025   Delivery Date Selection 01/30/25   Signature Required No                 Follow-up: 1 month(s)     Myriam Gamboa, Pharmacy Technician  Specialty Pharmacy Technician   1/30/2025   12:20 EST      "

## 2025-02-18 ENCOUNTER — SPECIALTY PHARMACY (OUTPATIENT)
Dept: ENDOCRINOLOGY | Facility: CLINIC | Age: 60
End: 2025-02-18
Payer: MEDICAID

## 2025-02-18 NOTE — PROGRESS NOTES
Specialty Pharmacy Patient Management Program  Endocrinology Reassessment     Marilyn Martins was referred by an Endocrinology provider to the Endocrinology Patient Management program offered by [facility] for Type 2 Diabetes. A follow-up outreach was conducted, including assessment of continued therapy appropriateness, medication adherence, and side effect incidence and management for Insulin Glargine, Jardiance, and Trulicity.    Changes to Insurance Coverage or Financial Support  No changes at this time    Relevant Past Medical History and Comorbidities  Relevant medical history and concomitant health conditions were discussed with the patient. The patient's chart has been reviewed for relevant past medical history and comorbid health conditions and updated as necessary.   Past Medical History:   Diagnosis Date    A-fib     Atrial fibrillation with rapid ventricular rate    Allergic     Anemia     Anxiety     Arthritis     Asthma     Callus     Cataract     CHF (congestive heart failure)     Cholelithiasis 1987    Gall bladder removed    Chronic constipation     Chronic diarrhea     COPD (chronic obstructive pulmonary disease) 2003    Depression     Diabetes mellitus     Diabetes mellitus     Difficulty walking     Unable to walk due to left leg amputated    Diverticulosis 2022    GERD (gastroesophageal reflux disease) 2005    Gestational diabetes     1&3 child    Hashimoto's thyroiditis     Headache     Heart murmur 2007    Sometimes heard    History of medical problems April 8,2024    A-fib with rvr    History of transfusion     Last done on Dec 8th 2022 for right kidney removel    HL (hearing loss) 2012    Left ear drum is busted, hole in right ear, get excess of build up in both ears    Hyperlipidemia 2000    Hypothyroidism 1986    Uncontroled    Kidney stone 2022    Left side two removed by lazer surgery,right kidney removed Dec.8th of this year.    Low back pain 2001    Neuropathy  in diabetes     Obesity 1989    Osteoporosis     Pneumonia 2022 March of this year    Renal insufficiency 2016    Shortness of breath     Sinusitis     Substance abuse     Type 2 diabetes mellitus     Urinary tract infection     Visual impairment 1985     Social History     Socioeconomic History    Marital status:     Number of children: 3    Years of education: 12   Tobacco Use    Smoking status: Never    Smokeless tobacco: Never   Vaping Use    Vaping status: Never Used   Substance and Sexual Activity    Alcohol use: Yes     Comment: Wine coolers and sleeping pills together 1996    Drug use: Never    Sexual activity: Not Currently     Partners: Male     Birth control/protection: Hysterectomy     Problem list reviewed by Juaquin Yu, PharmD on 2/18/2025 at  3:41 PM    Hospitalizations and Urgent Care Since Last Assessment  ED Visits, Admissions, or Hospitalizations: None  Urgent Office Visits: None    Allergies  Known allergies and reactions were discussed with the patient. The patient's chart has been reviewed for allergy information and updated as necessary.   Allergies   Allergen Reactions    Latex Other (See Comments)    Morphine And Codeine Rash    Codeine Other (See Comments)    Sulfa Antibiotics Other (See Comments)    Cephalexin Diarrhea and Nausea And Vomiting     Extreme vomiting, can't keep food or water down either    Povidone Iodine Rash     Allergies reviewed by Juaquin Yu, PharmD on 2/18/2025 at  3:40 PM    Relevant Laboratory Values  Relevant laboratory values were discussed with the patient. The following specialty medication dose adjustment(s) are recommended: Phone Reassessment - A1c continues to be on track with goal but still slightly above goal range of <7%.  Will continue to follow and monitor.     A1C Last 3 Results          7/3/2024    09:54 8/3/2024    08:34 1/18/2025    08:38   HGBA1C Last 3 Results   Hemoglobin A1C 7.0  7.4  7.8      Lab Results   Component Value Date     HGBA1C 7.8 (H) 01/18/2025     Lab Results   Component Value Date    GLUCOSE 161 (H) 01/18/2025    CALCIUM 9.3 01/18/2025     01/18/2025    K 4.1 01/18/2025    CO2 23 01/18/2025    CL 97 01/18/2025    BUN 23 01/18/2025    CREATININE 1.54 (H) 01/18/2025    EGFRIFAFRI 32 (L) 12/09/2022    BCR 15 01/18/2025    ANIONGAP 13.2 04/10/2023     Lab Results   Component Value Date    CHOL 166 07/11/2023    CHLPL 119 07/03/2024    TRIG 196 (H) 07/03/2024    HDL 25 (L) 07/03/2024    LDL 61 07/03/2024     Microalbumin          1/18/2025    08:38   Microalbumin   Microalbumin, Urine 42.2      Current Medication List  This medication list has been reviewed with the patient and evaluated for any interactions or necessary modifications/recommendations, and updated to include all prescription medications, OTC medications, and supplements the patient is currently taking.  This list reflects what is contained in the patient's profile, which has also been marked as reviewed to communicate to other providers it is the most up to date version of the patient's current medication therapy.     Current Outpatient Medications:     albuterol sulfate  (90 Base) MCG/ACT inhaler, Inhale 2 puffs every 6 hours as needed for wheezing and shortness of breath, Disp: 18 g, Rfl: 1    apixaban (ELIQUIS) 5 MG tablet tablet, Take 1 tablet by mouth Every 12 (Twelve) Hours., Disp: 60 tablet, Rfl: 3    Blood Glucose Monitoring Suppl (Accu-Chek Guide) w/Device kit, 1 each 4 (Four) Times a Day After Meals & at Bedtime., Disp: 1 kit, Rfl: 0    cetirizine (Allergy Relief Cetirizine) 10 MG tablet, Take 1 tablet by mouth Daily., Disp: 30 tablet, Rfl: 3    Continuous Glucose Sensor (Dexcom G7 Sensor) misc, Use 1 each Every 10 (Ten) Days., Disp: 9 each, Rfl: 3    Dulaglutide 1.5 MG/0.5ML solution auto-injector, Inject 1.5 mg under the skin into the appropriate area as directed Every 7 (Seven) Days., Disp: 2 mL, Rfl: 3    ferrous sulfate 324 (65 Fe) MG  "tablet delayed-release EC tablet, Take 1 tablet by mouth Daily With Breakfast., Disp: , Rfl:     furosemide (LASIX) 40 MG tablet, take 1 tablet by oral route  every day, Disp: 90 tablet, Rfl: 3    gabapentin (NEURONTIN) 300 MG capsule, Take 1 capsule by mouth 3 (Three) Times a Day., Disp: 90 capsule, Rfl: 3    glucose blood test strip, Use as instructed to test blood sugar 4 times daily, Disp: 400 each, Rfl: 1    Insulin Glargine (Lantus SoloStar) 100 UNIT/ML injection pen, Inject 40 Units under the skin into the appropriate area as directed Every Night., Disp: 30 mL, Rfl: 3    Insulin Lispro, 1 Unit Dial, (HumaLOG KwikPen) 100 UNIT/ML solution pen-injector, Inject 21 Units under the skin into the appropriate area as directed 3 (Three) Times a Day With Meals., Disp: 15 mL, Rfl: 5    Insulin Pen Needle 32G X 4 MM misc, Use 1 each 4 (Four) Times a Day Before Meals & at Bedtime., Disp: 400 each, Rfl: 1    Jardiance 25 MG tablet tablet, Take 1 tablet by mouth Every Morning., Disp: 90 tablet, Rfl: 1    Lancet Devices (TRUEdraw Lancing Device) misc, Use 1 each 4 (Four) Times a Day., Disp: 1 each, Rfl: 0    levothyroxine (Synthroid) 125 MCG tablet, Take 1 tablet by mouth Daily for 180 days., Disp: 90 tablet, Rfl: 1    metoprolol tartrate (LOPRESSOR) 25 MG tablet, Take 1 tablet by mouth 2 (Two) Times a Day., Disp: 60 tablet, Rfl: 3    midodrine (PROAMATINE) 5 MG tablet, Take 2 tablets by mouth 3 (Three) Times a Day. (Patient taking differently: Take 1 tablet by mouth 3 (Three) Times a Day. 1 TABLET 3 TIMES DAILY), Disp: 90 tablet, Rfl: 11    midodrine (PROAMATINE) 5 MG tablet, Take 1 tablet by mouth 3 (three) times daily., Disp: 90 tablet, Rfl: 5    Myrbetriq 25 MG tablet sustained-release 24 hour 24 hr tablet, Take 1 tablet by mouth Daily., Disp: 90 tablet, Rfl: 1    pravastatin (Pravachol) 20 MG tablet, Take 1 tablet by mouth Every Evening., Disp: 90 tablet, Rfl: 3    RELION INSULIN SYR 0.5ML/31G 31G X 5/16\" 0.5 ML misc, " , Disp: , Rfl:     sodium bicarbonate 650 MG tablet, Take 1 tablet by mouth 3 (Three) Times a Day., Disp: 90 tablet, Rfl: 1    TRUEplus Lancets 30G misc, Use 1 each 4 (Four) Times a Day. Before meals and at bedtime., Disp: 400 each, Rfl: 1    Medicines reviewed by Juaquin Yu, PharmD on 2/18/2025 at  3:41 PM  Medicines reviewed by Juaquin Yu, PharmD on 2/18/2025 at  3:41 PM    Drug Interactions  No Clinically Significant DDIs Were Identified at Present Time Upon Marking Medications Reviewed    Recommended Medications Assessment  Aspirin: Not Taking Currently - of note, pt takes Eliquis  Statin: Currently Taking   ACEi/ARB: Not Taking Currently    Adverse Drug Reactions  Medication tolerability: Tolerating with no to minimal ADRs  Medication plan: Continue therapy with normal follow-up  Plan for ADR Management: N/A at this time.  Pt reports they are tolerating therapy well.    Adherence, Self-Administration, and Current Therapy Problems  Adherence related to the patient's specialty therapy was discussed with the patient. The Adherence segment of this outreach has been reviewed and updated.     Adherence Questions  Linked Medication(s) Assessed: Dulaglutide, Empagliflozin (Jardiance), Insulin Glargine (Lantus SoloStar)  On average, how many doses/injections does the patient miss per month?: 0  What are the identified reasons for non-adherence or missed doses? : no problems identified  What is the estimated medication adherence level?: %  Based on the patient/caregiver response and refill history, does this patient require an MTP to track adherence improvements?: no    Additional Barriers to Patient Self-Administration: No known barriers at this time  Methods for Supporting Patient Self-Administration: Continued  enrollment    Open Medication Therapy Problems  No medication therapy recommendations to display    Goals of Therapy  Goals related to the patient's specialty therapy were discussed with the  patient. The Patient Goals segment of this outreach has been reviewed and updated.   Goals Addressed Today        Specialty Pharmacy General Goal      Decrease A1c to <7%      Lab Results    Component Value Date     Phone Reassessment  02/18/2025 Phone Reassessment - A1c continues to be on track with goal but still slightly above goal range of <7%.  Will continue to follow and monitor.  CR    HGBA1C 7.8 (H) 01/18/2025     HGBA1C 7.4 (H) 08/03/2024     HGBA1C 7.0 (H) 07/03/2024     HGBA1C 7.4 (H) 05/09/2024     HGBA1C 7.9 (H) 03/27/2024                           Quality of Life Assessment   Quality of Life related to the patient's enrollment in the patient management program and services provided was discussed with the patient. The QOL segment of this outreach has been reviewed and updated.  Quality of Life Improvement Scale: 9-A good deal better    Reassessment Plan & Follow-Up  1. Medication Therapy Changes: Phone Reassessment - A1c continues to be on track with goal but still slightly above goal range of <7%.  Will continue to follow and monitor.   2. Related Plans, Therapy Recommendations, or Issues to Be Addressed: None  3. Pharmacist to perform regular assessments no more than (6) months from the previous assessment.  4. Care Coordinator to set up future refill outreaches, coordinate prescription delivery, and escalate clinical questions to pharmacist.    Attestation  Therapeutic appropriateness: Appropriate   I attest the patient was actively involved in and has agreed to the above plan of care.  If the prescribed therapy is at any point deemed not appropriate based on the current or future assessments, a consultation will be initiated with the patient's specialty care provider to determine the best course of action. The revised plan of therapy will be documented along with any required assessments and/or additional patient education provided.       Juaquin Yu, PharmD, MPH  Clinical Specialty Pharmacist,  Endocrinology  2/18/2025  15:45 EST    Discussed the aforementioned information with the patient via Telephone.

## 2025-02-25 ENCOUNTER — TELEPHONE (OUTPATIENT)
Dept: ENDOCRINOLOGY | Facility: CLINIC | Age: 60
End: 2025-02-25
Payer: MEDICAID

## 2025-02-25 ENCOUNTER — SPECIALTY PHARMACY (OUTPATIENT)
Dept: ENDOCRINOLOGY | Facility: CLINIC | Age: 60
End: 2025-02-25
Payer: MEDICAID

## 2025-02-25 RX ORDER — CETIRIZINE HYDROCHLORIDE 10 MG/1
10 TABLET ORAL DAILY
Qty: 30 TABLET | Refills: 3 | Status: SHIPPED | OUTPATIENT
Start: 2025-02-25

## 2025-02-25 RX ORDER — SODIUM BICARBONATE 650 MG/1
650 TABLET ORAL 3 TIMES DAILY
Qty: 90 TABLET | Refills: 1 | Status: SHIPPED | OUTPATIENT
Start: 2025-02-25

## 2025-02-25 RX ORDER — PRAVASTATIN SODIUM 20 MG
20 TABLET ORAL EVERY EVENING
Qty: 90 TABLET | Refills: 1 | Status: SHIPPED | OUTPATIENT
Start: 2025-02-25 | End: 2026-02-25

## 2025-02-25 RX ORDER — LEVOTHYROXINE SODIUM 125 UG/1
125 TABLET ORAL DAILY
Qty: 90 TABLET | Refills: 1 | Status: SHIPPED | OUTPATIENT
Start: 2025-02-25 | End: 2025-08-24

## 2025-02-25 NOTE — TELEPHONE ENCOUNTER
Specialty Pharmacy Patient Management Program       Marilyn Martins is a 59 y.o. female seen by an Endocrinology provider for Type 2 Diabetes and enrolled in the Endocrinology Patient Management program offered by [facility].      Requested Prescriptions     Pending Prescriptions Disp Refills    levothyroxine (Synthroid) 125 MCG tablet 90 tablet 1     Sig: Take 1 tablet by mouth Daily for 180 days.    pravastatin (Pravachol) 20 MG tablet 90 tablet 1     Sig: Take 1 tablet by mouth Every Evening.       Refill sent in to patient's pharmacy for above medication prescribed per telephone order by Dr. Narvaez.   Last office visit 1/22/2025.  Next visit scheduled 5/1/2025.      Juaquin Yu, PharmD, MPH  Clinical Specialty Pharmacist, Endocrinology  2/25/2025  15:00 EST

## 2025-02-25 NOTE — PROGRESS NOTES
" Specialty Pharmacy Patient Management Program  Endocrinology Refill Outreach      Marilyn \"Delphine\" is a 59 y.o. female contacted today regarding refills of her medication(s).    Specialty medication(s) and dose(s) confirmed: Trulicity    Refill Questions      Flowsheet Row Most Recent Value   Changes to allergies? No   Changes to medications? No   New conditions or infections since last clinic visit No   Unplanned office visit, urgent care, ED, or hospital admission in the last 4 weeks  No   How does patient/caregiver feel medication is working? Good   Financial problems or insurance changes  No   Since the previous refill, were any specialty medication doses or scheduled injections missed or delayed?  No   Does this patient require a clinical escalation to a pharmacist? No          Delivery Questions      Flowsheet Row Most Recent Value   Delivery method  at Pharmacy   Number of medications in delivery 11   Medication(s) being filled and delivered Gabapentin (NEURONTIN), Empagliflozin (Jardiance), Midodrine HCl (PROAMATINE), Dulaglutide, Cetirizine HCl (zyrTEC), Sodium Bicarbonate, Levothyroxine Sodium (SYNTHROID, LEVOTHROID), Continuous Glucose Sensor (Dexcom G7 Sensor), Apixaban (ELIQUIS), Metoprolol Tartrate (LOPRESSOR)   Copay verified? Yes   Copay amount $0.00   Copay form of payment No copayment ($0)   Delivery Date Selection 02/25/25   Signature Required No            Follow-Up: 1 month    Myriam Gamboa CPHT  Clinical Specialty Pharmacy Technician  2/25/2025  13:55 EST     "

## 2025-02-27 ENCOUNTER — OFFICE VISIT (OUTPATIENT)
Dept: FAMILY MEDICINE CLINIC | Facility: CLINIC | Age: 60
End: 2025-02-27
Payer: MEDICAID

## 2025-02-27 VITALS
HEIGHT: 61 IN | SYSTOLIC BLOOD PRESSURE: 106 MMHG | BODY MASS INDEX: 53.71 KG/M2 | HEART RATE: 76 BPM | TEMPERATURE: 98.4 F | OXYGEN SATURATION: 96 % | DIASTOLIC BLOOD PRESSURE: 69 MMHG | WEIGHT: 284.5 LBS

## 2025-02-27 DIAGNOSIS — I10 PRIMARY HYPERTENSION: Primary | ICD-10-CM

## 2025-02-27 DIAGNOSIS — H91.93 DECREASED HEARING OF BOTH EARS: ICD-10-CM

## 2025-02-27 DIAGNOSIS — E78.5 HYPERLIPIDEMIA, UNSPECIFIED HYPERLIPIDEMIA TYPE: ICD-10-CM

## 2025-02-27 DIAGNOSIS — H61.23 BILATERAL IMPACTED CERUMEN: ICD-10-CM

## 2025-02-27 DIAGNOSIS — L03.115 CELLULITIS OF RIGHT LOWER EXTREMITY: ICD-10-CM

## 2025-02-27 DIAGNOSIS — M65.931 TENOSYNOVITIS OF RIGHT WRIST: ICD-10-CM

## 2025-02-27 DIAGNOSIS — I89.0 LYMPHEDEMA: ICD-10-CM

## 2025-02-27 RX ORDER — MUPIROCIN 20 MG/G
1 OINTMENT TOPICAL 2 TIMES DAILY
Qty: 22 G | Refills: 1 | Status: SHIPPED | OUTPATIENT
Start: 2025-02-27

## 2025-02-27 NOTE — PROGRESS NOTES
Procedure   Ear Cerumen Removal    Date/Time: 2/27/2025 2:57 PM    Performed by: Sondra Thomas APRN  Authorized by: Sondra Thomas APRN    Anesthesia:  Local Anesthetic: none  Location details: left ear and right ear  Patient tolerance: patient tolerated the procedure well with no immediate complications  Procedure type: instrumentation, irrigation   Sedation:  Patient sedated: no

## 2025-02-27 NOTE — PROGRESS NOTES
Subjective   Marilyn Martins is a 59 y.o. female presents for   Chief Complaint   Patient presents with    Diabetes    Hypothyroidism    Hyperlipidemia    Hand Pain     Right side    Leg Swelling     With a rash on her right leg    Loop Recorder     Had it put in on 2/6/25 for [facility]       Health Maintenance Due   Topic Date Due    ANNUAL PHYSICAL  Never done    COLORECTAL CANCER SCREENING  06/28/2024       History of Present Illness  The patient is a 59-year-old female who presents for her 6-month follow-up for hypertension, hyperlipidemia, hypothyroidism, and concerns regarding hand pain and leg swelling. She is accompanied by her sister.    She has been experiencing hand pain for the past 2 to 3 months, which she attributes to a previous diagnosis of tendinitis in her wrist. The pain is particularly noticeable when she attempts to grasp or lift objects. She recalls an incident where she may have strained her wrist, leading to the current symptoms. The pain has been progressively worsening. She was previously diagnosed with carpal tunnel syndrome, extending from her wrist to her elbow, following an incident where she was lifting a heavy object. Initially, she was treated with a cast and has since been using braces intermittently. However, she does not currently have a brace. She reports no recent falls and has not sought any treatment for her symptoms. She does not have Voltaren at home.    She has a persistent rash on her right leg, which remains red and swollen due to lymphedema. She has been managing the condition by elevating her leg. A few months ago, she developed sores on her leg, which have since healed. She acknowledges that her skin is dry and plans to try a new lotion. Her sister has observed that her leg occasionally becomes very warm. She has been applying regular lotion to her leg. She has not been using wraps or lymphedema pumps due to insurance coverage issues. 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 She also previously went to physical therapy, and her therapist would wrap her legs, but she would remove the wraps by nightfall. She has been advised to keep her leg elevated. She has been adhering to a low-sodium diet and does not consume salt in her meals. She also avoids eating out frequently. She has been experiencing difficulty hearing and is considering a screening test with an ENT specialist. She has been attending ENT appointments every 6 months.    She is still taking all her medications including Synthroid and pravastatin. She does not need refills on those because the 2 that she needed were just refilled. She is also seeing endocrinology. She sees Dr. Narvaez and Dr. Barton. The only one that was switched was the urologist, it is not Dr. Mckeon now, Dr. Tidwell took over. She is seeing Dr. Tidwell now. She was scheduled to see him on 03/17/2025, but for some reason, they canceled it. She got rescheduled for him. She has bronchitis and asthma.    MEDICATIONS  Synthroid, pravastatin    Vitals:    02/27/25 1358   BP: 106/69   BP Location: Left arm   Patient Position: Sitting   Cuff Size: Large Adult   Pulse: 76   Temp: 98.4 °F (36.9 °C)   TempSrc: Tympanic   SpO2: 96%   Weight: 129 kg (284 lb 8 oz)   Height: 154.9 cm (60.98\")     Body mass index is 53.78 kg/m².    Current Outpatient Medications on File Prior to Visit   Medication Sig Dispense Refill    albuterol sulfate  (90 Base) MCG/ACT inhaler Inhale 2 puffs every 6 hours as needed for wheezing and shortness of breath 18 g 1    apixaban (ELIQUIS) 5 MG tablet tablet Take 1 tablet by mouth Every 12 (Twelve) Hours. 60 tablet 3    Blood Glucose Monitoring Suppl (Accu-Chek Guide) w/Device kit 1 each 4 (Four) Times a Day After Meals & at Bedtime. 1 kit 0    cetirizine (Allergy Relief Cetirizine) 10 MG tablet Take 1 tablet by mouth Daily. 30 tablet 3    Continuous " Glucose Sensor (Dexcom G7 Sensor) misc Use 1 each Every 10 (Ten) Days. 9 each 3    Dulaglutide 1.5 MG/0.5ML solution auto-injector Inject 1.5 mg under the skin into the appropriate area as directed Every 7 (Seven) Days. 2 mL 3    ferrous sulfate 324 (65 Fe) MG tablet delayed-release EC tablet Take 1 tablet by mouth Daily With Breakfast.      furosemide (LASIX) 40 MG tablet take 1 tablet by oral route  every day 90 tablet 3    gabapentin (NEURONTIN) 300 MG capsule Take 1 capsule by mouth 3 (Three) Times a Day. 90 capsule 3    glucose blood test strip Use as instructed to test blood sugar 4 times daily 400 each 1    Insulin Glargine (Lantus SoloStar) 100 UNIT/ML injection pen Inject 40 Units under the skin into the appropriate area as directed Every Night. (Patient taking differently: Inject 10 Units under the skin into the appropriate area as directed Every Night.) 30 mL 3    Insulin Lispro, 1 Unit Dial, (HumaLOG KwikPen) 100 UNIT/ML solution pen-injector Inject 21 Units under the skin into the appropriate area as directed 3 (Three) Times a Day With Meals. (Patient taking differently: Inject 10 Units under the skin into the appropriate area as directed 3 (Three) Times a Day With Meals.) 15 mL 5    Insulin Pen Needle 32G X 4 MM misc Use 1 each 4 (Four) Times a Day Before Meals & at Bedtime. 400 each 1    Jardiance 25 MG tablet tablet Take 1 tablet by mouth Every Morning. 90 tablet 1    Lancet Devices (TRUEdraw Lancing Device) misc Use 1 each 4 (Four) Times a Day. 1 each 0    levothyroxine (Synthroid) 125 MCG tablet Take 1 tablet by mouth Daily for 180 days. 90 tablet 1    metoprolol tartrate (LOPRESSOR) 25 MG tablet Take 1 tablet by mouth 2 (Two) Times a Day. 60 tablet 3    midodrine (PROAMATINE) 5 MG tablet Take 1 tablet by mouth 3 (three) times daily. 90 tablet 5    Myrbetriq 25 MG tablet sustained-release 24 hour 24 hr tablet Take 1 tablet by mouth Daily. 90 tablet 1    pravastatin (Pravachol) 20 MG tablet Take 1  "tablet by mouth Every Evening. 90 tablet 1    RELION INSULIN SYR 0.5ML/31G 31G X 5/16\" 0.5 ML misc       sodium bicarbonate 650 MG tablet Take 1 tablet by mouth 3 (Three) Times a Day. 90 tablet 1    TRUEplus Lancets 30G misc Use 1 each 4 (Four) Times a Day. Before meals and at bedtime. 400 each 1    [DISCONTINUED] midodrine (PROAMATINE) 5 MG tablet Take 2 tablets by mouth 3 (Three) Times a Day. (Patient not taking: Reported on 2/27/2025) 90 tablet 11    [DISCONTINUED] SITagliptin (Januvia) 50 MG tablet Take 1 tablet by mouth Daily. 90 tablet 3     No current facility-administered medications on file prior to visit.       The following portions of the patient's history were reviewed and updated as appropriate: allergies, current medications, past family history, past medical history, past social history, past surgical history, and problem list.    Review of Systems   Constitutional:  Negative for unexpected weight loss.   Eyes:  Negative for blurred vision.   Endocrine: Positive for polydipsia and polyuria.   Musculoskeletal:         Right thumb pain   Neurological:  Negative for tremors, speech difficulty and confusion.       Objective   Physical Exam  Vitals and nursing note reviewed.   Constitutional:       Appearance: Normal appearance. She is well-developed. She is obese.   HENT:      Head: Normocephalic and atraumatic.      Right Ear: External ear normal. There is impacted cerumen.      Left Ear: External ear normal. There is impacted cerumen.      Nose: Nose normal.   Eyes:      Extraocular Movements: Extraocular movements intact.      Pupils: Pupils are equal, round, and reactive to light.   Cardiovascular:      Rate and Rhythm: Normal rate and regular rhythm.      Heart sounds: Normal heart sounds.   Pulmonary:      Effort: Pulmonary effort is normal.      Breath sounds: Normal breath sounds.   Abdominal:      General: Bowel sounds are normal.      Palpations: Abdomen is soft.   Genitourinary:     Vagina: " Normal.   Musculoskeletal:         General: Normal range of motion.      Right wrist: Tenderness present. Decreased range of motion.      Cervical back: Normal range of motion and neck supple.      Comments: Right wrist positive Finklestein test   Skin:     General: Skin is warm and dry.          Neurological:      General: No focal deficit present.      Mental Status: She is alert and oriented to person, place, and time.   Psychiatric:         Mood and Affect: Mood normal.         Behavior: Behavior normal.         Judgment: Judgment normal.          Both ears have a significant amount of wax.  No wheezing.    PHQ-9 Total Score:      Results      Assessment & Plan   Diagnoses and all orders for this visit:    1. Primary hypertension (Primary)    2. Hyperlipidemia, unspecified hyperlipidemia type    3. Cellulitis of right lower extremity  -     mupirocin (BACTROBAN) 2 % ointment; Apply 1 Application topically to the appropriate area as directed 2 (Two) Times a Day.  Dispense: 22 g; Refill: 1    4. Lymphedema  -     Ambulatory Referral to Physical Therapy for Evaluation & Treatment    5. Decreased hearing of both ears  -     Cancel: Ambulatory Referral to ENT (Otolaryngology)  -     Ambulatory Referral to ENT (Otolaryngology)  -     Ear Cerumen Removal    6. Bilateral impacted cerumen  -     Cancel: Ambulatory Referral to ENT (Otolaryngology)  -     Ambulatory Referral to ENT (Otolaryngology)  -     Ear Cerumen Removal    7. Tenosynovitis of right wrist         1. Tendinitis.  The patient reports hand pain for the past 2 to 3 months, exacerbated by activities such as holding or picking up objects. Examination reveals tenderness consistent with tendinitis. She is advised to apply ice intermittently and use Voltaren gel for inflammation. A wrist brace is recommended for additional support. If symptoms persist, a referral to a hand specialist for injections will be considered. Information on tendinitis management will  be provided.    2. Lymphedema.  The patient has chronic lymphedema in her right leg, which remains red and swollen. She has tried compression stockings and wrapping in the past but experienced issues. She is advised to use topical mupirocin to prevent bacterial infection and moisturize the skin. Emollients like Aquaphor or CeraVe healing ointment are recommended for better skin penetration and moisture retention. An Ace wrap will be applied today, which should be removed at night and reapplied during the day, especially when sitting with the leg hanging down. A low-sodium diet and intermittent leg elevation are encouraged. A referral to a physical therapist for lymphedema management will be made. If the leg condition worsens, she should return for further evaluation.    3. Cerumen impaction.  The patient has significant wax buildup in both ears, which may be contributing to her hearing difficulties. Her ears will be flushed today. A referral to an ENT specialist for a hearing test will be made.    4. Hypertension.  Her blood pressure is well-controlled today. She should continue her current medication regimen and maintain a low-sodium diet.    5. Hyperlipidemia.  She is currently taking pravastatin (Pravachol) and should continue this medication. No refills are needed at this time.    6. Hypothyroidism.  She is currently taking Synthroid and should continue this medication. No refills are needed at this time.    There are no Patient Instructions on file for this visit.         Patient or patient representative verbalized consent for the use of Ambient Listening during the visit with  MARRY Simpson for chart documentation. 2/27/2025  15:03 EST

## 2025-04-04 ENCOUNTER — TELEPHONE (OUTPATIENT)
Dept: ENDOCRINOLOGY | Facility: CLINIC | Age: 60
End: 2025-04-04
Payer: MEDICAID

## 2025-04-04 RX ORDER — ACYCLOVIR 400 MG/1
1 TABLET ORAL
Qty: 9 EACH | Refills: 1 | Status: SHIPPED | OUTPATIENT
Start: 2025-04-04

## 2025-04-04 NOTE — TELEPHONE ENCOUNTER
Specialty Pharmacy Patient Management Program       Marilyn Martins is a 59 y.o. female seen by an Endocrinology provider for Type 2 Diabetes and enrolled in the Endocrinology Patient Management program offered by [facility].      Requested Prescriptions     Pending Prescriptions Disp Refills    Continuous Glucose Sensor (Dexcom G7 Sensor) misc 9 each 1     Sig: Use 1 each Every 10 (Ten) Days.       Refill sent in to patient's pharmacy for above medication prescribed per telephone order by Dr. Narvaez.   Last office visit 1/22/2025.  Next visit scheduled 5/1/2025.      Juaquin Yu, PharmD, MPH  Clinical Specialty Pharmacist, Endocrinology  4/4/2025  10:08 EDT

## 2025-04-09 ENCOUNTER — SPECIALTY PHARMACY (OUTPATIENT)
Dept: ENDOCRINOLOGY | Facility: CLINIC | Age: 60
End: 2025-04-09
Payer: MEDICAID

## 2025-04-09 NOTE — PROGRESS NOTES
" Specialty Pharmacy Patient Management Program  Endocrinology Refill Outreach      Marilyn \"Delphine\" is a 59 y.o. female contacted today regarding refills of her medication(s).    Specialty medication(s) and dose(s) confirmed: Trulicity    Refill Questions      Flowsheet Row Most Recent Value   Changes to allergies? No   Changes to medications? No   New conditions or infections since last clinic visit No   Unplanned office visit, urgent care, ED, or hospital admission in the last 4 weeks  No   How does patient/caregiver feel medication is working? Good   Financial problems or insurance changes  No   Since the previous refill, were any specialty medication doses or scheduled injections missed or delayed?  No   Does this patient require a clinical escalation to a pharmacist? No          Delivery Questions      Flowsheet Row Most Recent Value   Delivery method  at Pharmacy   Number of medications in delivery 1   Medication(s) being filled and delivered Dulaglutide   Copay verified? Yes   Copay amount $0.00   Copay form of payment No copayment ($0)   Delivery Date Selection 04/09/25   Signature Required No   Do you consent to receive electronic handouts?  No            Follow-Up: 1 month    Myriam Gamboa CPHT  Clinical Specialty Pharmacy Technician  4/9/2025  10:03 EDT     "

## 2025-04-14 ENCOUNTER — HOSPITAL ENCOUNTER (OUTPATIENT)
Dept: GENERAL RADIOLOGY | Facility: HOSPITAL | Age: 60
Discharge: HOME | End: 2025-04-14
Admitting: UROLOGY
Payer: MEDICAID

## 2025-04-14 DIAGNOSIS — N18.9 CHRONIC RENAL INSUFFICIENCY, UNSPECIFIED STAGE: ICD-10-CM

## 2025-04-14 PROCEDURE — 74018 RADEX ABDOMEN 1 VIEW: CPT

## 2025-04-23 ENCOUNTER — SPECIALTY PHARMACY (OUTPATIENT)
Dept: ENDOCRINOLOGY | Facility: CLINIC | Age: 60
End: 2025-04-23
Payer: MEDICAID

## 2025-04-23 RX ORDER — SODIUM BICARBONATE 650 MG/1
650 TABLET ORAL 3 TIMES DAILY
Qty: 90 TABLET | Refills: 6 | Status: SHIPPED | OUTPATIENT
Start: 2025-04-23

## 2025-04-23 RX ORDER — SODIUM BICARBONATE 650 MG/1
650 TABLET ORAL 3 TIMES DAILY
Qty: 90 TABLET | Refills: 1 | Status: CANCELLED | OUTPATIENT
Start: 2025-04-23

## 2025-04-23 NOTE — TELEPHONE ENCOUNTER
Rx Refill Note  Requested Prescriptions     Pending Prescriptions Disp Refills   • sodium bicarbonate 650 MG tablet 90 tablet 1     Sig: Take 1 tablet by mouth 3 (Three) Times a Day.      Last office visit with prescribing clinician: 2/27/2025   Last telemedicine visit with prescribing clinician: Visit date not found   Next office visit with prescribing clinician: 8/29/2025       Would you like a call back once the refill request has been completed: [] Yes [] No    If the office needs to give you a call back, can they leave a voicemail: [] Yes [] No    Queta Rao MA  04/23/25, 11:07 EDT

## 2025-04-23 NOTE — PROGRESS NOTES
" Specialty Pharmacy Patient Management Program  Endocrinology Refill Outreach      Marilyn \"Delphine\" is a 59 y.o. female contacted today regarding refills of her medication(s).    Specialty medication(s) and dose(s) confirmed: Lantus, Humalog    Refill Questions      Flowsheet Row Most Recent Value   Changes to allergies? No   Changes to medications? No   New conditions or infections since last clinic visit No   Unplanned office visit, urgent care, ED, or hospital admission in the last 4 weeks  No   How does patient/caregiver feel medication is working? Good   Financial problems or insurance changes  No   Since the previous refill, were any specialty medication doses or scheduled injections missed or delayed?  No   Does this patient require a clinical escalation to a pharmacist? No          Delivery Questions      Flowsheet Row Most Recent Value   Delivery method  at Pharmacy   Number of medications in delivery 9   Medication(s) being filled and delivered Cetirizine HCl (zyrTEC), Apixaban (ELIQUIS), Insulin Lispro (HUMALOG), Insulin Glargine (Lantus SoloStar), Metoprolol Tartrate (LOPRESSOR), Midodrine HCl (PROAMATINE), Mirabegron (Myrbetriq), Pravastatin Sodium (PRAVACHOL), Sodium Bicarbonate   Copay verified? Yes   Copay amount $0.00   Copay form of payment No copayment ($0)   Delivery Date Selection 04/24/25   Signature Required No   Do you consent to receive electronic handouts?  No            Follow-Up: 3 months    Myriam Gamboa CPHT  Clinical Specialty Pharmacy Technician  4/23/2025  08:56 EDT     "

## 2025-05-01 ENCOUNTER — OFFICE VISIT (OUTPATIENT)
Dept: ENDOCRINOLOGY | Facility: CLINIC | Age: 60
End: 2025-05-01
Payer: MEDICAID

## 2025-05-01 VITALS
BODY MASS INDEX: 66.19 KG/M2 | DIASTOLIC BLOOD PRESSURE: 70 MMHG | HEIGHT: 55 IN | WEIGHT: 286 LBS | SYSTOLIC BLOOD PRESSURE: 120 MMHG | HEART RATE: 108 BPM | OXYGEN SATURATION: 96 %

## 2025-05-01 DIAGNOSIS — E06.3 HYPOTHYROIDISM DUE TO HASHIMOTO'S THYROIDITIS: ICD-10-CM

## 2025-05-01 DIAGNOSIS — Z79.4 TYPE 2 DIABETES MELLITUS WITH HYPERGLYCEMIA, WITH LONG-TERM CURRENT USE OF INSULIN: Primary | ICD-10-CM

## 2025-05-01 DIAGNOSIS — E11.42 DIABETIC PERIPHERAL NEUROPATHY: ICD-10-CM

## 2025-05-01 DIAGNOSIS — E78.49 OTHER HYPERLIPIDEMIA: ICD-10-CM

## 2025-05-01 DIAGNOSIS — E11.65 TYPE 2 DIABETES MELLITUS WITH HYPERGLYCEMIA, WITH LONG-TERM CURRENT USE OF INSULIN: Primary | ICD-10-CM

## 2025-05-01 DIAGNOSIS — N18.32 STAGE 3B CHRONIC KIDNEY DISEASE: ICD-10-CM

## 2025-05-01 DIAGNOSIS — E66.01 MORBID OBESITY: ICD-10-CM

## 2025-05-01 NOTE — PROGRESS NOTES
-----------------------------------------------------------------  ENDOCRINE CLINIC NOTE  -----------------------------------------------------------------        PATIENT NAME: Marilyn Martins  PATIENT : 1965 AGE: 59 y.o.  MRN NUMBER: [id number]  PRIMARY CARE: Sondra Thomas APRN    ==========================================================================    CHIEF COMPLAINT: Type 2 Diabetes Mellitus and Hypothyrodism  DATE OF SERVICE: 25    ==========================================================================    HPI / SUBJECTIVE    59 y.o. female is seen in the clinic today for follow-up of type 2 Diabetes and Hypothyroidism.    Type 2 Diabetes Mellitus:  Diagnosed with type 2 diabetes around   Current therapy:  Insulin Basaglar 15 units nightly  Insulin lispro 15 units with each meal with SSI  Trulicity 1.5 mgs once a week  Jardiance 25 mg p.o. daily  Medications not taking:  Insulin Basaglar stopped the long acting for almost more than a month  Ozempic due to abdominal pain and unable to eat for few days  Januvia discontinued by Tadeo due to heart failure  Underlying history of CKD status post right nephrectomy in 2002  Currently checking blood sugar through G7 Dexcom.  Following [facility], last visit in Oct 2024.  Reports to have mild neuropathy in legs.  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     Hypothyroidism:  Diagnosed with hypothyroidism in .  Current dose is 125 mcg daily.  Recently had blood work which is stable.    ==========================================================================                                                PAST MEDICAL HISTORY    Past Medical History:   Diagnosis Date    A-fib     Atrial fibrillation with rapid ventricular rate    Allergic     Anemia     Anxiety     Arthritis     Asthma     Callus     Cataract     CHF (congestive heart failure)      Cholelithiasis 1987    Gall bladder removed    Chronic constipation     Chronic diarrhea     COPD (chronic obstructive pulmonary disease) 2003    Coronary artery disease 2024    Afib with avr    Depression     Diabetes mellitus     Diabetes mellitus     Difficulty walking     Unable to walk due to left leg amputated    Diverticulosis 2022    GERD (gastroesophageal reflux disease) 2005    Gestational diabetes     1&3 child    Hashimoto's thyroiditis     Headache     Heart murmur 2007    Sometimes heard    History of medical problems April 8,2024    A-fib with rvr    History of transfusion     Last done on Dec 8th 2022 for right kidney removel    HL (hearing loss) 2012    Left ear drum is busted, hole in right ear, get excess of build up in both ears    Hyperlipidemia 2000    Hypothyroidism 1986    Uncontroled    Kidney stone 2022    Left side two removed by lazer surgery,right kidney removed Dec.8th of this year.    Low back pain 2001    Neuropathy in diabetes     Obesity 1989    Osteoporosis     Pneumonia 2022 March of this year    Renal insufficiency 2016    Shortness of breath     Sinusitis     Substance abuse     Type 2 diabetes mellitus     Urinary tract infection     Visual impairment 1985       ==========================================================================    PAST SURGICAL HISTORY    Past Surgical History:   Procedure Laterality Date    ABOVE KNEE AMPUTATION Left 02/2000    ANKLE OPEN REDUCTION INTERNAL FIXATION      Before on left ankle    CHOLECYSTECTOMY  2002    COLONOSCOPY  2004    EYE SURGERY  2014    Carrects    FOOT SURGERY      Had broken left ankle before amputation    HERNIA REPAIR      HYSTERECTOMY      JOINT REPLACEMENT      SUBTOTAL HYSTERECTOMY      TOENAIL EXCISION      Right big toe nail    URETERAL STENT INSERTION         ==========================================================================    FAMILY HISTORY    Family History   Problem Relation Age of Onset    Anxiety  disorder Daughter     Developmental Disability Daughter     Diabetes Daughter         Pre diabetic with first child    Learning disabilities Daughter     Mental illness Daughter         Cutter, anything to harm herself, hanging, burning etc.    Thyroid disease Daughter         Swollen glands, pregnant with second child    Anxiety disorder Daughter     Developmental Disability Daughter     Diabetes Daughter     Learning disabilities Daughter     Mental illness Daughter         Cutters    Developmental Disability Son     Learning disabilities Son     Diabetes Mother        ==========================================================================    SOCIAL HISTORY    Social History     Socioeconomic History    Marital status:     Number of children: 3    Years of education: 12   Tobacco Use    Smoking status: Never    Smokeless tobacco: Never   Vaping Use    Vaping status: Never Used   Substance and Sexual Activity    Alcohol use: Yes     Comment: Wine coolers and sleeping pills together 1996    Drug use: Never    Sexual activity: Not Currently     Partners: Male     Birth control/protection: Hysterectomy       ==========================================================================    MEDICATIONS      Current Outpatient Medications:     albuterol sulfate  (90 Base) MCG/ACT inhaler, Inhale 2 puffs every 6 hours as needed for wheezing and shortness of breath, Disp: 18 g, Rfl: 1    apixaban (ELIQUIS) 5 MG tablet tablet, Take 1 tablet by mouth Every 12 (Twelve) Hours., Disp: 60 tablet, Rfl: 3    Blood Glucose Monitoring Suppl (Accu-Chek Guide) w/Device kit, 1 each 4 (Four) Times a Day After Meals & at Bedtime., Disp: 1 kit, Rfl: 0    cetirizine (Allergy Relief Cetirizine) 10 MG tablet, Take 1 tablet by mouth Daily., Disp: 30 tablet, Rfl: 3    Continuous Glucose Sensor (Dexcom G7 Sensor) misc, Use 1 each Every 10 (Ten) Days., Disp: 9 each, Rfl: 1    ferrous sulfate 324 (65 Fe) MG tablet delayed-release EC  tablet, Take 1 tablet by mouth Daily With Breakfast., Disp: , Rfl:     furosemide (LASIX) 40 MG tablet, take 1 tablet by oral route  every day, Disp: 90 tablet, Rfl: 3    furosemide (LASIX) 40 MG tablet, take 1 tablet by oral route  every 2 days, Disp: 180 tablet, Rfl: 3    gabapentin (NEURONTIN) 300 MG capsule, Take 1 capsule by mouth 3 (Three) Times a Day., Disp: 90 capsule, Rfl: 3    glucose blood test strip, Use as instructed to test blood sugar 4 times daily, Disp: 400 each, Rfl: 1    Insulin Glargine (Lantus SoloStar) 100 UNIT/ML injection pen, Inject 40 Units under the skin into the appropriate area as directed Every Night. (Patient taking differently: Inject 15 Units under the skin into the appropriate area as directed Every Night.), Disp: 30 mL, Rfl: 3    Insulin Lispro, 1 Unit Dial, (HumaLOG KwikPen) 100 UNIT/ML solution pen-injector, Inject 21 Units under the skin into the appropriate area as directed 3 (Three) Times a Day With Meals. (Patient taking differently: Inject 15 Units under the skin into the appropriate area as directed 3 (Three) Times a Day With Meals.), Disp: 15 mL, Rfl: 5    Insulin Pen Needle 32G X 4 MM misc, Use 1 each 4 (Four) Times a Day Before Meals & at Bedtime., Disp: 400 each, Rfl: 1    Jardiance 25 MG tablet tablet, Take 1 tablet by mouth Every Morning., Disp: 90 tablet, Rfl: 1    Lancet Devices (TRUEdraw Lancing Device) misc, Use 1 each 4 (Four) Times a Day., Disp: 1 each, Rfl: 0    levothyroxine (Synthroid) 125 MCG tablet, Take 1 tablet by mouth Daily for 180 days., Disp: 90 tablet, Rfl: 1    metoprolol tartrate (LOPRESSOR) 25 MG tablet, Take 1 tablet by mouth 2 (Two) Times a Day., Disp: 60 tablet, Rfl: 3    midodrine (PROAMATINE) 10 MG tablet, take 1 tablet by oral route 3 times every day, Disp: 270 tablet, Rfl: 11    midodrine (PROAMATINE) 5 MG tablet, Take 1 tablet by mouth 3 (three) times daily., Disp: 90 tablet, Rfl: 5    mupirocin (BACTROBAN) 2 % ointment, Apply 1  "Application topically to the appropriate area as directed 2 (Two) Times a Day., Disp: 22 g, Rfl: 1    Myrbetriq 25 MG tablet sustained-release 24 hour 24 hr tablet, Take 1 tablet by mouth Daily., Disp: 90 tablet, Rfl: 1    pravastatin (PRAVACHOL) 20 MG tablet, Take 1 tablet by mouth Every Evening., Disp: 90 tablet, Rfl: 1    RELION INSULIN SYR 0.5ML/31G 31G X 5/16\" 0.5 ML misc, , Disp: , Rfl:     sodium bicarbonate 650 MG tablet, Take 1 tablet by mouth 3 (Three) Times a Day., Disp: 90 tablet, Rfl: 6    TRUEplus Lancets 30G misc, Use 1 each 4 (Four) Times a Day. Before meals and at bedtime., Disp: 400 each, Rfl: 1    Dulaglutide 3 MG/0.5ML solution auto-injector, Inject 3 mg under the skin into the appropriate area as directed 1 (One) Time Per Week., Disp: 2 mL, Rfl: 5    ==========================================================================    ALLERGIES    Allergies   Allergen Reactions    Latex Other (See Comments)    Morphine And Codeine Rash    Codeine Other (See Comments)    Sulfa Antibiotics Other (See Comments)    Cephalexin Diarrhea and Nausea And Vomiting     Extreme vomiting, can't keep food or water down either    Povidone Iodine Rash       ==========================================================================    OBJECTIVE    Vitals:    05/01/25 0840   BP: 120/70   Pulse: 108   SpO2: 96%     Body mass index is 348.86 kg/m².     General: Alert, cooperative, no acute distress  Thyroid:  no enlargement/tenderness/palpable nodules  Lungs: Clear to auscultation bilaterally, respirations unlabored  Heart: Regular rate and rhythm, S1 and S2 normal, no murmur, rub or gallop  Abdomen: Soft, NT, ND and Bowel sounds Positive    ==========================================================================    LAB EVALUATION    Lab Results   Component Value Date    GLUCOSE 169 (H) 04/23/2025    BUN 24 04/23/2025    CREATININE 1.36 (H) 04/23/2025    EGFRIFAFRI 32 (L) 12/09/2022    BCR 18 04/23/2025    K 4.3 " 04/23/2025    CO2 25 04/23/2025    CALCIUM 9.5 04/23/2025    ALBUMIN 3.8 (L) 04/23/2025    LABIL2 0.6 (L) 12/02/2022    AST 16 04/23/2025    ALT 14 04/23/2025    CHOL 166 07/11/2023    TRIG 196 (H) 07/03/2024    HDL 25 (L) 07/03/2024    LDL 61 07/03/2024     Lab Results   Component Value Date    HGBA1C 7.6 (H) 04/23/2025    HGBA1C 7.8 (H) 01/18/2025    HGBA1C 7.4 (H) 08/03/2024     Lab Results   Component Value Date    MICROALBUR 42.2 01/18/2025    CREATININE 1.36 (H) 04/23/2025     Lab Results   Component Value Date    TSH 3.360 01/18/2025    FREET4 1.43 01/18/2025     ==========================================================================    CGM Data:    Dates: April 18 - May 1, 2025    Very High > 250: 4%  High 180 - 250: 42%  Target: 70 - 180: 54%  Low 55 - 70:  0%  Very Low < 55: 0%    Average blood sugar 177 with standard deviation of 40 and GMI 7.5%    ==========================================================================    ASSESSMENT AND PLAN    # Type 2 diabetes with hyperglycemia  # Morbid obesity  # Peripheral diabetic neuropathy   # CKD stage III with microalbuminuria, following nephrology as outpatient with Dr. Mccauley    - Reviewed CGM data and patient still have significant fasting hyperglycemia  - Patient is now tolerating basal insulin without any significant hypoglycemia  - Patient is not a good candidate for DPP 4 epidural therapy due to concerns of heart failure  - But patient is tolerating Trulicity 1.5 mg without any issues, will uptitrate the dose to 3 mg while continuing rest of the medical management without any active changes  Insulin Basaglar 15 units nightly  Insulin lispro 15 units with each meal with SSI  Trulicity 3 mgs once a week  Jardiance 25 mg p.o. daily  - Continue to monitor blood sugar through Dexcom G7    # Hypothyroidism with iatrogenic hyperthyroidism  - Continue levothyroxine to 125 mcg p.o. daily  - Repeat thyroid function back again before next visit    #  "Hyperlipidemia  - Continue pravastatin 20 mg, lipid profile before next visit    Return to clinic: 3 months    Entire assessment and plan was discussed and counseled the patient in detail to which patient verbalized understanding and agreed with care.  Answered all queries and concerns.    This note was created using voice recognition software and is inherently subject to errors including those of syntax and \"sound-alike\" substitutions which may escape proofreading.  In such instances, original meaning may be extrapolated by contextual derivation.    Note: Portions of this note may have been copied from previous notes but documentation have been reviewed and edited as necessary to support clinical decision making for today's visit.    ==========================================================================    INFORMATION PROVIDED TO PATIENT    Patient Instructions   Please,     - Continue Jardiance 25 mgs once a day    - Continue Lantus 15 units once every night.    - Use mealtime insulin 15 units with each meal with correction scale    Mealtime insulin needs to be taking 15 minutes before each meal.  Please check your blood sugar before giving mealtime insulin.    - Increase Trulicity to 3 mgs once a week    - Continue to check your blood sugar through Dexcom G7.     If your blood sugar is running less than 100 and fasting then decrease your nighttime insulin by 3 units.    If your blood sugar before lunch, before dinner and before bedtime snack is less than 100 then decrease your mealtime insulin by 2 units.    Use correction scale if needed:    Corrections for High Blood Sugar  Use the following guide to “correct” for pre-meal high blood sugar.  This insulin will help “correct” the high reading.  You will still need to take as per scheduled meal insulin.    If your   Blood Sugar Reading is…. Number of additional   units of Insulin Lispro to Take…  (Correction)    Take 0 additional unit   151-200 Take 1 " additional unit   201-250 Take 2 additional unit   251-300 Take 3 additional unit   301-350 Take 4 additional unit   351-400 Take 5 additional unit   More than 400 Take 6 additional unit       Continue pravastatin therapy to 20 mg 1 pill by mouth daily.     Continue levothyroxine 125 mcg 1 pill by mouth early in the morning at fasting state and maintain fasting for at least 40 minutes.     Repeat blood work fasting before next visit in 3 months.    Thank you for your visit today.     If you have any questions or concerns please feel free to reach out of the office.      ==========================================================================  Ray Narvaez MD  Department of Endocrine, Diabetes and Metabolism  [facility] [place]  ==========================================================================

## 2025-05-01 NOTE — PATIENT INSTRUCTIONS
Please,     - Continue Jardiance 25 mgs once a day    - Continue Lantus 15 units once every night.    - Use mealtime insulin 15 units with each meal with correction scale    Mealtime insulin needs to be taking 15 minutes before each meal.  Please check your blood sugar before giving mealtime insulin.    - Increase Trulicity to 3 mgs once a week    - Continue to check your blood sugar through Dexcom G7.     If your blood sugar is running less than 100 and fasting then decrease your nighttime insulin by 3 units.    If your blood sugar before lunch, before dinner and before bedtime snack is less than 100 then decrease your mealtime insulin by 2 units.    Use correction scale if needed:    Corrections for High Blood Sugar  Use the following guide to “correct” for pre-meal high blood sugar.  This insulin will help “correct” the high reading.  You will still need to take as per scheduled meal insulin.    If your   Blood Sugar Reading is…. Number of additional   units of Insulin Lispro to Take…  (Correction)    Take 0 additional unit   151-200 Take 1 additional unit   201-250 Take 2 additional unit   251-300 Take 3 additional unit   301-350 Take 4 additional unit   351-400 Take 5 additional unit   More than 400 Take 6 additional unit       Continue pravastatin therapy to 20 mg 1 pill by mouth daily.     Continue levothyroxine 125 mcg 1 pill by mouth early in the morning at fasting state and maintain fasting for at least 40 minutes.     Repeat blood work fasting before next visit in 3 months.    Thank you for your visit today.     If you have any questions or concerns please feel free to reach out of the office.

## 2025-05-05 ENCOUNTER — SPECIALTY PHARMACY (OUTPATIENT)
Dept: ENDOCRINOLOGY | Facility: CLINIC | Age: 60
End: 2025-05-05
Payer: MEDICAID

## 2025-05-12 NOTE — PROGRESS NOTES
" Specialty Pharmacy Patient Management Program  Endocrinology Refill Outreach      Marilyn \"Delphine\" is a 59 y.o. female contacted today regarding refills of her medication(s).    Specialty medication(s) and dose(s) confirmed: Trulicity    Refill Questions      Flowsheet Row Most Recent Value   Changes to allergies? No   Changes to medications? No   New conditions or infections since last clinic visit No   Unplanned office visit, urgent care, ED, or hospital admission in the last 4 weeks  No   How does patient/caregiver feel medication is working? Good   Financial problems or insurance changes  No   Since the previous refill, were any specialty medication doses or scheduled injections missed or delayed?  No   Does this patient require a clinical escalation to a pharmacist? No          Delivery Questions      Flowsheet Row Most Recent Value   Delivery method  at Pharmacy   Number of medications in delivery 1   Medication(s) being filled and delivered Dulaglutide   Copay verified? Yes   Copay amount $0.00   Copay form of payment No copayment ($0)   Delivery Date Selection 05/05/25   Signature Required No   Do you consent to receive electronic handouts?  No            Follow up: 1 month    Myriam Gamboa CPHT  Clinical Specialty Pharmacy Technician  05/05/2025  09:00 EDT    "

## 2025-05-23 ENCOUNTER — SPECIALTY PHARMACY (OUTPATIENT)
Dept: ENDOCRINOLOGY | Facility: CLINIC | Age: 60
End: 2025-05-23
Payer: MEDICAID

## 2025-05-23 ENCOUNTER — TELEPHONE (OUTPATIENT)
Dept: ENDOCRINOLOGY | Facility: CLINIC | Age: 60
End: 2025-05-23
Payer: MEDICAID

## 2025-05-23 RX ORDER — GABAPENTIN 300 MG/1
300 CAPSULE ORAL 3 TIMES DAILY
Qty: 90 CAPSULE | Refills: 3 | Status: SHIPPED | OUTPATIENT
Start: 2025-05-23

## 2025-05-23 RX ORDER — EMPAGLIFLOZIN 25 MG/1
25 TABLET, FILM COATED ORAL EVERY MORNING
Qty: 90 TABLET | Refills: 1 | Status: SHIPPED | OUTPATIENT
Start: 2025-05-23

## 2025-05-23 NOTE — TELEPHONE ENCOUNTER
Specialty Pharmacy Patient Management Program       Marilyn Martins is a 59 y.o. female seen by an Endocrinology provider for Type 2 Diabetes and enrolled in the Endocrinology Patient Management program offered by [facility].      Requested Prescriptions     Pending Prescriptions Disp Refills    Jardiance 25 MG tablet tablet 90 tablet 1     Sig: Take 1 tablet by mouth Every Morning.       Refill sent in to patient's pharmacy for above medication prescribed per telephone order by Dr. Narvaez.   Last office visit 5/1/2025.  Next visit scheduled 8/5/2025.      Juaquin Yu, PharmD, MPH  Clinical Specialty Pharmacist, Endocrinology  5/23/2025  12:12 EDT

## 2025-05-23 NOTE — PROGRESS NOTES
" Specialty Pharmacy Patient Management Program  Endocrinology Refill Outreach      Marilyn \"Delphine\" is a 59 y.o. female contacted today regarding refills of her medication(s).    Specialty medication(s) and dose(s) confirmed: Jardiance    Refill Questions      Flowsheet Row Most Recent Value   Changes to allergies? No   Changes to medications? No   New conditions or infections since last clinic visit No   Unplanned office visit, urgent care, ED, or hospital admission in the last 4 weeks  No   How does patient/caregiver feel medication is working? Good   Financial problems or insurance changes  No   Since the previous refill, were any specialty medication doses or scheduled injections missed or delayed?  No   Does this patient require a clinical escalation to a pharmacist? No          Delivery Questions      Flowsheet Row Most Recent Value   Delivery method  at Pharmacy   Number of medications in delivery 2   Medication(s) being filled and delivered Gabapentin (NEURONTIN), Empagliflozin (Jardiance)   Copay verified? Yes   Copay amount $0.00   Copay form of payment No copayment ($0)   Delivery Date Selection 05/23/25   Signature Required No   Do you consent to receive electronic handouts?  No            Follow-Up: 3 months    Myriam Gamboa CPHT  Clinical Specialty Pharmacy Technician  5/23/2025  10:46 EDT   "

## 2025-06-02 RX ORDER — METOPROLOL TARTRATE 25 MG/1
25 TABLET, FILM COATED ORAL 2 TIMES DAILY
Qty: 60 TABLET | Refills: 3 | Status: CANCELLED | OUTPATIENT
Start: 2025-06-02

## 2025-06-02 RX ORDER — METOPROLOL TARTRATE 25 MG/1
25 TABLET, FILM COATED ORAL 2 TIMES DAILY
Qty: 60 TABLET | Refills: 3 | Status: SHIPPED | OUTPATIENT
Start: 2025-06-02

## 2025-06-12 DIAGNOSIS — R35.1 FREQUENT URINATION AT NIGHT: ICD-10-CM

## 2025-06-12 RX ORDER — MIRABEGRON 25 MG/1
25 TABLET, FILM COATED, EXTENDED RELEASE ORAL DAILY
Qty: 90 TABLET | Refills: 1 | Status: SHIPPED | OUTPATIENT
Start: 2025-06-12

## 2025-07-03 ENCOUNTER — SPECIALTY PHARMACY (OUTPATIENT)
Dept: ENDOCRINOLOGY | Facility: CLINIC | Age: 60
End: 2025-07-03
Payer: MEDICAID

## 2025-07-03 NOTE — PROGRESS NOTES
" Specialty Pharmacy Patient Management Program  Endocrinology Refill Outreach      Marilyn \"Delphine\" is a 59 y.o. female contacted today regarding refills of her medication(s).    Specialty medication(s) and dose(s) confirmed: Dexcom Sensors    Refill Questions      Flowsheet Row Most Recent Value   Changes to allergies? No   Changes to medications? No   New conditions or infections since last clinic visit No   Unplanned office visit, urgent care, ED, or hospital admission in the last 4 weeks  No   How does patient/caregiver feel medication is working? Good   Financial problems or insurance changes  No   Since the previous refill, were any specialty medication doses or scheduled injections missed or delayed?  No   Does this patient require a clinical escalation to a pharmacist? Yes          Delivery Questions      Flowsheet Row Most Recent Value   Delivery method  at Pharmacy   Number of medications in delivery 8   Medication(s) being filled and delivered Glucose Blood, Continuous Glucose Sensor (Dexcom G7 Sensor), Apixaban (ELIQUIS), Gabapentin (NEURONTIN), Metoprolol Tartrate (LOPRESSOR), Midodrine HCl (PROAMATINE), Sodium Bicarbonate, Levothyroxine Sodium (SYNTHROID, LEVOTHROID)   Copay verified? Yes   Copay amount $0.00   Copay form of payment No copayment ($0)   Delivery Date Selection 07/03/25   Signature Required No   Do you consent to receive electronic handouts?  No            Follow-Up: 1 month    Myriam Gamboa CPHT  Clinical Specialty Pharmacy Technician  7/3/2025  09:42 EDT   "

## 2025-07-09 ENCOUNTER — SPECIALTY PHARMACY (OUTPATIENT)
Dept: ENDOCRINOLOGY | Facility: CLINIC | Age: 60
End: 2025-07-09
Payer: MEDICAID

## 2025-07-09 RX ORDER — CETIRIZINE HYDROCHLORIDE 10 MG/1
10 TABLET ORAL DAILY
Qty: 30 TABLET | Refills: 3 | Status: SHIPPED | OUTPATIENT
Start: 2025-07-09

## 2025-07-09 NOTE — PROGRESS NOTES
" Specialty Pharmacy Patient Management Program  Endocrinology Refill Outreach      Marilyn \"Delphine\" is a 59 y.o. female contacted today regarding refills of her medication(s).    Specialty medication(s) and dose(s) confirmed: trulicity    Refill Questions      Flowsheet Row Most Recent Value   Changes to allergies? No   Changes to medications? No   New conditions or infections since last clinic visit No   Unplanned office visit, urgent care, ED, or hospital admission in the last 4 weeks  No   How does patient/caregiver feel medication is working? Good   Financial problems or insurance changes  No   Since the previous refill, were any specialty medication doses or scheduled injections missed or delayed?  No   Does this patient require a clinical escalation to a pharmacist? No          Delivery Questions      Flowsheet Row Most Recent Value   Delivery method  at Pharmacy   Number of medications in delivery 2   Medication(s) being filled and delivered Dulaglutide, Cetirizine HCl (zyrTEC)   Copay verified? Yes   Copay amount 0   Copay form of payment No copayment ($0)            Note: Discussed with patient's sister about possible missed doses of trulicity. She reports that the dose of trulicity was changed and that she used up the remaining supply prior to switching to the new dose. No intervention needed at this time. Continue to monitor as normal.     Follow-Up: 28 days    Mattie Chen, PharmD  Clinical Specialty Pharmacist, Endocrinology  7/9/2025  09:42 EDT       "

## 2025-07-18 ENCOUNTER — TELEPHONE (OUTPATIENT)
Dept: ENDOCRINOLOGY | Facility: CLINIC | Age: 60
End: 2025-07-18

## 2025-08-06 ENCOUNTER — TELEPHONE (OUTPATIENT)
Dept: ENDOCRINOLOGY | Facility: CLINIC | Age: 60
End: 2025-08-06
Payer: MEDICAID

## 2025-08-07 ENCOUNTER — SPECIALTY PHARMACY (OUTPATIENT)
Dept: ENDOCRINOLOGY | Facility: CLINIC | Age: 60
End: 2025-08-07
Payer: MEDICAID

## 2025-08-07 DIAGNOSIS — E11.65 TYPE 2 DIABETES MELLITUS WITH HYPERGLYCEMIA, WITH LONG-TERM CURRENT USE OF INSULIN: ICD-10-CM

## 2025-08-07 DIAGNOSIS — Z79.4 TYPE 2 DIABETES MELLITUS WITH HYPERGLYCEMIA, WITH LONG-TERM CURRENT USE OF INSULIN: ICD-10-CM

## 2025-08-07 RX ORDER — INSULIN GLARGINE 100 [IU]/ML
15 INJECTION, SOLUTION SUBCUTANEOUS NIGHTLY
Qty: 15 ML | Refills: 1 | Status: SHIPPED | OUTPATIENT
Start: 2025-08-07

## 2025-08-26 ENCOUNTER — OFFICE VISIT (OUTPATIENT)
Dept: FAMILY MEDICINE CLINIC | Facility: CLINIC | Age: 60
End: 2025-08-26
Payer: OTHER GOVERNMENT

## 2025-08-26 ENCOUNTER — SPECIALTY PHARMACY (OUTPATIENT)
Dept: ENDOCRINOLOGY | Facility: CLINIC | Age: 60
End: 2025-08-26
Payer: OTHER GOVERNMENT

## 2025-08-26 VITALS
HEART RATE: 112 BPM | HEIGHT: 60 IN | OXYGEN SATURATION: 96 % | DIASTOLIC BLOOD PRESSURE: 67 MMHG | TEMPERATURE: 98.7 F | SYSTOLIC BLOOD PRESSURE: 89 MMHG | BODY MASS INDEX: 56.74 KG/M2 | WEIGHT: 289 LBS

## 2025-08-26 DIAGNOSIS — E11.9 TYPE 2 DIABETES MELLITUS WITHOUT COMPLICATION, WITH LONG-TERM CURRENT USE OF INSULIN: ICD-10-CM

## 2025-08-26 DIAGNOSIS — R29.898 WEAKNESS OF EXTREMITY: ICD-10-CM

## 2025-08-26 DIAGNOSIS — Z99.3 WHEELCHAIR DEPENDENT: ICD-10-CM

## 2025-08-26 DIAGNOSIS — R30.0 DYSURIA: ICD-10-CM

## 2025-08-26 DIAGNOSIS — E66.01 MORBID OBESITY: ICD-10-CM

## 2025-08-26 DIAGNOSIS — E11.65 TYPE 2 DIABETES MELLITUS WITH HYPERGLYCEMIA, WITH LONG-TERM CURRENT USE OF INSULIN: ICD-10-CM

## 2025-08-26 DIAGNOSIS — N30.00 ACUTE CYSTITIS WITHOUT HEMATURIA: ICD-10-CM

## 2025-08-26 DIAGNOSIS — R35.0 FREQUENCY OF URINATION: Primary | ICD-10-CM

## 2025-08-26 DIAGNOSIS — I95.9 HYPOTENSION, UNSPECIFIED HYPOTENSION TYPE: ICD-10-CM

## 2025-08-26 DIAGNOSIS — Z79.4 TYPE 2 DIABETES MELLITUS WITHOUT COMPLICATION, WITH LONG-TERM CURRENT USE OF INSULIN: ICD-10-CM

## 2025-08-26 DIAGNOSIS — E03.9 HYPOTHYROIDISM, UNSPECIFIED TYPE: ICD-10-CM

## 2025-08-26 DIAGNOSIS — S91.301A OPEN WOUND OF RIGHT FOOT, INITIAL ENCOUNTER: ICD-10-CM

## 2025-08-26 DIAGNOSIS — E11.42 DIABETIC PERIPHERAL NEUROPATHY: ICD-10-CM

## 2025-08-26 DIAGNOSIS — Z79.4 TYPE 2 DIABETES MELLITUS WITH HYPERGLYCEMIA, WITH LONG-TERM CURRENT USE OF INSULIN: ICD-10-CM

## 2025-08-26 DIAGNOSIS — L97.511 ULCER OF RIGHT FOOT, LIMITED TO BREAKDOWN OF SKIN: ICD-10-CM

## 2025-08-26 LAB
BILIRUB BLD-MCNC: NEGATIVE MG/DL
CLARITY, POC: ABNORMAL
COLOR UR: YELLOW
EXPIRATION DATE: ABNORMAL
GLUCOSE UR STRIP-MCNC: ABNORMAL MG/DL
KETONES UR QL: NEGATIVE
LEUKOCYTE EST, POC: ABNORMAL
Lab: ABNORMAL
NITRITE UR-MCNC: POSITIVE MG/ML
PH UR: 6 [PH] (ref 5–8)
PROT UR STRIP-MCNC: NEGATIVE MG/DL
RBC # UR STRIP: ABNORMAL /UL
SP GR UR: 1.01 (ref 1–1.03)
UROBILINOGEN UR QL: ABNORMAL

## 2025-08-26 PROCEDURE — 1125F AMNT PAIN NOTED PAIN PRSNT: CPT

## 2025-08-26 PROCEDURE — 3051F HG A1C>EQUAL 7.0%<8.0%: CPT

## 2025-08-26 PROCEDURE — 99214 OFFICE O/P EST MOD 30 MIN: CPT

## 2025-08-26 PROCEDURE — 1160F RVW MEDS BY RX/DR IN RCRD: CPT

## 2025-08-26 PROCEDURE — 1159F MED LIST DOCD IN RCRD: CPT

## 2025-08-26 PROCEDURE — 87086 URINE CULTURE/COLONY COUNT: CPT

## 2025-08-26 RX ORDER — NITROFURANTOIN 25; 75 MG/1; MG/1
100 CAPSULE ORAL 2 TIMES DAILY
Qty: 14 CAPSULE | Refills: 0 | Status: SHIPPED | OUTPATIENT
Start: 2025-08-26

## 2025-08-27 ENCOUNTER — TELEPHONE (OUTPATIENT)
Dept: FAMILY MEDICINE CLINIC | Facility: CLINIC | Age: 60
End: 2025-08-27
Payer: OTHER GOVERNMENT

## 2025-08-27 LAB — BACTERIA SPEC AEROBE CULT: NORMAL

## 2025-08-27 RX ORDER — ACYCLOVIR 400 MG/1
1 TABLET ORAL DAILY
Qty: 1 EACH | Refills: 0 | Status: SHIPPED | OUTPATIENT
Start: 2025-08-27